# Patient Record
Sex: FEMALE | Race: WHITE | NOT HISPANIC OR LATINO | Employment: OTHER | URBAN - METROPOLITAN AREA
[De-identification: names, ages, dates, MRNs, and addresses within clinical notes are randomized per-mention and may not be internally consistent; named-entity substitution may affect disease eponyms.]

---

## 2017-02-16 ENCOUNTER — TRANSCRIBE ORDERS (OUTPATIENT)
Dept: ADMINISTRATIVE | Facility: HOSPITAL | Age: 77
End: 2017-02-16

## 2017-02-16 DIAGNOSIS — Z87.891 PERSONAL HISTORY OF TOBACCO USE, PRESENTING HAZARDS TO HEALTH: Primary | ICD-10-CM

## 2018-04-17 ENCOUNTER — TRANSCRIBE ORDERS (OUTPATIENT)
Dept: ADMINISTRATIVE | Facility: HOSPITAL | Age: 78
End: 2018-04-17

## 2018-04-17 DIAGNOSIS — Z72.0 TOBACCO ABUSE: Primary | ICD-10-CM

## 2018-04-17 DIAGNOSIS — Z87.891 PERSONAL HISTORY OF TOBACCO USE, PRESENTING HAZARDS TO HEALTH: ICD-10-CM

## 2018-04-17 DIAGNOSIS — Z72.0 TOBACCO ABUSE DISORDER: ICD-10-CM

## 2019-09-10 ENCOUNTER — TRANSCRIBE ORDERS (OUTPATIENT)
Dept: ADMINISTRATIVE | Facility: HOSPITAL | Age: 79
End: 2019-09-10

## 2019-09-10 DIAGNOSIS — Z12.31 VISIT FOR SCREENING MAMMOGRAM: Primary | ICD-10-CM

## 2019-09-10 DIAGNOSIS — M81.0 AGE-RELATED OSTEOPOROSIS WITHOUT CURRENT PATHOLOGICAL FRACTURE: ICD-10-CM

## 2020-07-13 ENCOUNTER — APPOINTMENT (OUTPATIENT)
Dept: CT IMAGING | Facility: HOSPITAL | Age: 80
End: 2020-07-13

## 2020-07-13 ENCOUNTER — APPOINTMENT (OUTPATIENT)
Dept: GENERAL RADIOLOGY | Facility: HOSPITAL | Age: 80
End: 2020-07-13

## 2020-07-13 ENCOUNTER — HOSPITAL ENCOUNTER (INPATIENT)
Facility: HOSPITAL | Age: 80
LOS: 4 days | Discharge: HOME | End: 2020-07-17
Attending: EMERGENCY MEDICINE | Admitting: INTERNAL MEDICINE
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
## 2020-07-13 DIAGNOSIS — N17.9 AKI (ACUTE KIDNEY INJURY) (HCC): ICD-10-CM

## 2020-07-13 DIAGNOSIS — Z78.9 IMPAIRED MOBILITY AND ADLS: ICD-10-CM

## 2020-07-13 DIAGNOSIS — M62.82 NON-TRAUMATIC RHABDOMYOLYSIS: Primary | ICD-10-CM

## 2020-07-13 DIAGNOSIS — M62.830 BACK SPASM: ICD-10-CM

## 2020-07-13 DIAGNOSIS — Z74.09 IMPAIRED MOBILITY AND ADLS: ICD-10-CM

## 2020-07-13 DIAGNOSIS — J43.9 PULMONARY EMPHYSEMA, UNSPECIFIED EMPHYSEMA TYPE (HCC): ICD-10-CM

## 2020-07-13 DIAGNOSIS — R05.9 COUGH: ICD-10-CM

## 2020-07-13 DIAGNOSIS — M54.9 ACUTE BACK PAIN, UNSPECIFIED BACK LOCATION, UNSPECIFIED BACK PAIN LATERALITY: ICD-10-CM

## 2020-07-13 DIAGNOSIS — S20.229A CONTUSION OF BACK, UNSPECIFIED LATERALITY, INITIAL ENCOUNTER: ICD-10-CM

## 2020-07-13 DIAGNOSIS — S09.90XA INJURY OF HEAD, INITIAL ENCOUNTER: ICD-10-CM

## 2020-07-13 PROBLEM — I10 ESSENTIAL HYPERTENSION: Status: ACTIVE | Noted: 2020-07-13

## 2020-07-13 LAB
ALBUMIN SERPL-MCNC: 3.9 G/DL (ref 3.5–5.2)
ALBUMIN/GLOB SERPL: 1.1 G/DL
ALP SERPL-CCNC: 50 U/L (ref 39–117)
ALT SERPL W P-5'-P-CCNC: 42 U/L (ref 1–33)
ANION GAP SERPL CALCULATED.3IONS-SCNC: 18 MMOL/L (ref 5–15)
AST SERPL-CCNC: 71 U/L (ref 1–32)
BACTERIA UR QL AUTO: ABNORMAL /HPF
BASOPHILS # BLD AUTO: 0.04 10*3/MM3 (ref 0–0.2)
BASOPHILS NFR BLD AUTO: 0.4 % (ref 0–1.5)
BILIRUB SERPL-MCNC: 0.7 MG/DL (ref 0–1.2)
BILIRUB UR QL STRIP: NEGATIVE
BUN SERPL-MCNC: 55 MG/DL (ref 8–23)
BUN/CREAT SERPL: 36.4 (ref 7–25)
CALCIUM SPEC-SCNC: 9.8 MG/DL (ref 8.6–10.5)
CHLORIDE SERPL-SCNC: 88 MMOL/L (ref 98–107)
CK SERPL-CCNC: 1320 U/L (ref 20–180)
CLARITY UR: CLEAR
CO2 SERPL-SCNC: 23 MMOL/L (ref 22–29)
COLOR UR: YELLOW
CREAT SERPL-MCNC: 1.51 MG/DL (ref 0.57–1)
DEPRECATED RDW RBC AUTO: 46.8 FL (ref 37–54)
EOSINOPHIL # BLD AUTO: 0.03 10*3/MM3 (ref 0–0.4)
EOSINOPHIL NFR BLD AUTO: 0.3 % (ref 0.3–6.2)
ERYTHROCYTE [DISTWIDTH] IN BLOOD BY AUTOMATED COUNT: 13.7 % (ref 12.3–15.4)
GFR SERPL CREATININE-BSD FRML MDRD: 33 ML/MIN/1.73
GLOBULIN UR ELPH-MCNC: 3.5 GM/DL
GLUCOSE SERPL-MCNC: 126 MG/DL (ref 65–99)
GLUCOSE UR STRIP-MCNC: NEGATIVE MG/DL
HCT VFR BLD AUTO: 46.8 % (ref 34–46.6)
HGB BLD-MCNC: 16.1 G/DL (ref 12–15.9)
HGB UR QL STRIP.AUTO: ABNORMAL
HOLD SPECIMEN: NORMAL
HOLD SPECIMEN: NORMAL
HYALINE CASTS UR QL AUTO: ABNORMAL /LPF
IMM GRANULOCYTES # BLD AUTO: 0.05 10*3/MM3 (ref 0–0.05)
IMM GRANULOCYTES NFR BLD AUTO: 0.5 % (ref 0–0.5)
KETONES UR QL STRIP: ABNORMAL
LEUKOCYTE ESTERASE UR QL STRIP.AUTO: NEGATIVE
LYMPHOCYTES # BLD AUTO: 1.4 10*3/MM3 (ref 0.7–3.1)
LYMPHOCYTES NFR BLD AUTO: 13.4 % (ref 19.6–45.3)
MAGNESIUM SERPL-MCNC: 2.2 MG/DL (ref 1.6–2.4)
MCH RBC QN AUTO: 32.3 PG (ref 26.6–33)
MCHC RBC AUTO-ENTMCNC: 34.4 G/DL (ref 31.5–35.7)
MCV RBC AUTO: 93.8 FL (ref 79–97)
MONOCYTES # BLD AUTO: 1.06 10*3/MM3 (ref 0.1–0.9)
MONOCYTES NFR BLD AUTO: 10.2 % (ref 5–12)
NEUTROPHILS NFR BLD AUTO: 7.83 10*3/MM3 (ref 1.7–7)
NEUTROPHILS NFR BLD AUTO: 75.2 % (ref 42.7–76)
NITRITE UR QL STRIP: NEGATIVE
NRBC BLD AUTO-RTO: 0 /100 WBC (ref 0–0.2)
NT-PROBNP SERPL-MCNC: 1009 PG/ML (ref 0–1800)
PH UR STRIP.AUTO: <=5 [PH] (ref 5–8)
PLATELET # BLD AUTO: 356 10*3/MM3 (ref 140–450)
PMV BLD AUTO: 9.2 FL (ref 6–12)
POTASSIUM SERPL-SCNC: 3.8 MMOL/L (ref 3.5–5.2)
PROT SERPL-MCNC: 7.4 G/DL (ref 6–8.5)
PROT UR QL STRIP: ABNORMAL
RBC # BLD AUTO: 4.99 10*6/MM3 (ref 3.77–5.28)
RBC # UR: ABNORMAL /HPF
REF LAB TEST METHOD: ABNORMAL
SARS-COV-2 RNA RESP QL NAA+PROBE: NOT DETECTED
SODIUM SERPL-SCNC: 129 MMOL/L (ref 136–145)
SP GR UR STRIP: 1.02 (ref 1–1.03)
SQUAMOUS #/AREA URNS HPF: ABNORMAL /HPF
TROPONIN T SERPL-MCNC: <0.01 NG/ML (ref 0–0.03)
UROBILINOGEN UR QL STRIP: ABNORMAL
WBC # BLD AUTO: 10.41 10*3/MM3 (ref 3.4–10.8)
WBC UR QL AUTO: ABNORMAL /HPF
WHOLE BLOOD HOLD SPECIMEN: NORMAL
WHOLE BLOOD HOLD SPECIMEN: NORMAL

## 2020-07-13 PROCEDURE — 80053 COMPREHEN METABOLIC PANEL: CPT | Performed by: EMERGENCY MEDICINE

## 2020-07-13 PROCEDURE — 83735 ASSAY OF MAGNESIUM: CPT | Performed by: EMERGENCY MEDICINE

## 2020-07-13 PROCEDURE — 93005 ELECTROCARDIOGRAM TRACING: CPT

## 2020-07-13 PROCEDURE — 84484 ASSAY OF TROPONIN QUANT: CPT | Performed by: EMERGENCY MEDICINE

## 2020-07-13 PROCEDURE — 85025 COMPLETE CBC W/AUTO DIFF WBC: CPT | Performed by: EMERGENCY MEDICINE

## 2020-07-13 PROCEDURE — 83880 ASSAY OF NATRIURETIC PEPTIDE: CPT | Performed by: EMERGENCY MEDICINE

## 2020-07-13 PROCEDURE — 73610 X-RAY EXAM OF ANKLE: CPT

## 2020-07-13 PROCEDURE — 74176 CT ABD & PELVIS W/O CONTRAST: CPT

## 2020-07-13 PROCEDURE — 87635 SARS-COV-2 COVID-19 AMP PRB: CPT | Performed by: PHYSICIAN ASSISTANT

## 2020-07-13 PROCEDURE — 81001 URINALYSIS AUTO W/SCOPE: CPT | Performed by: EMERGENCY MEDICINE

## 2020-07-13 PROCEDURE — P9612 CATHETERIZE FOR URINE SPEC: HCPCS

## 2020-07-13 PROCEDURE — 71250 CT THORAX DX C-: CPT

## 2020-07-13 PROCEDURE — 73560 X-RAY EXAM OF KNEE 1 OR 2: CPT

## 2020-07-13 PROCEDURE — 82550 ASSAY OF CK (CPK): CPT | Performed by: NURSE PRACTITIONER

## 2020-07-13 PROCEDURE — 70450 CT HEAD/BRAIN W/O DYE: CPT

## 2020-07-13 PROCEDURE — 93005 ELECTROCARDIOGRAM TRACING: CPT | Performed by: EMERGENCY MEDICINE

## 2020-07-13 PROCEDURE — 71045 X-RAY EXAM CHEST 1 VIEW: CPT

## 2020-07-13 PROCEDURE — 99223 1ST HOSP IP/OBS HIGH 75: CPT | Performed by: HOSPITALIST

## 2020-07-13 PROCEDURE — 99285 EMERGENCY DEPT VISIT HI MDM: CPT

## 2020-07-13 RX ORDER — SODIUM CHLORIDE 0.9 % (FLUSH) 0.9 %
10 SYRINGE (ML) INJECTION AS NEEDED
Status: DISCONTINUED | OUTPATIENT
Start: 2020-07-13 | End: 2020-07-17 | Stop reason: HOSPADM

## 2020-07-13 RX ORDER — SODIUM CHLORIDE 0.9 % (FLUSH) 0.9 %
10 SYRINGE (ML) INJECTION EVERY 12 HOURS SCHEDULED
Status: DISCONTINUED | OUTPATIENT
Start: 2020-07-13 | End: 2020-07-17 | Stop reason: HOSPADM

## 2020-07-13 RX ORDER — SODIUM CHLORIDE, SODIUM LACTATE, POTASSIUM CHLORIDE, CALCIUM CHLORIDE 600; 310; 30; 20 MG/100ML; MG/100ML; MG/100ML; MG/100ML
100 INJECTION, SOLUTION INTRAVENOUS CONTINUOUS
Status: DISCONTINUED | OUTPATIENT
Start: 2020-07-13 | End: 2020-07-17 | Stop reason: HOSPADM

## 2020-07-13 RX ORDER — DOCUSATE SODIUM 100 MG/1
100 CAPSULE, LIQUID FILLED ORAL 2 TIMES DAILY PRN
Status: DISCONTINUED | OUTPATIENT
Start: 2020-07-13 | End: 2020-07-17 | Stop reason: HOSPADM

## 2020-07-13 RX ORDER — ACETAMINOPHEN 325 MG/1
650 TABLET ORAL EVERY 4 HOURS PRN
Status: DISCONTINUED | OUTPATIENT
Start: 2020-07-13 | End: 2020-07-17 | Stop reason: HOSPADM

## 2020-07-13 RX ADMIN — SODIUM CHLORIDE 1000 ML: 9 INJECTION, SOLUTION INTRAVENOUS at 15:14

## 2020-07-13 RX ADMIN — SODIUM CHLORIDE, POTASSIUM CHLORIDE, SODIUM LACTATE AND CALCIUM CHLORIDE 100 ML/HR: 600; 310; 30; 20 INJECTION, SOLUTION INTRAVENOUS at 22:53

## 2020-07-13 RX ADMIN — SODIUM CHLORIDE, PRESERVATIVE FREE 10 ML: 5 INJECTION INTRAVENOUS at 22:53

## 2020-07-13 RX ADMIN — SODIUM CHLORIDE, POTASSIUM CHLORIDE, SODIUM LACTATE AND CALCIUM CHLORIDE 100 ML/HR: 600; 310; 30; 20 INJECTION, SOLUTION INTRAVENOUS at 19:14

## 2020-07-13 RX ADMIN — ACETAMINOPHEN 650 MG: 325 TABLET, FILM COATED ORAL at 23:05

## 2020-07-13 NOTE — H&P
[facility] Medicine Services  HISTORY AND PHYSICAL    Patient Name: Sierra Saba  : 1940  MRN: [id number]  Primary Care Physician: Praveen Page MD  Date of admission: 2020      Subjective   Subjective     Chief Complaint:  Fall, cough    HPI:  Sierra Saba is a 79 y.o. female medical history of hypertension who presents to [facility] with complaints of cough.  The patient reports approximately 1 week ago she fell in her home.  She reported that she could not get up from the ground.  She states she had to crawl to get to her telephone and laid on the ground all night and urinated on herself.  She was able to finally call for help and at that time refused paramedics to bring her to the ER.  She reports she continued to have diffuse body pain and back pain prompting her presentation today.  She is generally feels weak.  She reports that she has not had any focal weakness or changes of speech.  She further denies any dysuria, hematuria, fever or abdominal pain.  She further denies any current shortness of breath.  She recently traveled out of state by private vehicle with her son on  and states she socially distance and wear a mask the entire time.  The patient denies any sick contacts.      On arrival to the ER the patient's heart rate was elevated at 120.  Her blood pressure was normal 108/57 and a oxygen saturation of 93 percent on room air.  Her initial CMP revealed a elevated creatinine of 1.51 with a sodium level of 129.  She had a mild elevated AST and ALT with a normal bilirubin.  Her anion gap is also elevated 18.  Her urinalysis was remarkable for small amount of blood, 3-6 RBCs.  Her troponin and magnesium level, troponin and BNP were normal.  Her CBC showed WBC 10.41, H/H 16.1/46.8, platelet 356.  CK level was elevated 1320.  She had xray of bilateral ankles and knees which did not reveal any acute trauma.  Chest x-ray was normal.  She  underwent CT scan of the chest showed advanced changes of central lobar emphysema.  Lung appears to be clear of active disease.  No pleural effusion.  No chest wall hematoma.  Bony structures appear grossly normal.  CT abdomen pelvis which revealed no evidence of acute intra-abdominal or intrapelvic pathology and a nonobstructing right nephrolithiasis incidentally noted.  CT head was done which revealed stable head CT no evidence of acute trauma or other intracranial disease.  She was admitted for further evaluation and observation.    Review of Systems   Constitutional: Positive for chills.   Respiratory: Positive for cough. Negative for choking, chest tightness and shortness of breath.    Cardiovascular: Negative for chest pain, palpitations and leg swelling.   Gastrointestinal: Negative for abdominal pain, constipation, diarrhea, nausea and vomiting.   Genitourinary: Negative for decreased urine volume, dysuria, flank pain, frequency, hematuria and menstrual problem.   Musculoskeletal: Positive for back pain and myalgias.   Skin: Negative for pallor and rash.   Neurological: Positive for weakness. Negative for dizziness, tremors, light-headedness, numbness and headaches.   All other systems reviewed and are negative.         Personal History     Past Medical History:   Diagnosis Date   • Hypertension        Past Surgical History:   Procedure Laterality Date   • BACK SURGERY     • CHOLECYSTECTOMY     • HYSTERECTOMY     • TOTAL HIP ARTHROPLASTY         Family History: family history is not on file. Otherwise pertinent FHx was reviewed and unremarkable.     Social History:  reports that she has quit smoking. She does not have any smokeless tobacco history on file. She reports that she drank alcohol. She reports that she does not use drugs.  Social History     Social History Narrative   • Not on file       Medications:  Available home medication information reviewed.  No medications prior to admission.        Allergies   Allergen Reactions   • Penicillins Rash       Objective   Objective     Vital Signs:   Temp:  [98.1 °F (36.7 °C)] 98.1 °F (36.7 °C)  Heart Rate:  [105-120] 106  Resp:  [16] 16  BP: (108-156)/(57-90) 137/90        Physical Exam   Constitutional: She is oriented to person, place, and time. She appears well-developed and well-nourished. No distress.   HENT:   Head: Normocephalic and atraumatic.   Right Ear: External ear normal.   Left Ear: External ear normal.   Eyes: Conjunctivae and EOM are normal.   Cardiovascular: Normal rate and regular rhythm.   Pulmonary/Chest: Effort normal. No stridor. She has no wheezes.   Abdominal: Soft. She exhibits no mass. There is no tenderness. There is no guarding.   Neurological: She is alert and oriented to person, place, and time.   Skin: Skin is warm. She is not diaphoretic. No erythema.   Psychiatric: She has a normal mood and affect. Her behavior is normal.   Nursing note and vitals reviewed.       Results Reviewed:  I have personally reviewed current lab and radiology data.    Results from last 7 days   Lab Units 07/13/20  1427   WBC 10*3/mm3 10.41   HEMOGLOBIN g/dL 16.1*   HEMATOCRIT % 46.8*   PLATELETS 10*3/mm3 356     Results from last 7 days   Lab Units 07/13/20  1427   SODIUM mmol/L 129*   POTASSIUM mmol/L 3.8   CHLORIDE mmol/L 88*   CO2 mmol/L 23.0   BUN mg/dL 55*   CREATININE mg/dL 1.51*   GLUCOSE mg/dL 126*   CALCIUM mg/dL 9.8   ALT (SGPT) U/L 42*   AST (SGOT) U/L 71*   TROPONIN T ng/mL <0.010   PROBNP pg/mL 1,009.0     Estimated Creatinine Clearance: 29.7 mL/min (A) (by C-G formula based on SCr of 1.51 mg/dL (H)).  Brief Urine Lab Results  (Last result in the past 365 days)      Color   Clarity   Blood   Leuk Est   Nitrite   Protein   CREAT   Urine HCG        07/13/20 1714 Yellow Clear Small (1+) Negative Negative Trace             Imaging Results (Last 24 Hours)     Procedure Component Value Units Date/Time    CT Chest Without Contrast [[id number]]  Collected:  07/13/20 1625     Updated:  07/13/20 1658    Narrative:       EXAMINATION: CT CHEST WO CONTRAST-, CT ABDOMEN PELVIS WO CONTRAST-      INDICATION: fall. cough. mid back pain.     TECHNIQUE: 5 mm unenhanced images through the chest abdomen pelvis     The radiation dose reduction device was turned on for each scan per the  ALARA (As Low as Reasonably Achievable) protocol.     COMPARISON: No recent comparison studies. Chest CT scan from 03/23/2012     FINDINGS: History indicates fall with back pain cough.     Chest CT scan without contrast: There are advanced changes of  centrilobular emphysema. Lungs appear clear of active disease. There is  no pleural effusion. No chest wall hematoma is seen. Bony structures  appear grossly intact. Mediastinal window images show no evidence of  mediastinal hematoma adenopathy or significant pericardial effusion.       Impression:       Advanced changes of centrilobular emphysema. No evidence of  active chest disease.                    Abdomen and pelvis CT scan without contrast, clips are seen in the  gallbladder fossa. No significant abnormalities are seen of the liver,  spleen, pancreas, adrenal glands, or left kidney. There are several  small right renal calculi. There is no evidence of obstructive uropathy.  No upper abdominal free air or ascites adenopathy or acute inflammatory  focus is appreciated.     Regarding the lower abdomen and pelvis, bowel loops are normal in  caliber and normal in appearance. No mass adenopathy ascites or acute  inflammatory change is seen. There is streak artifact from patient's  right hip prosthesis partially obscures the lower pelvis. There is mild  streak artifact from the patient's lumbar spine fusion scattered Bladder  appears normally distended and grossly normal.  Patient history indicates mid back pain. No evidence of acute thoracic  or lumbar compression deformity is seen.           IMPRESSION:   1. No evidence of acute  intra-abdominal or intrapelvic pathology is  seen.  2. Nonobstructing right nephrolithiasis incidentally noted.          CT Abdomen Pelvis Without Contrast [[id number]] Collected:  07/13/20 1625     Updated:  07/13/20 1658    Narrative:       EXAMINATION: CT CHEST WO CONTRAST-, CT ABDOMEN PELVIS WO CONTRAST-      INDICATION: fall. cough. mid back pain.     TECHNIQUE: 5 mm unenhanced images through the chest abdomen pelvis     The radiation dose reduction device was turned on for each scan per the  ALARA (As Low as Reasonably Achievable) protocol.     COMPARISON: No recent comparison studies. Chest CT scan from 03/23/2012     FINDINGS: History indicates fall with back pain cough.     Chest CT scan without contrast: There are advanced changes of  centrilobular emphysema. Lungs appear clear of active disease. There is  no pleural effusion. No chest wall hematoma is seen. Bony structures  appear grossly intact. Mediastinal window images show no evidence of  mediastinal hematoma adenopathy or significant pericardial effusion.       Impression:       Advanced changes of centrilobular emphysema. No evidence of  active chest disease.                    Abdomen and pelvis CT scan without contrast, clips are seen in the  gallbladder fossa. No significant abnormalities are seen of the liver,  spleen, pancreas, adrenal glands, or left kidney. There are several  small right renal calculi. There is no evidence of obstructive uropathy.  No upper abdominal free air or ascites adenopathy or acute inflammatory  focus is appreciated.     Regarding the lower abdomen and pelvis, bowel loops are normal in  caliber and normal in appearance. No mass adenopathy ascites or acute  inflammatory change is seen. There is streak artifact from patient's  right hip prosthesis partially obscures the lower pelvis. There is mild  streak artifact from the patient's lumbar spine fusion scattered Bladder  appears normally distended and grossly  normal.  Patient history indicates mid back pain. No evidence of acute thoracic  or lumbar compression deformity is seen.           IMPRESSION:   1. No evidence of acute intra-abdominal or intrapelvic pathology is  seen.  2. Nonobstructing right nephrolithiasis incidentally noted.          CT Head Without Contrast [[id number]] Collected:  07/13/20 1615     Updated:  07/13/20 1645    Narrative:       EXAMINATION: CT HEAD WO CONTRAST- 07/13/2020     INDICATION: fall     TECHNIQUE: 5 mm unenhanced images through the brain     The radiation dose reduction device was turned on for each scan per the  ALARA (As Low as Reasonably Achievable) protocol.     COMPARISON: 07/30/2014 head CT scan     FINDINGS: The calvarium appears intact. Included paranasal sinuses and  mastoids appear clear. Soft tissue and images show expected degree of  generalized cerebral atrophy for age. Chronic appearing white matter  changes, particularly in the right corona radiata and centrum semiovale  appear stable. There is no evidence of hemorrhage contusion or edema  mass mass effect hydrocephalus, or infarct. Prominence of the bifrontal  CSF spaces is similar to prior study and consistent with expansion of  the CSF space due to generalized brain atrophy.       Impression:       Stable head CT scan with no evidence of acute trauma or  other acute intracranial disease.     D:  07/13/2020  E:  07/13/2020       XR Knee 1 or 2 View Bilateral [[id number]] Collected:  07/13/20 1601     Updated:  07/13/20 1627    Narrative:       EXAMINATION: XR KNEE 1 OR 2 VW BILATERAL- 07/13/2020     INDICATION: fall     COMPARISON: NONE     FINDINGS: Right knee: There is advanced lateral compartment DJD and  milder degenerative change elsewhere. No fracture, foreign body or plain  film evidence of knee joint effusion is seen.       Impression:       No evidence of acute right knee trauma. Advanced lateral  compartment DJD noted.     Left knee: Left knee joint appears  anatomically aligned and intact. No  fracture, avulsion or significant left knee joint effusion is seen.  There is moderate lateral compartment DJD.     IMPRESSION: Left knee DJD but no evidence of acute trauma.     D:  07/13/2020  E:  07/13/2020              XR Ankle 3+ View Bilateral [[id number]] Collected:  07/13/20 1559     Updated:  07/13/20 1624    Narrative:       EXAMINATION: XR ANKLE 3+ VW BILATERAL- 07/13/2020     INDICATION: fall     COMPARISON: NONE     FINDINGS: Patient history indicates fall today with bilateral ankle  pain.     Right ankle: Bones of the right ankle joint appear anatomically aligned  and intact. No fracture avulsion or foreign body is seen.       Impression:       No visible right ankle fracture.     Left ankle: Bones of the left ankle joint appear anatomically aligned  and intact. No fracture avulsion or foreign body is seen.     IMPRESSION: No visible left ankle fracture.     D:  07/13/2020  E:  07/13/2020           XR Chest 1 View [[id number]] Collected:  07/13/20 1410     Updated:  07/13/20 1445    Narrative:       EXAMINATION: XR CHEST 1 VW- 07/13/2020     INDICATION: shortness of breath      COMPARISON: [id number]     FINDINGS: Heart, mediastinum and pulmonary vasculature appear within  normal limits.  The lungs appear hyperexpanded and clear except for mild  chronic appearing interstitial changes. No edema effusion or  pneumothorax is seen.       Impression:       No evidence of active chest disease.     D:  07/13/2020  E:  07/13/2020                    Assessment/Plan   Assessment & Plan     Active Hospital Problems    Diagnosis POA   • Non-traumatic rhabdomyolysis [M62.82] Yes   • Cough [R05] Yes   • Fall [W19.XXXA] Yes       Sierra Saba is a 79 y.o. female medical history of hypertension who presents to [facility] with complaints of cough.  The patient reports approximately 1 week ago she fell in her home.  She reported that she could not get up from the ground.   She states she had to crawl to get to her telephone and laid on the ground all night and urinated on herself.  She was able to finally call for help and at that time refused paramedics to bring her to the ER.  She reports she continued to have diffuse body pain and back pain prompting her presentation today.  She is generally feels weak.  On arrival to the ER the patient's heart rate was elevated at 120.  Her initial CMP revealed a elevated creatinine of 1.51 with a sodium level of 129.  She had a mild elevated AST and ALT with a normal bilirubin.  Her anion gap is also elevated 18.  Her urinalysis was remarkable for small amount of blood, 3-6 RBCs.  Her troponin and magnesium level, troponin and BNP were normal.  Her CBC showed WBC 10.41, H/H 16.1/46.8, platelet 356.  CK level was elevated 1320.  She had xray of bilateral ankles and knees which did not reveal any acute trauma.  CT chest abdomen and pelvis negative for acute issue.  She will be admitted for management.      Rhabdomyolysis  -found down by paramedics, initially refused ED eval but body pain got worse prompting todays visit  -CK 1320  -LR IV infusion    Hyponatremia  -Sodium low at 129  -hypovolemia hyponatremia I assume (pt appears dry)  -recheck in AM    Cough and travel out of state (with son on 7/4/2020)  -Will COVID 19 test  -CT chest clear of active disease    HTN  -patient unsure of medications, said only Dr. Page, her PCP would know      Medication list needs to be obtained tomorrow during normal hours.          DVT prophylaxis:  SCD      Admission Communication  Due to current limited visitation policies, an attempt will be made to update patient's identified best point-of-contact(s) at admission to discuss plan of care.   Contact: NO CONTACT ON FILE, she asked for me to call son                  CODE STATUS:    Code Status and Medical Interventions:   Ordered at: 07/13/20 9766     Level Of Support Discussed With:    Patient     Code Status:     CPR     Medical Interventions (Level of Support Prior to Arrest):    Full       Admission Status:  I believe this patient meets INPATIENT status due to JORDANA, rhabdo, possible COVID-19.  I feel patient’s risk for adverse outcomes and need for care warrant INPATIENT evaluation and I predict the patient’s care encounter to likely last beyond 2 midnights.      Electronically signed by Ivory Ferrera PA-C, 07/13/20, 5:33 PM.      Attending   Admission Attestation       I have seen and examined the patient, performing an independent face-to-face diagnostic evaluation with plan of care reviewed and developed with the advanced practice clinician (APC).      Brief Summary Statement:   Sierra Saba is a 79 y.o. female with hx of HTN who presents to the ER due to a fall a few days ago. She apparently laid on the floor for about 12 hours but then was able to crawl to the phone to call EMS. EMS helped her get off the floor but she refused to go to the ER for evaluation. She now comes in with worsening generalized weakness and diffuse body aches. Workup in the ER was negative as far as her scans, however labs showed JORDANA and rhabdomyolysis. Also reported recent travel out of state over 4th of July weekend and a new cough.    Remainder of detailed HPI is as noted by APC and has been reviewed and/or edited by me for completeness.    Attending Physical Exam:  Constitutional: Awake, alert  Eyes: PERRLA, sclerae anicteric, no conjunctival injection  HENT: NCAT, mucous membranes moist  Neck: Supple, no thyromegaly, no lymphadenopathy, trachea midline  Respiratory: Clear to auscultation bilaterally, nonlabored respirations   Cardiovascular: RRR, no murmurs, rubs, or gallops, palpable pedal pulses bilaterally  Gastrointestinal: Positive bowel sounds, soft, nontender, nondistended  Musculoskeletal: No bilateral ankle edema, no clubbing or cyanosis to extremities  Psychiatric: Appropriate affect, cooperative  Neurologic: Oriented x 3,  strength symmetric in all extremities, Cranial Nerves grossly intact to confrontation, speech clear  Skin: No rashes      Brief Assessment/Plan :  See detailed assessment and plan developed with APC which I have reviewed and/or edited for completeness.    PLAN:  --Patient initially admitted to floor but upon further detailed history, we learned she had recently traveled out of state and has a new cough as well. Will transfer to  for COVID-19 rule out. CT chest not concerning.  --IV fluids and repeat labs including CK in AM.  --PT/OT evaluation.  --Pain control.    Electronically signed by Maya Kruger MD, 07/13/20, 19:33

## 2020-07-13 NOTE — ED PROVIDER NOTES
Subjective   Sierra Saba is a 79 y.o. female who presents to the ED with c/o fall. The patient fell approximately 2-3 days ago and was on the ground for around 12 hours. She was able to gather enough strength to reach her phone and contact EMS, who arrived at the scene and helped her onto her couch in a seated position. The patient declined the paramedics' offer to transport her to the ED for further evaluation and they left. She states she did not feel improved over the next couple of days, prompting her to contact EMS again to transport her to the ED. The patient has had pain to her mid and lower back, bilateral knees, and bilateral knees. She currently lives alone and does not have any nearby family, as well as her cleaning worker being unable to come to her home since COVID-19 restrictions began. The patient complains of cough, shortness of breath, weakness, headache, diarrhea, and mild abdominal pain but denies fever. She has a past medical history of hypertension with a surgical history including hysterectomy, cholecystectomy, and total hip arthroplasty. There are no other acute complaints at this time.      History provided by:  Patient and EMS personnel  Fall   Mechanism of injury: fall    Injury location:  Torso and leg  Torso injury location:  Back  Leg injury location:  L knee, R knee, L ankle and R ankle  Incident location:  Home  Time since incident:  3 days  Arrived directly from scene: no    Fall:     Fall occurred:  Unable to specify    Impact surface:  Hard floor and carpet    Point of impact:  Knees and back  Suspicion of alcohol use: no    Suspicion of drug use: no    Tetanus status:  Unknown  Prior to arrival data:     Patient ambulatory at scene: no      Blood loss:  None    Responsiveness at scene:  Alert    Orientation at scene:  Person, place, situation and time    Loss of consciousness: no      Amnesic to event: no    Associated symptoms: abdominal pain, back pain, difficulty breathing and  headaches    Associated symptoms: no chest pain, no loss of consciousness, no nausea, no neck pain and no vomiting    Risk factors: no CABG, no CAD, no CHF, no COPD, no diabetes, no past MI and not pregnant        Review of Systems   Constitutional: Negative for fever.   Respiratory: Positive for cough and shortness of breath.    Cardiovascular: Negative for chest pain.   Gastrointestinal: Positive for abdominal pain and diarrhea. Negative for nausea and vomiting.   Musculoskeletal: Positive for back pain. Negative for neck pain.        The patient complains of mid and lower back pain, bilateral knee pain, and bilateral ankle pain.   Neurological: Positive for weakness and headaches. Negative for loss of consciousness.   All other systems reviewed and are negative.      Past Medical History:   Diagnosis Date   • Hypertension        Allergies   Allergen Reactions   • Penicillins Rash       Past Surgical History:   Procedure Laterality Date   • BACK SURGERY     • CHOLECYSTECTOMY     • HYSTERECTOMY     • TOTAL HIP ARTHROPLASTY         History reviewed. No pertinent family history.    Social History     Socioeconomic History   • Marital status:      Spouse name: Not on file   • Number of children: Not on file   • Years of education: Not on file   • Highest education level: Not on file   Tobacco Use   • Smoking status: Former Smoker   Substance and Sexual Activity   • Alcohol use: Not Currently   • Drug use: Never         Objective   Physical Exam   Constitutional: She is oriented to person, place, and time. She appears well-developed and well-nourished. No distress.   The patient is a frail, weak elderly lady.   HENT:   Head: Normocephalic and atraumatic.   Eyes: Conjunctivae are normal. No scleral icterus.   Neck: Normal range of motion. Neck supple.   Cardiovascular: Normal rate, regular rhythm and normal heart sounds.   No murmur heard.  Good pulses.   Pulmonary/Chest: Effort normal and breath sounds normal.  No respiratory distress.   Abdominal: Soft. There is no tenderness.   Musculoskeletal: Normal range of motion. She exhibits tenderness. She exhibits no edema.        Cervical back: She exhibits no tenderness and no bony tenderness.        Thoracic back: She exhibits tenderness.        Lumbar back: She exhibits tenderness.   No tenderness to palpation to the cervical spine. Some paravertebral tenderness to palpation to the thoracic and lumbar spinal processes.  Bilateral knee pain with painful range of motion. Bilateral ankle pain with painful range of motion.  Bilateral hip pain.   Neurological: She is alert and oriented to person, place, and time.   Mentation is normal.   Skin: Skin is warm and dry.   Psychiatric: She has a normal mood and affect. Her behavior is normal.   Nursing note and vitals reviewed.      Procedures         ED Course  ED Course as of Jul 13 1707   Mon Jul 13, 2020   1445 Broad differential will need to get her kidney functions back will end up getting a CT of her chest as well as her abdomen pelvis and this should include her pelvic bones as well as her T and L-spine.  I will also get a CAT scan of her head.  She does have diffuse tenderness to her legs but nothing really focal except for bilateral knees and ankles from where she reports she was crawling no deformity.    [JM]   1600 Awaiting CTs    [JM]   165 Hospitalist paged    [JM]   3507 I spoke with Dr. Kruger who will admit.    [JM]      ED Course User Index  [JM] Xavier Carter APRN     Recent Results (from the past 24 hour(s))   Comprehensive Metabolic Panel    Collection Time: 07/13/20  2:27 PM   Result Value Ref Range    Glucose 126 (H) 65 - 99 mg/dL    BUN 55 (H) 8 - 23 mg/dL    Creatinine 1.51 (H) 0.57 - 1.00 mg/dL    Sodium 129 (L) 136 - 145 mmol/L    Potassium 3.8 3.5 - 5.2 mmol/L    Chloride 88 (L) 98 - 107 mmol/L    CO2 23.0 22.0 - 29.0 mmol/L    Calcium 9.8 8.6 - 10.5 mg/dL    Total Protein 7.4 6.0 - 8.5 g/dL    Albumin 3.90  3.50 - 5.20 g/dL    ALT (SGPT) 42 (H) 1 - 33 U/L    AST (SGOT) 71 (H) 1 - 32 U/L    Alkaline Phosphatase 50 39 - 117 U/L    Total Bilirubin 0.7 0.0 - 1.2 mg/dL    eGFR Non African Amer 33 (L) >60 mL/min/1.73    Globulin 3.5 gm/dL    A/G Ratio 1.1 g/dL    BUN/Creatinine Ratio 36.4 (H) 7.0 - 25.0    Anion Gap 18.0 (H) 5.0 - 15.0 mmol/L   Troponin    Collection Time: 07/13/20  2:27 PM   Result Value Ref Range    Troponin T <0.010 0.000 - 0.030 ng/mL   Magnesium    Collection Time: 07/13/20  2:27 PM   Result Value Ref Range    Magnesium 2.2 1.6 - 2.4 mg/dL   BNP    Collection Time: 07/13/20  2:27 PM   Result Value Ref Range    proBNP 1,009.0 0.0-1,800.0 pg/mL   Light Blue Top    Collection Time: 07/13/20  2:27 PM   Result Value Ref Range    Extra Tube hold for add-on    Green Top (Gel)    Collection Time: 07/13/20  2:27 PM   Result Value Ref Range    Extra Tube Hold for add-ons.    Lavender Top    Collection Time: 07/13/20  2:27 PM   Result Value Ref Range    Extra Tube hold for add-on    Gold Top - SST    Collection Time: 07/13/20  2:27 PM   Result Value Ref Range    Extra Tube Hold for add-ons.    CBC Auto Differential    Collection Time: 07/13/20  2:27 PM   Result Value Ref Range    WBC 10.41 3.40 - 10.80 10*3/mm3    RBC 4.99 3.77 - 5.28 10*6/mm3    Hemoglobin 16.1 (H) 12.0 - 15.9 g/dL    Hematocrit 46.8 (H) 34.0 - 46.6 %    MCV 93.8 79.0 - 97.0 fL    MCH 32.3 26.6 - 33.0 pg    MCHC 34.4 31.5 - 35.7 g/dL    RDW 13.7 12.3 - 15.4 %    RDW-SD 46.8 37.0 - 54.0 fl    MPV 9.2 6.0 - 12.0 fL    Platelets 356 140 - 450 10*3/mm3    Neutrophil % 75.2 42.7 - 76.0 %    Lymphocyte % 13.4 (L) 19.6 - 45.3 %    Monocyte % 10.2 5.0 - 12.0 %    Eosinophil % 0.3 0.3 - 6.2 %    Basophil % 0.4 0.0 - 1.5 %    Immature Grans % 0.5 0.0 - 0.5 %    Neutrophils, Absolute 7.83 (H) 1.70 - 7.00 10*3/mm3    Lymphocytes, Absolute 1.40 0.70 - 3.10 10*3/mm3    Monocytes, Absolute 1.06 (H) 0.10 - 0.90 10*3/mm3    Eosinophils, Absolute 0.03 0.00 - 0.40  10*3/mm3    Basophils, Absolute 0.04 0.00 - 0.20 10*3/mm3    Immature Grans, Absolute 0.05 0.00 - 0.05 10*3/mm3    nRBC 0.0 0.0 - 0.2 /100 WBC   CK    Collection Time: 07/13/20  2:27 PM   Result Value Ref Range    Creatine Kinase 1,320 (H) 20 - 180 U/L     Note: In addition to lab results from this visit, the labs listed above may include labs taken at another facility or during a different encounter within the last 24 hours. Please correlate lab times with ED admission and discharge times for further clarification of the services performed during this visit.    CT Head Without Contrast   Preliminary Result   Stable head CT scan with no evidence of acute trauma or   other acute intracranial disease.       D:  07/13/2020   E:  07/13/2020          CT Chest Without Contrast   Preliminary Result   Advanced changes of centrilobular emphysema. No evidence of   active chest disease.                           Abdomen and pelvis CT scan without contrast, clips are seen in the   gallbladder fossa. No significant abnormalities are seen of the liver,   spleen, pancreas, adrenal glands, or left kidney. There are several   small right renal calculi. There is no evidence of obstructive uropathy.   No upper abdominal free air or ascites adenopathy or acute inflammatory   focus is appreciated.       Regarding the lower abdomen and pelvis, bowel loops are normal in   caliber and normal in appearance. No mass adenopathy ascites or acute   inflammatory change is seen. There is streak artifact from patient's   right hip prosthesis partially obscures the lower pelvis. There is mild   streak artifact from the patient's lumbar spine fusion scattered Bladder   appears normally distended and grossly normal.   Patient history indicates mid back pain. No evidence of acute thoracic   or lumbar compression deformity is seen.               IMPRESSION:    1. No evidence of acute intra-abdominal or intrapelvic pathology is   seen.   2.  Nonobstructing right nephrolithiasis incidentally noted.              CT Abdomen Pelvis Without Contrast   Preliminary Result   Advanced changes of centrilobular emphysema. No evidence of   active chest disease.                           Abdomen and pelvis CT scan without contrast, clips are seen in the   gallbladder fossa. No significant abnormalities are seen of the liver,   spleen, pancreas, adrenal glands, or left kidney. There are several   small right renal calculi. There is no evidence of obstructive uropathy.   No upper abdominal free air or ascites adenopathy or acute inflammatory   focus is appreciated.       Regarding the lower abdomen and pelvis, bowel loops are normal in   caliber and normal in appearance. No mass adenopathy ascites or acute   inflammatory change is seen. There is streak artifact from patient's   right hip prosthesis partially obscures the lower pelvis. There is mild   streak artifact from the patient's lumbar spine fusion scattered Bladder   appears normally distended and grossly normal.   Patient history indicates mid back pain. No evidence of acute thoracic   or lumbar compression deformity is seen.               IMPRESSION:    1. No evidence of acute intra-abdominal or intrapelvic pathology is   seen.   2. Nonobstructing right nephrolithiasis incidentally noted.              XR Ankle 3+ View Bilateral   Preliminary Result   No visible right ankle fracture.       Left ankle: Bones of the left ankle joint appear anatomically aligned   and intact. No fracture avulsion or foreign body is seen.       IMPRESSION: No visible left ankle fracture.       D:  07/13/2020   E:  07/13/2020               XR Knee 1 or 2 View Bilateral   Preliminary Result   No evidence of acute right knee trauma. Advanced lateral   compartment DJD noted.       Left knee: Left knee joint appears anatomically aligned and intact. No   fracture, avulsion or significant left knee joint effusion is seen.   There is  moderate lateral compartment DJD.       IMPRESSION: Left knee DJD but no evidence of acute trauma.       D:  07/13/2020   E:  07/13/2020                   XR Chest 1 View   Preliminary Result   No evidence of active chest disease.       D:  07/13/2020   E:  07/13/2020                 Vitals:    07/13/20 1405 07/13/20 1407 07/13/20 1500 07/13/20 1540   BP: 127/84  156/85 156/83   BP Location:       Patient Position:       Pulse:  115 113 114   Resp:       Temp:       TempSrc:       SpO2:       Weight:       Height:         Medications   sodium chloride 0.9 % flush 10 mL (has no administration in time range)   sodium chloride 0.9 % bolus 1,000 mL (1,000 mL Intravenous New Bag 7/13/20 1514)     ECG/EMG Results (last 24 hours)     Procedure Component Value Units Date/Time    ECG 12 Lead [[id number]] Collected:  07/13/20 1304     Updated:  07/13/20 1356    Narrative:       Test Reason : Weak/Dizzy/AMS protocol  Blood Pressure : **/** mmHG  Vent. Rate : 124 BPM     Atrial Rate : 124 BPM     P-R Int : 128 ms          QRS Dur : 076 ms      QT Int : 326 ms       P-R-T Axes : 082 078 062 degrees     QTc Int : 468 ms    Sinus tachycardia  Right atrial enlargement  Borderline ECG  When compared with ECG of 29-MAR-2016 21:27,  Nonspecific T wave abnormality, improved in Inferior leads  T wave inversion no longer evident in Lateral leads  Confirmed by NANCY DELATORRE MD ([id number]) on 7/13/2020 1:56:22 PM    Referred By:  EDMD           Confirmed By:NANCY DELATORRE MD        ECG 12 Lead   Final Result   Test Reason : Weak/Dizzy/AMS protocol   Blood Pressure : **/** mmHG   Vent. Rate : 124 BPM     Atrial Rate : 124 BPM      P-R Int : 128 ms          QRS Dur : 076 ms       QT Int : 326 ms       P-R-T Axes : 082 078 062 degrees      QTc Int : 468 ms      Sinus tachycardia   Right atrial enlargement   Borderline ECG   When compared with ECG of 29-MAR-2016 21:27,   Nonspecific T wave abnormality, improved in Inferior leads   T wave inversion  no longer evident in Lateral leads   Confirmed by NANCY DELATORRE MD ([id number]) on 7/13/2020 1:56:22 PM      Referred By:  EDMD           Confirmed By:NANCY DELATORRE MD                                                   [facility]    Final diagnoses:   Non-traumatic rhabdomyolysis   JORDANA (acute kidney injury) (CMS/[facility])   Injury of head, initial encounter   Acute back pain, unspecified back location, unspecified back pain laterality   Back spasm   Contusion of back, unspecified laterality, initial encounter   Fall, initial encounter   Cough   Pulmonary emphysema, unspecified emphysema type (CMS/[facility])       Documentation assistance provided by haley Sharif.  Information recorded by the haley was done at my direction and has been verified and validated by me.     Logan Sharif  07/13/20 2156       Xavier Carter APRN  07/13/20 7408

## 2020-07-14 LAB
ALBUMIN SERPL-MCNC: 3.1 G/DL (ref 3.5–5.2)
ALBUMIN/GLOB SERPL: 1.1 G/DL
ALP SERPL-CCNC: 42 U/L (ref 39–117)
ALT SERPL W P-5'-P-CCNC: 29 U/L (ref 1–33)
ANION GAP SERPL CALCULATED.3IONS-SCNC: 10 MMOL/L (ref 5–15)
AST SERPL-CCNC: 37 U/L (ref 1–32)
BASOPHILS # BLD AUTO: 0.04 10*3/MM3 (ref 0–0.2)
BASOPHILS NFR BLD AUTO: 0.5 % (ref 0–1.5)
BILIRUB SERPL-MCNC: 0.8 MG/DL (ref 0–1.2)
BUN SERPL-MCNC: 31 MG/DL (ref 8–23)
BUN/CREAT SERPL: 41.9 (ref 7–25)
CALCIUM SPEC-SCNC: 8.7 MG/DL (ref 8.6–10.5)
CHLORIDE SERPL-SCNC: 95 MMOL/L (ref 98–107)
CK SERPL-CCNC: 628 U/L (ref 20–180)
CO2 SERPL-SCNC: 27 MMOL/L (ref 22–29)
CREAT SERPL-MCNC: 0.74 MG/DL (ref 0.57–1)
DEPRECATED RDW RBC AUTO: 47.5 FL (ref 37–54)
EOSINOPHIL # BLD AUTO: 0.16 10*3/MM3 (ref 0–0.4)
EOSINOPHIL NFR BLD AUTO: 1.9 % (ref 0.3–6.2)
ERYTHROCYTE [DISTWIDTH] IN BLOOD BY AUTOMATED COUNT: 13.8 % (ref 12.3–15.4)
GFR SERPL CREATININE-BSD FRML MDRD: 76 ML/MIN/1.73
GLOBULIN UR ELPH-MCNC: 2.8 GM/DL
GLUCOSE SERPL-MCNC: 98 MG/DL (ref 65–99)
HCT VFR BLD AUTO: 42 % (ref 34–46.6)
HGB BLD-MCNC: 14.4 G/DL (ref 12–15.9)
IMM GRANULOCYTES # BLD AUTO: 0.03 10*3/MM3 (ref 0–0.05)
IMM GRANULOCYTES NFR BLD AUTO: 0.3 % (ref 0–0.5)
LYMPHOCYTES # BLD AUTO: 1.95 10*3/MM3 (ref 0.7–3.1)
LYMPHOCYTES NFR BLD AUTO: 22.7 % (ref 19.6–45.3)
MCH RBC QN AUTO: 32.3 PG (ref 26.6–33)
MCHC RBC AUTO-ENTMCNC: 34.3 G/DL (ref 31.5–35.7)
MCV RBC AUTO: 94.2 FL (ref 79–97)
MONOCYTES # BLD AUTO: 0.79 10*3/MM3 (ref 0.1–0.9)
MONOCYTES NFR BLD AUTO: 9.2 % (ref 5–12)
NEUTROPHILS NFR BLD AUTO: 5.62 10*3/MM3 (ref 1.7–7)
NEUTROPHILS NFR BLD AUTO: 65.4 % (ref 42.7–76)
NRBC BLD AUTO-RTO: 0 /100 WBC (ref 0–0.2)
PLATELET # BLD AUTO: 246 10*3/MM3 (ref 140–450)
PMV BLD AUTO: 9.2 FL (ref 6–12)
POTASSIUM SERPL-SCNC: 3 MMOL/L (ref 3.5–5.2)
PROT SERPL-MCNC: 5.9 G/DL (ref 6–8.5)
RBC # BLD AUTO: 4.46 10*6/MM3 (ref 3.77–5.28)
SODIUM SERPL-SCNC: 132 MMOL/L (ref 136–145)
WBC # BLD AUTO: 8.59 10*3/MM3 (ref 3.4–10.8)

## 2020-07-14 PROCEDURE — 99232 SBSQ HOSP IP/OBS MODERATE 35: CPT | Performed by: FAMILY MEDICINE

## 2020-07-14 PROCEDURE — 85025 COMPLETE CBC W/AUTO DIFF WBC: CPT | Performed by: PHYSICIAN ASSISTANT

## 2020-07-14 PROCEDURE — 80053 COMPREHEN METABOLIC PANEL: CPT | Performed by: PHYSICIAN ASSISTANT

## 2020-07-14 PROCEDURE — 97161 PT EVAL LOW COMPLEX 20 MIN: CPT

## 2020-07-14 PROCEDURE — 97165 OT EVAL LOW COMPLEX 30 MIN: CPT

## 2020-07-14 PROCEDURE — 97530 THERAPEUTIC ACTIVITIES: CPT

## 2020-07-14 PROCEDURE — 82550 ASSAY OF CK (CPK): CPT | Performed by: PHYSICIAN ASSISTANT

## 2020-07-14 RX ORDER — ACETAMINOPHEN 325 MG/1
650 TABLET ORAL EVERY 4 HOURS PRN
COMMUNITY

## 2020-07-14 RX ORDER — LEVETIRACETAM 500 MG/1
500 TABLET ORAL EVERY 12 HOURS SCHEDULED
COMMUNITY
Start: 2022-01-01

## 2020-07-14 RX ORDER — POTASSIUM CHLORIDE 1.5 G/1.77G
40 POWDER, FOR SOLUTION ORAL AS NEEDED
Status: DISCONTINUED | OUTPATIENT
Start: 2020-07-14 | End: 2020-07-17 | Stop reason: HOSPADM

## 2020-07-14 RX ORDER — PAROXETINE HYDROCHLORIDE 20 MG/1
20 TABLET, FILM COATED ORAL DAILY
COMMUNITY
Start: 2005-01-04

## 2020-07-14 RX ORDER — POTASSIUM CHLORIDE 7.45 MG/ML
10 INJECTION INTRAVENOUS
Status: DISCONTINUED | OUTPATIENT
Start: 2020-07-14 | End: 2020-07-17 | Stop reason: HOSPADM

## 2020-07-14 RX ORDER — DIAZEPAM 5 MG/1
5 TABLET ORAL 3 TIMES DAILY PRN
Status: ON HOLD | COMMUNITY
End: 2021-01-01 | Stop reason: SDUPTHER

## 2020-07-14 RX ORDER — POTASSIUM CHLORIDE 750 MG/1
40 CAPSULE, EXTENDED RELEASE ORAL AS NEEDED
Status: DISCONTINUED | OUTPATIENT
Start: 2020-07-14 | End: 2020-07-17 | Stop reason: HOSPADM

## 2020-07-14 RX ORDER — LISINOPRIL 5 MG/1
5 TABLET ORAL NIGHTLY
COMMUNITY
End: 2020-07-17 | Stop reason: HOSPADM

## 2020-07-14 RX ADMIN — ACETAMINOPHEN 650 MG: 325 TABLET, FILM COATED ORAL at 22:59

## 2020-07-14 RX ADMIN — ACETAMINOPHEN 650 MG: 325 TABLET, FILM COATED ORAL at 19:21

## 2020-07-14 RX ADMIN — ACETAMINOPHEN 650 MG: 325 TABLET, FILM COATED ORAL at 10:34

## 2020-07-14 RX ADMIN — POTASSIUM CHLORIDE 40 MEQ: 10 CAPSULE, COATED, EXTENDED RELEASE ORAL at 12:48

## 2020-07-14 RX ADMIN — POTASSIUM CHLORIDE 40 MEQ: 10 CAPSULE, COATED, EXTENDED RELEASE ORAL at 20:00

## 2020-07-14 RX ADMIN — SODIUM CHLORIDE, PRESERVATIVE FREE 10 ML: 5 INJECTION INTRAVENOUS at 20:05

## 2020-07-14 RX ADMIN — ACETAMINOPHEN 650 MG: 325 TABLET, FILM COATED ORAL at 04:13

## 2020-07-14 RX ADMIN — POTASSIUM CHLORIDE 40 MEQ: 10 CAPSULE, COATED, EXTENDED RELEASE ORAL at 16:37

## 2020-07-14 RX ADMIN — SODIUM CHLORIDE, POTASSIUM CHLORIDE, SODIUM LACTATE AND CALCIUM CHLORIDE 100 ML/HR: 600; 310; 30; 20 INJECTION, SOLUTION INTRAVENOUS at 20:04

## 2020-07-14 RX ADMIN — ACETAMINOPHEN 650 MG: 325 TABLET, FILM COATED ORAL at 15:46

## 2020-07-14 RX ADMIN — SODIUM CHLORIDE, POTASSIUM CHLORIDE, SODIUM LACTATE AND CALCIUM CHLORIDE 100 ML/HR: 600; 310; 30; 20 INJECTION, SOLUTION INTRAVENOUS at 08:55

## 2020-07-14 NOTE — PLAN OF CARE
Problem: Patient Care Overview  Goal: Plan of Care Review  Outcome: Ongoing (interventions implemented as appropriate)  Flowsheets (Taken 7/14/2020 9367)  Plan of Care Reviewed With: patient  Outcome Summary: PT IE completed. Patient presents with deficits in functional mobility limited by generalized weakness and decreased functional endurance warranting further skilled PTx. Patient required min.ax2 for sit to stand transfer. Patient ambulated 25' with RW, min.ax2 with verbal cues needed for positioning of AD. Recommend d/c to inpatient rehab.

## 2020-07-14 NOTE — PROGRESS NOTES
Continued Stay Note  [facility]     Patient Name: Sierra Saba  MRN: [id number]  Today's Date: 7/14/2020    Admit Date: 7/13/2020    Discharge Plan     Row Name 07/14/20 1545       Plan    Plan Comments  Attmepted to call michelle Whaley for initial encounter and left voicemail with name, title and callbakc number.  CM following.      Final Discharge Disposition Code  30 - still a patient        Discharge Codes    No documentation.             Irene Purdy RN

## 2020-07-14 NOTE — PROGRESS NOTES
[facility] Medicine Services  PROGRESS NOTE    Patient Name: Sierra Saba  : 1940  MRN: [id number]    Date of Admission: 2020  Primary Care Physician: Praveen Page MD    Subjective   Subjective     CC:  F/u cough and fall     HPI:  Patient is sitting up in chair. She is oriented but appears to be confused. She has many tangential thoughts. She denies any pain or shortness of breath.     Review of Systems  Gen- No fevers, chills  CV- No chest pain, palpitations  Resp- + cough, dyspnea  GI- No N/V/D, abd pain        Objective   Objective     Vital Signs:   Temp:  [96.8 °F (36 °C)-98.1 °F (36.7 °C)] 97.1 °F (36.2 °C)  Heart Rate:  [] 93  Resp:  [16-20] 20  BP: (108-156)/(57-90) 133/70        Physical Exam:  Constitutional: No acute distress, awake, alert, elderly female   HENT: NCAT, mucous membranes moist  Respiratory: Clear to auscultation bilaterally, respiratory effort normal   Cardiovascular: RRR, no murmurs, rubs, or gallops  Gastrointestinal: Positive bowel sounds, soft, nontender, nondistended  Musculoskeletal: No bilateral ankle edema  Psychiatric: cooperative, slightly agitated   Neurologic: Oriented x 3, strength symmetric in all extremities, Cranial Nerves grossly intact to confrontation, speech clear  Skin: No rashes      Results Reviewed:  Results from last 7 days   Lab Units 20  0627 20  1427   WBC 10*3/mm3 8.59 10.41   HEMOGLOBIN g/dL 14.4 16.1*   HEMATOCRIT % 42.0 46.8*   PLATELETS 10*3/mm3 246 356     Results from last 7 days   Lab Units 20  0627 20  1427   SODIUM mmol/L 132* 129*   POTASSIUM mmol/L 3.0* 3.8   CHLORIDE mmol/L 95* 88*   CO2 mmol/L 27.0 23.0   BUN mg/dL 31* 55*   CREATININE mg/dL 0.74 1.51*   GLUCOSE mg/dL 98 126*   CALCIUM mg/dL 8.7 9.8   ALT (SGPT) U/L 29 42*   AST (SGOT) U/L 37* 71*   TROPONIN T ng/mL  --  <0.010   PROBNP pg/mL  --  1,009.0     Estimated Creatinine Clearance: 56 mL/min (by C-G formula based  on SCr of 0.74 mg/dL).    Microbiology Results Abnormal     Procedure Component Value - Date/Time    COVID-19,[facility]-HOU[facility], NP SWAB IN TRANSPORT MEDIA 8-12 HR TAT - Swab, Nasopharynx [[id number]]  (Normal) Collected:  07/13/20 1915    Lab Status:  Final result Specimen:  Swab from Nasopharynx Updated:  07/13/20 2341     COVID19 Not Detected    Narrative:       Fact sheet for providers: https://www.fda.gov/media/636134/download     Fact sheet for patients: https://www.fda.gov/media/245685/download          Imaging Results (Last 24 Hours)     Procedure Component Value Units Date/Time    CT Chest Without Contrast [[id number]] Collected:  07/13/20 1625     Updated:  07/14/20 0835    Narrative:       EXAMINATION: CT CHEST WO CONTRAST-, CT ABDOMEN AND PELVIS WO CONTRAST-      INDICATION: Fall, cough, mid back pain.      TECHNIQUE: 5 mm unenhanced images through the chest, abdomen and pelvis.     The radiation dose reduction device was turned on for each scan per the  ALARA (As Low as Reasonably Achievable) protocol.     COMPARISON: No recent comparison studies. Chest CT scan from 03/23/2012.     FINDINGS: History indicates fall, mid back pain, cough.     CHEST CT SCAN WITHOUT CONTRAST: There are advanced changes of  centrilobular emphysema. Lungs appear clear of active disease. There is  no pleural effusion. No chest wall hematoma is seen. Bony structures  appear grossly intact. Mediastinal window images show no evidence of  mediastinal hematoma, adenopathy or significant pericardial effusion.       Impression:       Advanced changes of centrilobular emphysema. No evidence of  active chest disease.     ABDOMEN AND PELVIS CT SCAN WITHOUT CONTRAST: Clips are seen in the  gallbladder fossa. No significant abnormalities are seen of the liver,  spleen, pancreas, adrenal glands, or left kidney. There are several  small right renal calculi. There is no evidence of obstructive uropathy.  No upper abdominal free air, ascites,  adenopathy or acute inflammatory  focus is appreciated.     Regarding the lower abdomen and pelvis, bowel loops are normal in  caliber and normal in appearance. No mass, adenopathy, ascites or acute  inflammatory change is seen. There is streak artifact from patient's  right hip prosthesis partially obscures the lower pelvis. There is mild  streak artifact from the patient's lumbar spine fusion.  The bladder  appears normally distended and grossly normal.     Patient history indicates mid back pain. No evidence of acute thoracic  or lumbar compression deformity is seen.     IMPRESSION:   1. No evidence of acute intraabdominal or intrapelvic pathology is seen.  2. Nonobstructing right nephrolithiasis incidentally noted.     D:  07/13/2020  E:  07/14/2020       CT Abdomen Pelvis Without Contrast [[id number]] Collected:  07/13/20 1625     Updated:  07/14/20 0835    Narrative:       EXAMINATION: CT CHEST WO CONTRAST-, CT ABDOMEN AND PELVIS WO CONTRAST-      INDICATION: Fall, cough, mid back pain.      TECHNIQUE: 5 mm unenhanced images through the chest, abdomen and pelvis.     The radiation dose reduction device was turned on for each scan per the  ALARA (As Low as Reasonably Achievable) protocol.     COMPARISON: No recent comparison studies. Chest CT scan from 03/23/2012.     FINDINGS: History indicates fall, mid back pain, cough.     CHEST CT SCAN WITHOUT CONTRAST: There are advanced changes of  centrilobular emphysema. Lungs appear clear of active disease. There is  no pleural effusion. No chest wall hematoma is seen. Bony structures  appear grossly intact. Mediastinal window images show no evidence of  mediastinal hematoma, adenopathy or significant pericardial effusion.       Impression:       Advanced changes of centrilobular emphysema. No evidence of  active chest disease.     ABDOMEN AND PELVIS CT SCAN WITHOUT CONTRAST: Clips are seen in the  gallbladder fossa. No significant abnormalities are seen of the  liver,  spleen, pancreas, adrenal glands, or left kidney. There are several  small right renal calculi. There is no evidence of obstructive uropathy.  No upper abdominal free air, ascites, adenopathy or acute inflammatory  focus is appreciated.     Regarding the lower abdomen and pelvis, bowel loops are normal in  caliber and normal in appearance. No mass, adenopathy, ascites or acute  inflammatory change is seen. There is streak artifact from patient's  right hip prosthesis partially obscures the lower pelvis. There is mild  streak artifact from the patient's lumbar spine fusion.  The bladder  appears normally distended and grossly normal.     Patient history indicates mid back pain. No evidence of acute thoracic  or lumbar compression deformity is seen.     IMPRESSION:   1. No evidence of acute intraabdominal or intrapelvic pathology is seen.  2. Nonobstructing right nephrolithiasis incidentally noted.     D:  07/13/2020  E:  07/14/2020       CT Head Without Contrast [[id number]] Collected:  07/13/20 1615     Updated:  07/13/20 2210    Narrative:       EXAMINATION: CT HEAD WO CONTRAST- 07/13/2020     INDICATION: fall     TECHNIQUE: 5 mm unenhanced images through the brain     The radiation dose reduction device was turned on for each scan per the  ALARA (As Low as Reasonably Achievable) protocol.     COMPARISON: 07/30/2014 head CT scan     FINDINGS: The calvarium appears intact. Included paranasal sinuses and  mastoids appear clear. Soft tissue images show expected degree of  generalized cerebral atrophy for age. Chronic appearing white matter  changes, particularly in the right corona radiata and centrum semiovale  appear stable. There is no evidence of hemorrhage contusion or edema,  mass, mass effect hydrocephalus, or infarct. Prominence of the bifrontal  CSF spaces is similar to prior study and consistent with expansion of  the CSF space due to generalized brain atrophy.       Impression:       Stable head CT  scan with no evidence of acute trauma or  other acute intracranial disease.     D:  07/13/2020  E:  07/13/2020     This report was finalized on 7/13/2020 10:06 PM by Dr. Jaylen Nunez MD.       XR Ankle 3+ View Bilateral [[id number]] Collected:  07/13/20 1559     Updated:  07/13/20 2147    Narrative:       EXAMINATION: XR ANKLE 3+ VW BILATERAL- 07/13/2020     INDICATION: fall     COMPARISON: NONE     FINDINGS: Patient history indicates fall today with bilateral ankle  pain.     Right ankle: Bones of the right ankle joint appear anatomically aligned  and intact. No fracture avulsion or foreign body is seen.       Impression:       No visible right ankle fracture.     Left ankle: Bones of the left ankle joint appear anatomically aligned  and intact. No fracture avulsion or foreign body is seen.     IMPRESSION: No visible left ankle fracture.     D:  07/13/2020  E:  07/13/2020         This report was finalized on 7/13/2020 9:44 PM by Dr. Jaylen Nunez MD.       XR Chest 1 View [[id number]] Collected:  07/13/20 1410     Updated:  07/13/20 2144    Narrative:       EXAMINATION: XR CHEST 1 VW- 07/13/2020     INDICATION: shortness of breath      COMPARISON: [id number]     FINDINGS: Heart, mediastinum and pulmonary vasculature appear within  normal limits.  The lungs appear hyperexpanded and clear except for mild  chronic appearing interstitial changes. No edema effusion or  pneumothorax is seen.       Impression:       No evidence of active chest disease.     D:  07/13/2020  E:  07/13/2020         This report was finalized on 7/13/2020 9:41 PM by Dr. Jaylen Nunez MD.       XR Knee 1 or 2 View Bilateral [[id number]] Collected:  07/13/20 1601     Updated:  07/13/20 2140    Narrative:       EXAMINATION: XR KNEE 1 OR 2 VW BILATERAL- 07/13/2020     INDICATION: fall     COMPARISON: NONE     FINDINGS: Right knee: There is advanced lateral compartment DJD and  milder degenerative change elsewhere. No fracture, foreign body or plain  film  evidence of knee joint effusion is seen.       Impression:       No evidence of acute right knee trauma. Advanced lateral  compartment DJD noted.     Left knee: Left knee joint appears anatomically aligned and intact. No  fracture, avulsion or significant left knee joint effusion is seen.  There is moderate lateral compartment DJD.     IMPRESSION: Left knee DJD but no evidence of acute trauma.     D:  07/13/2020  E:  07/13/2020         This report was finalized on 7/13/2020 9:37 PM by Dr. Jaylen Nunez MD.                  I have reviewed the medications:  Scheduled Meds:  sodium chloride 10 mL Intravenous Q12H     Continuous Infusions:  lactated ringers 100 mL/hr Last Rate: 100 mL/hr (07/14/20 0855)     PRN Meds:.•  acetaminophen  •  docusate sodium  •  potassium chloride **OR** potassium chloride **OR** potassium chloride  •  sodium chloride  •  sodium chloride    Assessment/Plan   Assessment & Plan     Active Hospital Problems    Diagnosis  POA   • **Non-traumatic rhabdomyolysis [M62.82]  Yes   • Cough [R05]  Yes   • Fall [W19.XXXA]  Yes   • Acute kidney injury (CMS/HCC) [N17.9]  Yes   • Essential hypertension [I10]  Yes      Resolved Hospital Problems   No resolved problems to display.        Brief Hospital Course to date:  Sierra Saba is a 79 y.o. female with PMH of HTN that was admitted with cough and history of fall.     Rhabdomyolysis  Recent Fall  AMS Vs Dementia   -CK trending down with fluids ck 1320 down to 628  -UA does not indicate infection   -will ask neuro to see, one of her home med is keppra so, seizure history?  Hyponatremia-improving  -na 129 up to 132  JORDANA  -improving cr 0.74   COVID rule out with recent travel   -COVID negative   HTN    DVT Prophylaxis:  Mechanical     Daily Care Communication  Due to current limited visitation policies, an attempt will be made daily to update patient's identified best point-of-contact(s)   Contact: Judd Saba    Relation: son    Type of communication (phone  or televideo): phone   Time of communication: 0867   Notes (if applicable): I left a voicemail.      Disposition: I expect the patient to be discharged TBD.    CODE STATUS:   Code Status and Medical Interventions:   Ordered at: 07/13/20 1850     Level Of Support Discussed With:    Patient     Code Status:    CPR     Medical Interventions (Level of Support Prior to Arrest):    Full         Electronically signed by Katya Herron DO, 07/14/20, 12:26.

## 2020-07-14 NOTE — THERAPY EVALUATION
Patient Name: Sierra Saba  : 1940    MRN: [id number]                              Today's Date: 2020       Admit Date: 2020    Visit Dx:     ICD-10-CM ICD-9-CM   1. Non-traumatic rhabdomyolysis M62.82 728.88   2. JORDANA (acute kidney injury) (CMS/[facility]) N17.9 584.9   3. Injury of head, initial encounter S09.90XA 959.01   4. Acute back pain, unspecified back location, unspecified back pain laterality M54.9 724.5   5. Back spasm M62.830 724.8   6. Contusion of back, unspecified laterality, initial encounter S20.229A 922.31   7. Fall, initial encounter W19.XXXA E888.9   8. Cough R05 786.2   9. Pulmonary emphysema, unspecified emphysema type (CMS/[facility]) J43.9 492.8   10. Impaired mobility and ADLs Z74.09 V49.89    Z78.9      Patient Active Problem List   Diagnosis   • Non-traumatic rhabdomyolysis   • Cough   • Fall   • Acute kidney injury (CMS/[facility])   • Essential hypertension     Past Medical History:   Diagnosis Date   • Hypertension      Past Surgical History:   Procedure Laterality Date   • BACK SURGERY     • CHOLECYSTECTOMY     • HYSTERECTOMY     • TOTAL HIP ARTHROPLASTY       General Information     Row Name 20 0915          PT Evaluation Time/Intention    Document Type  evaluation  -MP     Mode of Treatment  physical therapy  -MP     Row Name 2015          General Information    Patient Profile Reviewed?  yes  -MP     Prior Level of Function  independent:;transfer;bed mobility;gait Per patient, she has a  and uses [employer] list for grocery shopping  -MP     Existing Precautions/Restrictions  fall  -MP     Barriers to Rehab  none identified  -MP     Row Name 2015          Relationship/Environment    Lives With  alone  -MP     Row Name 2015          Resource/Environmental Concerns    Current Living Arrangements  home/apartment/condo  -MP     Row Name 20          Home Main Entrance    Number of Stairs, Main Entrance  other (see comments) Patient has  ramp  -MP     Row Name 07/14/20 0915          Stairs Within Home, Primary    Stairs, Within Home, Primary  2 steps into den with handrails  -MP     Number of Stairs, Within Home, Primary  two  -MP     Row Name 07/14/20 0915          Cognitive Assessment/Intervention- PT/OT    Orientation Status (Cognition)  oriented to;person  -MP     Row Name 07/14/20 0915          Safety Issues, Functional Mobility    Safety Issues Affecting Function (Mobility)  insight into deficits/self awareness;safety precaution awareness  -MP     Impairments Affecting Function (Mobility)  balance;endurance/activity tolerance;pain;strength  -MP       User Key  (r) = Recorded By, (t) = Taken By, (c) = Cosigned By    Initials Name Provider Type    MP Ollie De La Rosa PT Physical Therapist        Mobility     Row Name 07/14/20 0919          Bed Mobility Assessment/Treatment    Bed Mobility Assessment/Treatment  supine-sit  -MP     Supine-Sit [place] (Bed Mobility)  minimum assist (75% patient effort);1 person assist  -MP     Row Name 07/14/20 0919          Sit-Stand Transfer    Sit-Stand [place] (Transfers)  minimum assist (75% patient effort);2 person assist  -MP     Assistive Device (Sit-Stand Transfers)  walker, front-wheeled  -MP     Row Name 07/14/20 0919          Gait/Stairs Assessment/Training    Gait/Stairs Assessment/Training  gait/ambulation assistive device  -MP     [place] Level (Gait)  minimum assist (75% patient effort);2 person assist  -MP     Assistive Device (Gait)  walker, front-wheeled  -MP     Distance in Feet (Gait)  25'  -MP     Pattern (Gait)  step-through  -MP     Deviations/Abnormal Patterns (Gait)  lisa decreased;gait speed decreased;stride length decreased  -MP     Bilateral Gait Deviations  heel strike decreased  -MP     Comment (Gait/Stairs)  Patient ambulated 25' with RW, min.ax2. Noted rotation of lower body in relation to upper body during ambulation with trunk facing fwd and toes pointed towards  wall; difficulty correcting with commands.  -MP       User Key  (r) = Recorded By, (t) = Taken By, (c) = Cosigned By    Initials Name Provider Type    Ollie Lopez PT Physical Therapist        Obj/Interventions     Row Name 07/14/20 0922          General ROM    GENERAL ROM COMMENTS  B LE's grossly WFL  -MP     Row Name 07/14/20 0922          MMT (Manual Muscle Testing)    General MMT Comments  B LE 4+/5  -MP     Row Name 07/14/20 0922          Static Sitting Balance    Level of [place] (Unsupported Sitting, Static Balance)  standby assist  -MP     Sitting Position (Unsupported Sitting, Static Balance)  sitting on edge of bed  -MP     Time Able to Maintain Position (Unsupported Sitting, Static Balance)  3 to 4 minutes  -MP     Row Name 07/14/20 0922          Static Standing Balance    Level of [place] (Supported Standing, Static Balance)  contact guard assist  -MP     Assistive Device Utilized (Supported Standing, Static Balance)  walker, rolling  -MP     Row Name 07/14/20 0922          Dynamic Standing Balance    Level of [place], Reaches Outside Midline (Standing, Dynamic Balance)  minimal assist, 75% patient effort;2 person assist  -MP     Assistive Device Utilized (Supported Standing, Dynamic Balance)  walker, rolling  -MP     Row Name 07/14/20 0922          Sensory Assessment/Intervention    Sensory General Assessment  no sensation deficits identified  -MP       User Key  (r) = Recorded By, (t) = Taken By, (c) = Cosigned By    Initials Name Provider Type    Ollie Lopez PT Physical Therapist        Goals/Plan     Row Name 07/14/20 0925          Bed Mobility Goal 1 (PT)    Activity/Assistive Device (Bed Mobility Goal 1, PT)  sit to supine;supine to sit  -MP     [place] Level/Cues Needed (Bed Mobility Goal 1, PT)  conditional independence  -MP     Time Frame (Bed Mobility Goal 1, PT)  10 days  -MP     Progress/Outcomes (Bed Mobility Goal 1, PT)  goal ongoing  -MP     Row Name  07/14/20 0925          Transfer Goal 1 (PT)    Activity/Assistive Device (Transfer Goal 1, PT)  sit-to-stand/stand-to-sit  -MP     [place] Level/Cues Needed (Transfer Goal 1, PT)  conditional independence  -MP     Time Frame (Transfer Goal 1, PT)  10 days  -MP     Progress/Outcome (Transfer Goal 1, PT)  goal ongoing  -MP     Row Name 07/14/20 0925          Gait Training Goal 1 (PT)    Activity/Assistive Device (Gait Training Goal 1, PT)  gait (walking locomotion);assistive device use;walker, rolling  -MP     [place] Level (Gait Training Goal 1, PT)  contact guard assist  -MP     Distance (Gait Goal 1, PT)  100'  -MP     Time Frame (Gait Training Goal 1, PT)  10 days  -MP     Progress/Outcome (Gait Training Goal 1, PT)  goal ongoing  -MP       User Key  (r) = Recorded By, (t) = Taken By, (c) = Cosigned By    Initials Name Provider Type    MP Ollie De La Rosa, PT Physical Therapist        Clinical Impression     Row Name 07/14/20 0923          Pain Assessment    Additional Documentation  Pain Scale: Numbers Pre/Post-Treatment (Group)  -MP     Row Name 07/14/20 0923          Pain Scale: Numbers Pre/Post-Treatment    Pain Scale: Numbers, Pretreatment  9/10  -MP     Pain Scale: Numbers, Post-Treatment  9/10  -MP     Pain Location - Side  Bilateral  -MP     Pain Location  other (see comments) C/O of feet and gluteal pain  -MP     Pre/Post Treatment Pain Comment  RN present; patient tolerated.  -MP     Pain Intervention(s)  Repositioned  -MP     Row Name 07/14/20 0923          Physical Therapy Clinical Impression    Patient/Family Goals Statement (PT Clinical Impression)  Improve functional mobility  -MP     Criteria for Skilled Interventions Met (PT Clinical Impression)  yes;treatment indicated  -MP     Rehab Potential (PT Clinical Summary)  good, to achieve stated therapy goals  -MP     Row Name 07/14/20 0923          Vital Signs    Pre Systolic BP Rehab  133  -MP     Pre Treatment Diastolic BP  70  -MP      Pretreatment Heart Rate (beats/min)  102  -MP     Posttreatment Heart Rate (beats/min)  100  -MP     Pre SpO2 (%)  95  -MP     O2 Delivery Pre Treatment  room air  -MP     O2 Delivery Intra Treatment  room air  -MP     Post SpO2 (%)  94  -MP     O2 Delivery Post Treatment  room air  -MP     Pre Patient Position  Supine  -MP     Intra Patient Position  Standing  -MP     Post Patient Position  Sitting  -MP     Row Name 07/14/20 0923          Positioning and Restraints    Pre-Treatment Position  in bed  -MP     Post Treatment Position  chair  -MP     In Chair  reclined;call light within reach;encouraged to call for assist;exit alarm on;waffle cushion;on mechanical lift sling;legs elevated  -MP       User Key  (r) = Recorded By, (t) = Taken By, (c) = Cosigned By    Initials Name Provider Type    Ollie Lopez PT Physical Therapist        Outcome Measures     Row Name 07/14/20 0926          How much help from another person do you currently need...    Turning from your back to your side while in flat bed without using bedrails?  3  -MP     Moving from lying on back to sitting on the side of a flat bed without bedrails?  3  -MP     Moving to and from a bed to a chair (including a wheelchair)?  3  -MP     Standing up from a chair using your arms (e.g., wheelchair, bedside chair)?  3  -MP     Climbing 3-5 steps with a railing?  2  -MP     To walk in hospital room?  3  -MP     AM-PAC 6 Clicks Score (PT)  17  -MP     Row Name 07/14/20 0926          Functional Assessment    Outcome Measure Options  AM-PAC 6 Clicks Basic Mobility (PT)  -MP       User Key  (r) = Recorded By, (t) = Taken By, (c) = Cosigned By    Initials Name Provider Type    Ollie Lopez PT Physical Therapist        Physical Therapy Education                 Title: PT OT SLP Therapies (In Progress)     Topic: Physical Therapy (In Progress)     Point: Mobility training (In Progress)     Description:   Instruct learner(s) on safety and technique for  assisting patient out of bed, chair or wheelchair.  Instruct in the proper use of assistive devices, such as walker, crutches, cane or brace.              Patient Friendly Description:   It's important to get you on your feet again, but we need to do so in a way that is safe for you. Falling has serious consequences, and your personal safety is the most important thing of all.        When it's time to get out of bed, one of us or a family member will sit next to you on the bed to give you support.     If your doctor or nurse tells you to use a walker, crutches, a cane, or a brace, be sure you use it every time you get out of bed, even if you think you don't need it.    Learning Progress Summary           Patient Acceptance, E,D, NR by  at 7/14/2020 0927                   Point: Home exercise program (Not Started)     Description:   Instruct learner(s) on appropriate technique for monitoring, assisting and/or progressing patient with therapeutic exercises and activities.              Learner Progress:   Not documented in this visit.          Point: Body mechanics (In Progress)     Description:   Instruct learner(s) on proper positioning and spine alignment for patient and/or caregiver during mobility tasks and/or exercises.              Learning Progress Summary           Patient Acceptance, E,D, NR by NIRALI at 7/14/2020 0927                   Point: Precautions (In Progress)     Description:   Instruct learner(s) on prescribed precautions during mobility and gait tasks              Learning Progress Summary           Patient Acceptance, E,D, NR by  at 7/14/2020 0927                               User Key     Initials Effective Dates Name Provider Type Discipline     11/06/19 -  Ollie De La Rosa PT Physical Therapist PT              PT Recommendation and Plan  Planned Therapy Interventions (PT Eval): balance training, bed mobility training, gait training, home exercise program, patient/family education,  strengthening, transfer training  Outcome Summary/Treatment Plan (PT)  Anticipated Discharge Disposition (PT): inpatient rehabilitation facility  Plan of Care Reviewed With: patient  Outcome Summary: PT IE completed. Patient presents with deficits in functional mobility limited by generalized weakness and decreased functional endurance warranting further skilled PTx. Patient required min.ax2 for sit to stand transfer. Patient ambulated 25' with RW, min.ax2 with verbal cues needed for positoning of AD. Recommend d/c to inpatient rehab.     Time Calculation:   PT Charges     Row Name 07/14/20 0900             Time Calculation    Start Time  0900  -MP      PT Received On  07/14/20  -MP      PT Goal Re-Cert Due Date  07/24/20  -MP        User Key  (r) = Recorded By, (t) = Taken By, (c) = Cosigned By    Initials Name Provider Type    Ollie Lopez PT Physical Therapist        Therapy Charges for Today     Code Description Service Date Service Provider Modifiers Qty    29069214091 HC PT EVAL LOW COMPLEXITY 4 7/14/2020 Ollie De La Rosa PT GP 1          PT G-Codes  Outcome Measure Options: AM-PAC 6 Clicks Basic Mobility (PT)  AM-PAC 6 Clicks Score (PT): 17    Ollie De La Rosa PT  7/14/2020

## 2020-07-14 NOTE — THERAPY EVALUATION
Acute Care - Occupational Therapy Initial Evaluation  [facility]     Patient Name: Sierra Saba  : 1940  MRN: [id number]  Today's Date: 2020             Admit Date: 2020       ICD-10-CM ICD-9-CM   1. Non-traumatic rhabdomyolysis M62.82 728.88   2. JORDANA (acute kidney injury) (CMS/HCC) N17.9 584.9   3. Injury of head, initial encounter S09.90XA 959.01   4. Acute back pain, unspecified back location, unspecified back pain laterality M54.9 724.5   5. Back spasm M62.830 724.8   6. Contusion of back, unspecified laterality, initial encounter S20.229A 922.31   7. Fall, initial encounter W19.XXXA E888.9   8. Cough R05 786.2   9. Pulmonary emphysema, unspecified emphysema type (CMS/HCC) J43.9 492.8   10. Impaired mobility and ADLs Z74.09 V49.89    Z78.9      Patient Active Problem List   Diagnosis   • Non-traumatic rhabdomyolysis   • Cough   • Fall   • Acute kidney injury (CMS/HCC)   • Essential hypertension     Past Medical History:   Diagnosis Date   • Hypertension      Past Surgical History:   Procedure Laterality Date   • BACK SURGERY     • CHOLECYSTECTOMY     • HYSTERECTOMY     • TOTAL HIP ARTHROPLASTY            OT ASSESSMENT FLOWSHEET (last 12 hours)      Occupational Therapy Evaluation     Row Name 20 0845                   OT Evaluation Time/Intention    Subjective Information  complains of;pain  -JOSE ELIAS        Document Type  evaluation  -JOSE ELIAS        Mode of Treatment  occupational therapy  -JOSE ELIAS        Patient Effort  good  -JOSE ELIAS        Symptoms Noted During/After Treatment  none  -JOSE ELIAS           General Information    Patient Profile Reviewed?  yes  -JOSE ELIAS        Patient Observations  alert;cooperative;agree to therapy  -JOSE ELIAS        Patient/Family Observations  no family present  -JOSE ELIAS        General Observations of Patient  sitting up in bed sipping coffee  -JOSE ELIAS        Prior Level of Function  independent:;all household mobility;ADL's;cooking;max assist:;cleaning uses click list to shop  -JOSE ELIAS        Equipment  Currently Used at Home  cane, straight;commode, bedside;walker, rolling;grab bar  -JOSE ELIAS        Existing Precautions/Restrictions  fall  -JOSE ELIAS        Risks Reviewed  patient:;LOB;increased discomfort;change in vital signs;lines disloged  -JOSE ELIAS        Benefits Reviewed  patient:;improve function;increase independence;increase strength;increase balance;increase knowledge  -JOSE ELIAS        Barriers to Rehab  previous functional deficit  -JOSE ELIAS           Relationship/Environment    Lives With  alone  -JOSE ELIAS           Resource/Environmental Concerns    Current Living Arrangements  home/apartment/condo  -JOSE ELIAS           Home Main Entrance    Number of Stairs, Main Entrance  other (see comments) ramp  -JOSE ELIAS           Stairs Within Home, Primary    Stairs, Within Home, Primary  2 steps with rails, where pt. fell and unable to get up.  -JOSE ELIAS        Number of Stairs, Within Home, Primary  two  -JOSE ELIAS           Cognitive Assessment/Intervention- PT/OT    Orientation Status (Cognition)  oriented to;person  -JOSE ELIAS        Follows Commands (Cognition)  follows one step commands;over 90% accuracy;repetition of directions required  -JOSE ELIAS        Safety Deficit (Cognitive)  mild deficit;safety precautions awareness;safety precautions follow-through/compliance  -JOSE ELIAS           Safety Issues, Functional Mobility    Safety Issues Affecting Function (Mobility)  insight into deficits/self awareness;safety precaution awareness;safety precautions follow-through/compliance  -JOSE ELIAS        Impairments Affecting Function (Mobility)  balance;endurance/activity tolerance;strength;postural/trunk control  -JOSE ELIAS           Bed Mobility Assessment/Treatment    Bed Mobility Assessment/Treatment  supine-sit;scooting/bridging  -JOSE ELIAS        Scooting/Bridging [place] (Bed Mobility)  supervision;verbal cues  -JOSE ELIAS        Supine-Sit [place] (Bed Mobility)  minimum assist (75% patient effort);verbal cues  -JOSE ELIAS        Bed Mobility, Safety Issues  decreased use of legs for  bridging/pushing;impaired trunk control for bed mobility  -        Assistive Device (Bed Mobility)  head of bed elevated  -           Functional Mobility    Functional Mobility- Ind. Level  minimum assist (75% patient effort);1 person  -        Functional Mobility- Device  rolling walker  -        Functional Mobility-Distance (Feet)  20  -JOSE ELIAS        Functional Mobility- Safety Issues  step length decreased;sequencing ability decreased  -        Functional Mobility- Comment  to bathroom for toileting.  Pt. tending to want to turn self to side when stepping and cues to stay close to walker.  -           Transfer Assessment/Treatment    Transfer Assessment/Treatment  sit-stand transfer;stand-sit transfer;toilet transfer  -           Sit-Stand Transfer    Sit-Stand [place] (Transfers)  minimum assist (75% patient effort);nonverbal cues (demo/gesture);verbal cues  -        Assistive Device (Sit-Stand Transfers)  walker, front-wheeled  -           Stand-Sit Transfer    Stand-Sit [place] (Transfers)  minimum assist (75% patient effort);verbal cues;nonverbal cues (demo/gesture)  -        Assistive Device (Stand-Sit Transfers)  walker, front-wheeled  -           Toilet Transfer    Type (Toilet Transfer)  sit-stand;stand-sit  -        [place] Level (Toilet Transfer)  minimum assist (75% patient effort);nonverbal cues (demo/gesture);verbal cues  -        Assistive Device (Toilet Transfer)  commode, bedside without drop arms  -           ADL Assessment/Intervention    BADL Assessment/Intervention  lower body dressing;upper body dressing;grooming;toileting  -           Upper Body Dressing Assessment/Training    Upper Body Dressing [place] Level  don;doff;pajama/evan;moderate assist (50% patient effort) limited by evan  -        Upper Body Dressing Position  unsupported sitting  -           Lower Body Dressing Assessment/Training    Lower Body Dressing [place] Level   doff;don;socks;shoes/slippers  -JOSE ELIAS        Lower Body Dressing Position  supported sitting  -JOSE ELIAS        Comment (Lower Body Dressing)  pt. simulated reaching for feet and could only reach to shins, professional judgement max A  -JOSE ELIAS           Grooming Assessment/Training    Comment (Grooming)  pt. cleaned hands with hand gel sitting.  -JOSE LEIAS           Toileting Assessment/Training    [place] Level (Toileting)  adjust/manage clothing;minimum assist (75% patient effort);verbal cues;perform perineal hygiene;supervision  -JOSE ELIAS        Assistive Devices (Toileting)  commode, bedside without drop arms BSC over toilet  -JOSE ELIAS        Toileting Position  supported sitting;supported standing  -JOSE ELIAS        Comment (Toileting)  cues to get balance before attempting to pullup undergarment.  -JOSE ELIAS           BADL Safety/Performance    Impairments, BADL Safety/Performance  balance;endurance/activity tolerance;trunk/postural control;strength;range of motion;pain  -JOSE ELIAS        Skilled BADL Treatment/Intervention  cognitive/safety deficit modifications  -JOSE ELIAS           General ROM    GENERAL ROM COMMENTS  BUE grossly intact AROM  -JOSE ELIAS           MMT (Manual Muscle Testing)    General MMT Comments  BUE grossly 4+ to 5/5  -JOSE ELIAS           Static Sitting Balance    Level of [place] (Unsupported Sitting, Static Balance)  standby assist  -JOSE ELIAS        Sitting Position (Unsupported Sitting, Static Balance)  sitting on edge of bed  -JOSE ELIAS           Static Standing Balance    Level of [place] (Supported Standing, Static Balance)  contact guard assist  -JOSE ELIAS        Assistive Device Utilized (Supported Standing, Static Balance)  walker, rolling  -JOSE ELIAS           Dynamic Standing Balance    Level of [place], Reaches Outside Midline (Standing, Dynamic Balance)  minimal assist, 75% patient effort  -JOSE ELIAS        Assistive Device Utilized (Supported Standing, Dynamic Balance)  walker, rolling  -JOSE ELIAS           Sensory Assessment/Intervention    Sensory General Assessment   no sensation deficits identified  -JOSE ELIAS           Positioning and Restraints    Pre-Treatment Position  in bed  -JOSE ELIAS        Post Treatment Position  chair  -JOSE ELIAS        In Chair  reclined;call light within reach;encouraged to call for assist;exit alarm on;waffle cushion;on mechanical lift sling  -JOSE ELIAS           Pain Scale: Numbers Pre/Post-Treatment    Pain Scale: Numbers, Pretreatment  9/10  -JOSE ELIAS        Pain Scale: Numbers, Post-Treatment  9/10  -JOSE ELIAS        Pain Location - Side  Bilateral  -JOSE ELIAS        Pain Location  -- feet and buttocks  -JOSE ELIAS        Pain Intervention(s)  Repositioned  -JOSE ELIAS           Clinical Impression (OT)    OT Diagnosis  impaired ADL independence  -JOSE ELIAS        Patient/Family Goals Statement (OT Eval)  return to PLOF  -JOSE ELIAS        Criteria for Skilled Therapeutic Interventions Met (OT Eval)  yes;treatment indicated  -JOSE ELIAS        Rehab Potential (OT Eval)  good, to achieve stated therapy goals  -JOSE ELIAS        Therapy Frequency (OT Eval)  daily  -JOSE ELIAS        Care Plan Review (OT)  evaluation/treatment results reviewed;care plan/treatment goals reviewed;risks/benefits reviewed;current/potential barriers reviewed;patient/other agree to care plan  -JOSE ELIAS        Anticipated Equipment Needs at Discharge (OT)  bathing equipment;dressing equipment  -JOSE ELIAS        Anticipated Discharge Disposition (OT)  inpatient rehabilitation facility  -JOSE ELIAS           Vital Signs    Pre Systolic BP Rehab  133  -JOSE ELIAS        Pre Treatment Diastolic BP  70  -JOSE ELIAS        Pretreatment Heart Rate (beats/min)  101  -JOSE ELIAS        Posttreatment Heart Rate (beats/min)  106  -JOSE ELIAS        Pre SpO2 (%)  94  -JOSE ELIAS        O2 Delivery Pre Treatment  room air  -JOSE ELIAS        Post SpO2 (%)  94  -JOSE ELIAS        O2 Delivery Post Treatment  room air  -JOSE ELIAS        Pre Patient Position  Supine  -JOSE ELIAS        Intra Patient Position  Standing  -JOSE ELIAS        Post Patient Position  Sitting  -JOSE ELIAS           Planned OT Interventions    Planned Therapy Interventions (OT Eval)  activity tolerance  training;adaptive equipment training;BADL retraining;functional balance retraining;occupation/activity based interventions;patient/caregiver education/training;ROM/therapeutic exercise;strengthening exercise;transfer/mobility retraining  -JOSE ELIAS           OT Goals    Bed Mobility Goal Selection (OT)  bed mobility, OT goal 1  -JOSE ELIAS        Transfer Goal Selection (OT)  transfer, OT goal 1  -JOSE ELIAS        Bathing Goal Selection (OT)  bathing, OT goal 1  -JOSE ELIAS        Dressing Goal Selection (OT)  dressing, OT goal 1  -JOSE ELIAS        Safety Awareness Goal Selection (OT)  safety awareness, OT goal 1  -JOSE ELIAS        Additional Documentation  Safety Awareness Goal Selection (OT) (Row)  -JOSE ELIAS           Bed Mobility Goal 1 (OT)    Activity/Assistive Device (Bed Mobility Goal 1, OT)  bed mobility activities, all;bed rails  -JOSE ELIAS        [place] Level/Cues Needed (Bed Mobility Goal 1, OT)  supervision required  -JOSE ELIAS        Time Frame (Bed Mobility Goal 1, OT)  long term goal (LTG);10 days  -JOSE ELIAS        Progress/Outcomes (Bed Mobility Goal 1, OT)  goal ongoing  -JOSE ELIAS           Transfer Goal 1 (OT)    Activity/Assistive Device (Transfer Goal 1, OT)  sit-to-stand/stand-to-sit;bed-to-chair/chair-to-bed;toilet;commode, bedside without drop arms;walker, rolling  -JOSE ELIAS        [place] Level/Cues Needed (Transfer Goal 1, OT)  supervision required;verbal cues required  -JOSE ELIAS        Time Frame (Transfer Goal 1, OT)  long term goal (LTG);10 days  -JOSE ELIAS        Progress/Outcome (Transfer Goal 1, OT)  goal ongoing  -JOSE ELIAS           Bathing Goal 1 (OT)    Activity/Assistive Device (Bathing Goal 1, OT)  upper body bathing;lower body bathing;long-handled sponge  -JOSE ELIAS        [place] Level/Cues Needed (Bathing Goal 1, OT)  set-up required;standby assist  -JOSE ELIAS        Time Frame (Bathing Goal 1, OT)  long term goal (LTG);10 days  -JOSE ELIAS        Progress/Outcomes (Bathing Goal 1, OT)  goal ongoing  -JOSE ELIAS           Dressing Goal 1 (OT)    Activity/Assistive Device (Dressing Goal 1,  OT)  lower body dressing;long handled shoe horn;reacher;sock-aid AE prn  -JOSE ELIAS        [place]/Cues Needed (Dressing Goal 1, OT)  standby assist;verbal cues required  -JOSE ELIAS        Time Frame (Dressing Goal 1, OT)  long term goal (LTG);10 days  -JOSE ELIAS        Progress/Outcome (Dressing Goal 1, OT)  goal ongoing  -JOSE ELIAS           Safety Awareness Goal 1 (OT)    Activity (Safety Awareness Goal 1, OT)  demonstration of safe behaviors;follow through of safety precautions;safe use of assistive device/equipment ADL task, household mobility, transfers  -JOSE ELIAS        [place]/Cues/Accuracy (Safety Awareness Goal 1, OT)  with minimum;verbal cues/redirection  -JOSE ELIAS        Time Frame (Safety Awareness Goal 1, OT)  long term goal (LTG);10 days  -JOSE ELIAS        Progress/Outcome (Safety Awareness Goal 1, OT)  goal ongoing  -JOSE ELIAS           Living Environment    Home Accessibility  stairs within home;tub/shower is not walk in ramp into home  -JOSE ELIAS          User Key  (r) = Recorded By, (t) = Taken By, (c) = Cosigned By    Initials Name Effective Dates    Alaina Wellington, OT 06/08/18 -          Occupational Therapy Education                 Title: PT OT SLP Therapies (In Progress)     Topic: Occupational Therapy (In Progress)     Point: ADL training (In Progress)     Description:   Instruct learner(s) on proper safety adaptation and remediation techniques during self care or transfers.   Instruct in proper use of assistive devices.              Learning Progress Summary           Patient Acceptance, E,D, NR by JOSE ELIAS at 7/14/2020 0845    Comment:  reason for consult, noted deficits, goals setting, transfer safety, recommend rehab                   Point: Home exercise program (Not Started)     Description:   Instruct learner(s) on appropriate technique for monitoring, assisting and/or progressing therapeutic exercises/activities.              Learner Progress:   Not documented in this visit.          Point: Precautions (In Progress)     Description:    Instruct learner(s) on prescribed precautions during self-care and functional transfers.              Learning Progress Summary           Patient Acceptance, E,D, NR by JOSE ELIAS at 7/14/2020 0845    Comment:  reason for consult, noted deficits, goals setting, transfer safety, recommend rehab                   Point: Body mechanics (Not Started)     Description:   Instruct learner(s) on proper positioning and spine alignment during self-care, functional mobility activities and/or exercises.              Learner Progress:   Not documented in this visit.                      User Key     Initials Effective Dates Name Provider Type Discipline    JOSE ELIAS 06/08/18 -  Alaina Martinez, OT Occupational Therapist OT                  OT Recommendation and Plan  Outcome Summary/Treatment Plan (OT)  Anticipated Equipment Needs at Discharge (OT): bathing equipment, dressing equipment  Anticipated Discharge Disposition (OT): inpatient rehabilitation facility  Planned Therapy Interventions (OT Eval): activity tolerance training, adaptive equipment training, BADL retraining, functional balance retraining, occupation/activity based interventions, patient/caregiver education/training, ROM/therapeutic exercise, strengthening exercise, transfer/mobility retraining  Therapy Frequency (OT Eval): daily  Plan of Care Review  Plan of Care Reviewed With: patient  Plan of Care Reviewed With: patient  Outcome Summary: OT evaluation completed.  Pt. demonstrated decreased ADL independence due to decreased ROM, balance and safety.  Pt. appropriate for skilled OT services to address deficit areas.  Pt. overall mod A with higher level ADL tasks and min A with transfers.  Limited safety with all.  Recommend IP rehab at discharge.    Outcome Measures     Row Name 07/14/20 0845             How much help from another is currently needed...    Putting on and taking off regular lower body clothing?  2  -JOSE ELIAS      Bathing (including washing, rinsing, and drying)  3   -JOSE ELIAS      Toileting (which includes using toilet bed pan or urinal)  3  -JOSE ELIAS      Putting on and taking off regular upper body clothing  2  -JOSE ELIAS      Taking care of personal grooming (such as brushing teeth)  3  -JOSE ELIAS      Eating meals  4  -JOSE ELIAS      AM-PAC 6 Clicks Score (OT)  17  -JOSE ELIAS         Functional Assessment    Outcome Measure Options  AM-PAC 6 Clicks Daily Activity (OT)  -JOSE ELIAS        User Key  (r) = Recorded By, (t) = Taken By, (c) = Cosigned By    Initials Name Provider Type    Alaina Wellington OT Occupational Therapist          Time Calculation:   Time Calculation- OT     Row Name 07/14/20 0935             Time Calculation- OT    OT Start Time  0845  -JOSE ELIAS      OT Received On  07/14/20  -JOSE ELIAS      OT Goal Re-Cert Due Date  07/24/20  -JOSE ELIAS        User Key  (r) = Recorded By, (t) = Taken By, (c) = Cosigned By    Initials Name Provider Type    Alaina Wellington OT Occupational Therapist        Therapy Charges for Today     Code Description Service Date Service Provider Modifiers Qty    [id number]  OT EVAL LOW COMPLEXITY 4 7/14/2020 Alaina Martinez OT GO 1               Alaina Martinez OT  7/14/2020

## 2020-07-14 NOTE — PLAN OF CARE
Problem: Patient Care Overview  Goal: Plan of Care Review  Outcome: Ongoing (interventions implemented as appropriate)  Flowsheets (Taken 7/14/2020 1059)  Progress: no change  Plan of Care Reviewed With: patient  Outcome Summary: OT evaluation completed.  Pt. demonstrated decreased ADL independence due to decreased ROM, balance and safety.  Pt. appropriate for skilled OT services to address deficit areas.  Pt. overall mod A with higher level ADL tasks and min A with transfers.  Limited safety with all.  Recommend IP rehab at discharge.

## 2020-07-15 LAB
ANION GAP SERPL CALCULATED.3IONS-SCNC: 11 MMOL/L (ref 5–15)
BASOPHILS # BLD AUTO: 0.1 10*3/MM3 (ref 0–0.2)
BASOPHILS NFR BLD AUTO: 1.1 % (ref 0–1.5)
BUN SERPL-MCNC: 14 MG/DL (ref 8–23)
BUN/CREAT SERPL: 24.6 (ref 7–25)
CALCIUM SPEC-SCNC: 9.1 MG/DL (ref 8.6–10.5)
CHLORIDE SERPL-SCNC: 100 MMOL/L (ref 98–107)
CK SERPL-CCNC: 420 U/L (ref 20–180)
CO2 SERPL-SCNC: 25 MMOL/L (ref 22–29)
CREAT SERPL-MCNC: 0.57 MG/DL (ref 0.57–1)
DEPRECATED RDW RBC AUTO: 51 FL (ref 37–54)
EOSINOPHIL # BLD AUTO: 0.43 10*3/MM3 (ref 0–0.4)
EOSINOPHIL NFR BLD AUTO: 4.7 % (ref 0.3–6.2)
ERYTHROCYTE [DISTWIDTH] IN BLOOD BY AUTOMATED COUNT: 14 % (ref 12.3–15.4)
GFR SERPL CREATININE-BSD FRML MDRD: 103 ML/MIN/1.73
GLUCOSE SERPL-MCNC: 82 MG/DL (ref 65–99)
HCT VFR BLD AUTO: 44.8 % (ref 34–46.6)
HGB BLD-MCNC: 14.6 G/DL (ref 12–15.9)
IMM GRANULOCYTES # BLD AUTO: 0.1 10*3/MM3 (ref 0–0.05)
IMM GRANULOCYTES NFR BLD AUTO: 1.1 % (ref 0–0.5)
LYMPHOCYTES # BLD AUTO: 1.78 10*3/MM3 (ref 0.7–3.1)
LYMPHOCYTES NFR BLD AUTO: 19.4 % (ref 19.6–45.3)
MCH RBC QN AUTO: 32 PG (ref 26.6–33)
MCHC RBC AUTO-ENTMCNC: 32.6 G/DL (ref 31.5–35.7)
MCV RBC AUTO: 98.2 FL (ref 79–97)
MONOCYTES # BLD AUTO: 0.81 10*3/MM3 (ref 0.1–0.9)
MONOCYTES NFR BLD AUTO: 8.8 % (ref 5–12)
NEUTROPHILS NFR BLD AUTO: 5.97 10*3/MM3 (ref 1.7–7)
NEUTROPHILS NFR BLD AUTO: 64.9 % (ref 42.7–76)
NRBC BLD AUTO-RTO: 0 /100 WBC (ref 0–0.2)
PLATELET # BLD AUTO: 265 10*3/MM3 (ref 140–450)
PMV BLD AUTO: 9.8 FL (ref 6–12)
POTASSIUM SERPL-SCNC: 4.1 MMOL/L (ref 3.5–5.2)
POTASSIUM SERPL-SCNC: 4.1 MMOL/L (ref 3.5–5.2)
RBC # BLD AUTO: 4.56 10*6/MM3 (ref 3.77–5.28)
SODIUM SERPL-SCNC: 136 MMOL/L (ref 136–145)
WBC # BLD AUTO: 9.19 10*3/MM3 (ref 3.4–10.8)

## 2020-07-15 PROCEDURE — 99222 1ST HOSP IP/OBS MODERATE 55: CPT | Performed by: PSYCHIATRY & NEUROLOGY

## 2020-07-15 PROCEDURE — 84132 ASSAY OF SERUM POTASSIUM: CPT | Performed by: FAMILY MEDICINE

## 2020-07-15 PROCEDURE — 99232 SBSQ HOSP IP/OBS MODERATE 35: CPT | Performed by: FAMILY MEDICINE

## 2020-07-15 PROCEDURE — 80048 BASIC METABOLIC PNL TOTAL CA: CPT | Performed by: FAMILY MEDICINE

## 2020-07-15 PROCEDURE — 82607 VITAMIN B-12: CPT | Performed by: PSYCHIATRY & NEUROLOGY

## 2020-07-15 PROCEDURE — 85025 COMPLETE CBC W/AUTO DIFF WBC: CPT | Performed by: FAMILY MEDICINE

## 2020-07-15 PROCEDURE — 82746 ASSAY OF FOLIC ACID SERUM: CPT | Performed by: PSYCHIATRY & NEUROLOGY

## 2020-07-15 PROCEDURE — 82550 ASSAY OF CK (CPK): CPT | Performed by: FAMILY MEDICINE

## 2020-07-15 RX ORDER — LEVETIRACETAM 500 MG/1
500 TABLET ORAL EVERY 12 HOURS SCHEDULED
Status: DISCONTINUED | OUTPATIENT
Start: 2020-07-15 | End: 2020-07-17 | Stop reason: HOSPADM

## 2020-07-15 RX ORDER — DIAZEPAM 5 MG/1
5 TABLET ORAL EVERY 8 HOURS PRN
Status: DISCONTINUED | OUTPATIENT
Start: 2020-07-15 | End: 2020-07-17 | Stop reason: HOSPADM

## 2020-07-15 RX ORDER — PAROXETINE HYDROCHLORIDE 20 MG/1
20 TABLET, FILM COATED ORAL DAILY
Status: DISCONTINUED | OUTPATIENT
Start: 2020-07-16 | End: 2020-07-17 | Stop reason: HOSPADM

## 2020-07-15 RX ORDER — LEVETIRACETAM 500 MG/1
500 TABLET ORAL EVERY 12 HOURS SCHEDULED
Status: DISCONTINUED | OUTPATIENT
Start: 2020-07-15 | End: 2020-07-15 | Stop reason: SDUPTHER

## 2020-07-15 RX ADMIN — ACETAMINOPHEN 650 MG: 325 TABLET, FILM COATED ORAL at 11:53

## 2020-07-15 RX ADMIN — LEVETIRACETAM 500 MG: 500 TABLET ORAL at 20:03

## 2020-07-15 RX ADMIN — SODIUM CHLORIDE, POTASSIUM CHLORIDE, SODIUM LACTATE AND CALCIUM CHLORIDE 100 ML/HR: 600; 310; 30; 20 INJECTION, SOLUTION INTRAVENOUS at 06:35

## 2020-07-15 RX ADMIN — ACETAMINOPHEN 650 MG: 325 TABLET, FILM COATED ORAL at 20:03

## 2020-07-15 RX ADMIN — SODIUM CHLORIDE, POTASSIUM CHLORIDE, SODIUM LACTATE AND CALCIUM CHLORIDE 100 ML/HR: 600; 310; 30; 20 INJECTION, SOLUTION INTRAVENOUS at 16:31

## 2020-07-15 NOTE — PLAN OF CARE
Problem: Patient Care Overview  Goal: Plan of Care Review  Outcome: Ongoing (interventions implemented as appropriate)  Note:   Pt complained of generalized toe pain, toe's red and blanchable. Pt declines pain meds other than Tylenol. Pt's potassium replace, redraw was 4.1.  Pt's VSS, NSR, continue POC.

## 2020-07-15 NOTE — CONSULTS
Referring Provider: Dr. Herron  Reason for Consultation: AMS versus dementia    Patient Care Team:  Praveen Page MD as PCP - General  Praveen Page MD as PCP - Family Medicine    Chief complaint fall, ams    Subjective .     History of present illness: 79-yo RH woman with PMH notable for HTN, back surgery, total hip arthroplasty admitted on 7/13/2020 with complaint of cough.  She reported falling a week prior in her home and was unable to get up, had to crawl to her telephone and lay on the ground all night.  She was finally able to call for help and then refused paramedics bring her to the ED.  She continued to have diffuse body pain and back pain prompting her presentation on 7/13/2020.  She denied focal weakness but felt generally weak, also denied dysuria, hematuria, fever abdominal pain, or shortness of breath.  In the ED her heart rate was 120, blood pressure normal at 108/57 O2 sat 93% on room air.  Creatinine was elevated at 1.51 and sodium was 129.  CK was elevated at 1320, chest x-ray was unremarkable while CT of the chest showed advanced changes of centrilobular emphysema.  Head CT (images reviewed) was unremarkable as well.  Patient has been confused during this admission with tangential thoughts.  Son via phone reported to case management the patient has been in and out of the hospital, usually due to self-medication with Valium.  He recently brought her to [place] for 2 weeks and noted no problems other than some impairment of balance.  She uses a straight cane at home, drives and only uses rolling walker for long outings.  Son was reluctant for rehab due to COVID.  Medications at home include Keppra, no neurologic evaluation here.  Patient reports she is on Keppra due to a seizure that happened some years ago when she was on Chantix.  She saw Dr. Whaley at that time.  She also reports a remote history of meningitis when her twin sons were young treated at [facility].  She denies any  headache, or any current neck or back pain.  She notes her balance is not good and she has weakness in her legs which has not recently changed.  She denies new problems with bladder or bowel control.  She did ambulate 25 feet with a rolling walker with PT and minimum assist x2.  She reports she did recently travel to [place] with her son and stated on the [place], but reports she was extremely careful about exposure to others and always wears a mask in social situations.    Review of Systems  Pertinent items are noted in HPI, all other systems reviewed and negative     History  Past Medical History:   Diagnosis Date   • Hypertension    ,   Past Surgical History:   Procedure Laterality Date   • BACK SURGERY     • CHOLECYSTECTOMY     • HYSTERECTOMY     • TOTAL HIP ARTHROPLASTY     , History reviewed. No pertinent family history.,   Social History     Tobacco Use   • Smoking status: Former Smoker   Substance Use Topics   • Alcohol use: Not Currently   • Drug use: Never   ,   Medications Prior to Admission   Medication Sig Dispense Refill Last Dose   • acetaminophen (TYLENOL) 325 MG tablet Take 650 mg by mouth Every 4 (Four) Hours As Needed for Mild Pain .      • PARoxetine (Paxil) 20 MG tablet Take 20 mg by mouth Daily.      • diazePAM (Valium) 5 MG tablet Take 5 mg by mouth 3 (Three) Times a Day As Needed.      • levETIRAcetam (Keppra) 500 MG tablet Take 500 mg by mouth Every 12 (Twelve) Hours.      • lisinopril (PRINIVIL,ZESTRIL) 5 MG tablet Take 5 mg by mouth Every Night.      , Scheduled Meds:    sodium chloride 10 mL Intravenous Q12H   , Continuous Infusions:    lactated ringers 100 mL/hr Last Rate: 100 mL/hr (07/15/20 0635)   , PRN Meds:  •  acetaminophen  •  docusate sodium  •  potassium chloride **OR** potassium chloride **OR** potassium chloride  •  sodium chloride  •  sodium chloride and Allergies:  Penicillins    Objective     Vital Signs   Blood pressure 140/90, pulse 93, temperature 96.6 °F (35.9 °C),  "temperature source Oral, resp. rate 20, height 165.1 cm (65\"), weight 61.2 kg (134 lb 14.4 oz), SpO2 95 %.    Physical Exam:   Well-developed elderly white woman sitting up in the hospital bed eating lunch in no acute distress.  She is currently alert and fully oriented, with normal language, attention and fund of knowledge.  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  She has no dysarthria.  Pupils 2.5 mm and reactive, visual fields full to confrontation, extraocular movements intact; she does have nystagmus but volunteers that this is chronic.  Normal facial sensation and movement, hearing intact to normal volume voice, palate and shoulders elevates symmetrically and tongue protrudes midline  Motor: Upper extremity strength is full, mild proximal lower extremity weakness more notable on the right, distal slight weakness on the left more than right.  Muscle tone is normal  Reflexes 2+ at the upper extremities and knees, trace to absent at the ankles, no response to plantar stim  Light touch intact and symmetric  Normal finger-nose-finger  Neck supple, no carotid bruit appreciated  Heart RRR no murmur appreciated  Lungs clear to auscultation, normal respiratory effort  Abdomen soft, NT, ND with positive bowel sounds  Extremities without cyanosis or edema  Skin warm and dry, a few abrasions around her knees which she reports is from the fall.    Results Review:   I reviewed the patient's new clinical results.  I reviewed the patient's new imaging results and agree with the interpretation.  I reviewed the patient's other test results and agree with the interpretation  Labs reviewed  BMP unremarkable  CBC with slight increase MCV at 98 otherwise unremarkable  CK now down to 420  UA showed small blood, trace protein 15 ketones 3-6 red cells otherwise normal  Head CT images " reviewed, no acute abnormality noted but predominantly frontal atrophy        Assessment/Plan       Non-traumatic rhabdomyolysis    Cough    Fall    Acute kidney injury (CMS/HCC)    Essential hypertension      1.  AMS-this appears to have been transient.  Given her age, she is certainly vulnerable to hospital-induced delirium.  No evidence of seizure or stroke.  No current evidence of incipient dementia although history not sufficient to entirely rule this out.  Will check B12, and folic acid.  2.  History of seizures a/w Chantix use, in patient with reported history of meningitis- will restart her home Keppra.      I discussed the patient's findings and my recommendations with patient    Mary Mercado MD  07/15/20  11:15

## 2020-07-15 NOTE — PROGRESS NOTES
Discharge Planning Assessment  [facility]     Patient Name: Sierra Saba  MRN: [id number]  Today's Date: 7/15/2020    Admit Date: 7/13/2020    Discharge Needs Assessment     Row Name 07/15/20 1040       Living Environment    Lives With  alone    Current Living Arrangements  home/apartment/condo    Primary Care Provided by  self    Provides Primary Care For  no one    Family Caregiver if Needed  sibling(s)    Family Caregiver Names  Mary Carmen in [place] and Dorota Shin in [place]    Quality of Family Relationships  unable to assess    Able to Return to Prior Arrangements  no       Resource/Environmental Concerns    Resource/Environmental Concerns  none       Transition Planning    Patient/Family Anticipates Transition to  home;home with help/services    Patient/Family Anticipated Services at Transition  none    Transportation Anticipated  family or friend will provide       Discharge Needs Assessment    Readmission Within the Last 30 Days  no previous admission in last 30 days    Concerns to be Addressed  no discharge needs identified;denies needs/concerns at this time    Equipment Currently Used at Home  cane, straight;walker, rolling    Anticipated Changes Related to Illness  none    Equipment Needed After Discharge  none    Provided Post Acute Provider List?  N/A    Patient's Choice of Community Agency(s)  formerly [facility]    Current Discharge Risk  lives alone    Discharge Coordination/Progress  Home with formerly [facility]        Discharge Plan     Row Name 07/15/20 1042       Plan    Plan  Home with formerly [facility]    Patient/Family in Agreement with Plan  yes    Plan Comments  Received a call from Judd Saba, patient son, and discussed discharge planning.  He lives in [place] but reports that she has two sisters that live close, one in [place] and one in [place].  He reports that patient has been in and out of the hospital, usually because she self medicates with valium but is not interested in having her go to rehab because of COVID.   She uses a Straight Cane at home and still drives and only uses her Rolling Walker for long outings.  He came to get her for the 4th of July and took her to [place] for 2 weeks and she seemed to be doing fine until now although he indicates he did notice some balance issues.  Patient does get some of her meals delivered via [employer].  He did not want HH due to COVID concerns but discussed patient needs and he is agreeable to [facility].  No other discharge needs verbalized.  CM following.  Patient plan is to discharge home with [facility] however this plan may evolve into placement.      Final Discharge Disposition Code  06 - home with home health care;03 - skilled nursing facility (SNF)    Row Name 07/15/20 1041       Plan    Plan  Home with [facility]    Patient/Family in Agreement with Plan  yes    Final Discharge Disposition Code  06 - home with home health care        Destination      Coordination has not been started for this encounter.      Durable Medical Equipment      Coordination has not been started for this encounter.      Dialysis/Infusion      Coordination has not been started for this encounter.      Home Medical Care      Service Provider Request Status Selected Services Address Phone Number Fax Number    [facility] CARE Pending - No Request Sent N/A [address] [address] [phone]      Therapy      Coordination has not been started for this encounter.      Community Resources      Coordination has not been started for this encounter.          Demographic Summary     Row Name 07/15/20 1038       General Information    Admission Type  inpatient    Arrived From  home    Referral Source  admission list    Reason for Consult  discharge planning    Preferred Language  English     Used During This Interaction  no    General Information Comments  Praveen Page MD       Contact Information    Permission Granted to Share Info With       Contact Information Obtained for      Contact Information Comments  Judd Saba, son  [phone]        Functional Status     Row Name 07/15/20 1040       Functional Status    Usual Activity Tolerance  moderate    Current Activity Tolerance  poor       Functional Status, IADL    Medications  independent    Meal Preparation  assistive equipment    Housekeeping  assistive equipment    Laundry  assistive equipment    Shopping  assistive equipment       Employment/    Employment/ Comments  Medicare        Psychosocial    No documentation.       Abuse/Neglect    No documentation.       Legal    No documentation.       Substance Abuse    No documentation.       Patient Forms    No documentation.           Irene Pudry RN

## 2020-07-15 NOTE — PLAN OF CARE
Problem: Patient Care Overview  Goal: Plan of Care Review  Outcome: Ongoing (interventions implemented as appropriate)  Flowsheets  Taken 7/15/2020 1628  Progress: no change  Outcome Summary: VSS, complaints of pain alleviated with tylenol. AxO, 2LNC, NSR. Neuro consulted related to recent falls, new medications added. Pt. family requesting to move off floor, MD aware. Continue to monitor.  Taken 7/15/2020 0800  Plan of Care Reviewed With: patient  Goal: Individualization and Mutuality  Outcome: Ongoing (interventions implemented as appropriate)  Goal: Discharge Needs Assessment  Outcome: Ongoing (interventions implemented as appropriate)  Goal: Interprofessional Rounds/Family Conf  Outcome: Ongoing (interventions implemented as appropriate)

## 2020-07-16 LAB
ANION GAP SERPL CALCULATED.3IONS-SCNC: 9 MMOL/L (ref 5–15)
BASOPHILS # BLD AUTO: 0.1 10*3/MM3 (ref 0–0.2)
BASOPHILS NFR BLD AUTO: 1.1 % (ref 0–1.5)
BUN SERPL-MCNC: 11 MG/DL (ref 8–23)
BUN/CREAT SERPL: 19.6 (ref 7–25)
CALCIUM SPEC-SCNC: 9.1 MG/DL (ref 8.6–10.5)
CHLORIDE SERPL-SCNC: 103 MMOL/L (ref 98–107)
CK SERPL-CCNC: 231 U/L (ref 20–180)
CO2 SERPL-SCNC: 26 MMOL/L (ref 22–29)
CREAT SERPL-MCNC: 0.56 MG/DL (ref 0.57–1)
DEPRECATED RDW RBC AUTO: 51.2 FL (ref 37–54)
EOSINOPHIL # BLD AUTO: 0.81 10*3/MM3 (ref 0–0.4)
EOSINOPHIL NFR BLD AUTO: 9.2 % (ref 0.3–6.2)
ERYTHROCYTE [DISTWIDTH] IN BLOOD BY AUTOMATED COUNT: 14 % (ref 12.3–15.4)
FOLATE SERPL-MCNC: 7.46 NG/ML (ref 4.78–24.2)
GFR SERPL CREATININE-BSD FRML MDRD: 105 ML/MIN/1.73
GLUCOSE SERPL-MCNC: 101 MG/DL (ref 65–99)
HCT VFR BLD AUTO: 44.5 % (ref 34–46.6)
HGB BLD-MCNC: 14.5 G/DL (ref 12–15.9)
IMM GRANULOCYTES # BLD AUTO: 0.28 10*3/MM3 (ref 0–0.05)
IMM GRANULOCYTES NFR BLD AUTO: 3.2 % (ref 0–0.5)
LYMPHOCYTES # BLD AUTO: 1.78 10*3/MM3 (ref 0.7–3.1)
LYMPHOCYTES NFR BLD AUTO: 20.2 % (ref 19.6–45.3)
MCH RBC QN AUTO: 31.8 PG (ref 26.6–33)
MCHC RBC AUTO-ENTMCNC: 32.6 G/DL (ref 31.5–35.7)
MCV RBC AUTO: 97.6 FL (ref 79–97)
MONOCYTES # BLD AUTO: 0.76 10*3/MM3 (ref 0.1–0.9)
MONOCYTES NFR BLD AUTO: 8.6 % (ref 5–12)
NEUTROPHILS NFR BLD AUTO: 5.07 10*3/MM3 (ref 1.7–7)
NEUTROPHILS NFR BLD AUTO: 57.7 % (ref 42.7–76)
NRBC BLD AUTO-RTO: 0 /100 WBC (ref 0–0.2)
PLATELET # BLD AUTO: 302 10*3/MM3 (ref 140–450)
PMV BLD AUTO: 9.7 FL (ref 6–12)
POTASSIUM SERPL-SCNC: 4.1 MMOL/L (ref 3.5–5.2)
RBC # BLD AUTO: 4.56 10*6/MM3 (ref 3.77–5.28)
SODIUM SERPL-SCNC: 138 MMOL/L (ref 136–145)
TSH SERPL DL<=0.05 MIU/L-ACNC: 2.07 UIU/ML (ref 0.27–4.2)
VIT B12 BLD-MCNC: 640 PG/ML (ref 211–946)
WBC # BLD AUTO: 8.8 10*3/MM3 (ref 3.4–10.8)

## 2020-07-16 PROCEDURE — 82550 ASSAY OF CK (CPK): CPT | Performed by: FAMILY MEDICINE

## 2020-07-16 PROCEDURE — 80048 BASIC METABOLIC PNL TOTAL CA: CPT | Performed by: FAMILY MEDICINE

## 2020-07-16 PROCEDURE — 84443 ASSAY THYROID STIM HORMONE: CPT | Performed by: CLINICAL NURSE SPECIALIST

## 2020-07-16 PROCEDURE — 99232 SBSQ HOSP IP/OBS MODERATE 35: CPT | Performed by: INTERNAL MEDICINE

## 2020-07-16 PROCEDURE — 97535 SELF CARE MNGMENT TRAINING: CPT

## 2020-07-16 PROCEDURE — 99231 SBSQ HOSP IP/OBS SF/LOW 25: CPT | Performed by: CLINICAL NURSE SPECIALIST

## 2020-07-16 PROCEDURE — 85025 COMPLETE CBC W/AUTO DIFF WBC: CPT | Performed by: FAMILY MEDICINE

## 2020-07-16 RX ADMIN — PAROXETINE HYDROCHLORIDE 20 MG: 20 TABLET, FILM COATED ORAL at 09:30

## 2020-07-16 RX ADMIN — DOCUSATE SODIUM 100 MG: 100 CAPSULE, LIQUID FILLED ORAL at 09:31

## 2020-07-16 RX ADMIN — SODIUM CHLORIDE, PRESERVATIVE FREE 10 ML: 5 INJECTION INTRAVENOUS at 20:16

## 2020-07-16 RX ADMIN — LEVETIRACETAM 500 MG: 500 TABLET ORAL at 20:16

## 2020-07-16 RX ADMIN — SODIUM CHLORIDE, PRESERVATIVE FREE 10 ML: 5 INJECTION INTRAVENOUS at 09:31

## 2020-07-16 RX ADMIN — SODIUM CHLORIDE, POTASSIUM CHLORIDE, SODIUM LACTATE AND CALCIUM CHLORIDE 100 ML/HR: 600; 310; 30; 20 INJECTION, SOLUTION INTRAVENOUS at 09:32

## 2020-07-16 RX ADMIN — DIAZEPAM 5 MG: 5 TABLET ORAL at 20:16

## 2020-07-16 RX ADMIN — POLYETHYLENE GLYCOL 3350 17 G: 17 POWDER, FOR SOLUTION ORAL at 20:16

## 2020-07-16 RX ADMIN — LEVETIRACETAM 500 MG: 500 TABLET ORAL at 09:30

## 2020-07-16 NOTE — PLAN OF CARE
Problem: Patient Care Overview  Goal: Plan of Care Review  Outcome: Ongoing (interventions implemented as appropriate)  Flowsheets  Taken 7/16/2020 1648  Progress: improving  Outcome Summary: PT with no new complaints. VSS, weaned to room air without issue. CKI improved to 231. PT hopes to go home with son tomorrow. A+O all day.  Taken 7/16/2020 1600  Plan of Care Reviewed With: patient

## 2020-07-16 NOTE — PROGRESS NOTES
[facility] Medicine Services  PROGRESS NOTE    Patient Name: Sierra Saba  : 1940  MRN: [id number]    Date of Admission: 2020  Primary Care Physician: Praveen Page MD    Subjective   Subjective     CC:  F/u cough and fall     HPI:  Feels ok today, denies pain from recent fall.    Review of Systems  Gen- No fevers, chills  CV- No chest pain, palpitations  Resp- No cough, dyspnea  GI- No N/V/D, abd pain          Objective   Objective     Vital Signs:   Temp:  [96.8 °F (36 °C)-97.7 °F (36.5 °C)] 97.7 °F (36.5 °C)  Heart Rate:  [] 90  Resp:  [16-19] 18  BP: (153-179)/() 179/100        Physical Exam:  Constitutional -no acute distress, non toxic, up in chair  HEENT-NCAT, mucous membranes moist  CV-RRR, S1 S2 normal, no m/r/g  Resp-CTAB, no wheezes, rhonchi or rales  Abd-soft, non-tender, non-distended, normo active bowel sounds  Ext-No lower extremity cyanosis, clubbing or edema bilaterally  Neuro-alert and oriented, speech clear, moves all extremities   Psych-normal affect   Skin- No rash on exposed UE or LE bilaterally      Results Reviewed:  Results from last 7 days   Lab Units 20  0449 07/15/20  07520  0627   WBC 10*3/mm3 8.80 9.19 8.59   HEMOGLOBIN g/dL 14.5 14.6 14.4   HEMATOCRIT % 44.5 44.8 42.0   PLATELETS 10*3/mm3 302 265 246     Results from last 7 days   Lab Units 20  0449 07/15/20  0750 07/15/20  0053 20  0627 20  1427   SODIUM mmol/L 138 136  --  132* 129*   POTASSIUM mmol/L 4.1 4.1 4.1 3.0* 3.8   CHLORIDE mmol/L 103 100  --  95* 88*   CO2 mmol/L 26.0 25.0  --  27.0 23.0   BUN mg/dL 11 14  --  31* 55*   CREATININE mg/dL 0.56* 0.57  --  0.74 1.51*   GLUCOSE mg/dL 101* 82  --  98 126*   CALCIUM mg/dL 9.1 9.1  --  8.7 9.8   ALT (SGPT) U/L  --   --   --  29 42*   AST (SGOT) U/L  --   --   --  37* 71*   TROPONIN T ng/mL  --   --   --   --  <0.010   PROBNP pg/mL  --   --   --   --  1,009.0     Estimated Creatinine Clearance:  56 mL/min (A) (by C-G formula based on SCr of 0.56 mg/dL (L)).    Microbiology Results Abnormal     Procedure Component Value - Date/Time    COVID-19,[facility] IN-HOUSE, NP SWAB IN TRANSPORT MEDIA 8-12 HR TAT - Swab, Nasopharynx [[id number]]  (Normal) Collected:  07/13/20 1915    Lab Status:  Final result Specimen:  Swab from Nasopharynx Updated:  07/13/20 2341     COVID19 Not Detected    Narrative:       Fact sheet for providers: https://www.fda.gov/media/635813/download     Fact sheet for patients: https://www.fda.gov/media/435196/download          Imaging Results (Last 24 Hours)     ** No results found for the last 24 hours. **               I have reviewed the medications:  Scheduled Meds:    levETIRAcetam 500 mg Oral Q12H   PARoxetine 20 mg Oral Daily   polyethylene glycol 17 g Oral Nightly   sodium chloride 10 mL Intravenous Q12H     Continuous Infusions:    lactated ringers 100 mL/hr Last Rate: 100 mL/hr (07/16/20 0932)     PRN Meds:.•  acetaminophen  •  diazePAM  •  docusate sodium  •  potassium chloride **OR** potassium chloride **OR** potassium chloride  •  sodium chloride  •  sodium chloride    Assessment/Plan   Assessment & Plan     Active Hospital Problems    Diagnosis  POA   • **Non-traumatic rhabdomyolysis [M62.82]  Yes   • Cough [R05]  Yes   • Fall [W19.XXXA]  Yes   • Acute kidney injury (CMS/HCC) [N17.9]  Yes   • Essential hypertension [I10]  Yes      Resolved Hospital Problems   No resolved problems to display.        Brief Hospital Course to date:  Sierra Saba is a 79 y.o. female with PMH of HTN that was admitted with cough and history of fall.     Rhabdomyolysis  Recent Fall  AMS Vs Dementia   -CK trending down with fluids ck 1320 down to 231  -UA does not indicate infection   -Neurology following. She has had a recent admission with seizure due to benzo withdrawal. Continue home Keppra  Hyponatremia-improving  -resolved  JORDANA  -resolved  COVID rule out with recent travel   -COVID negative   HTN  [facility]  planning  - at this juncture patient and family planning to defer rehab and return home at discharge. Likely dc home in am (Son expected to stay with patient for the next couple of weeks)    DVT Prophylaxis:  Mechanical       Disposition: I expect the patient to be discharged TBD.    CODE STATUS:   Code Status and Medical Interventions:   Ordered at: 07/13/20 1855     Level Of Support Discussed With:    Patient     Code Status:    CPR     Medical Interventions (Level of Support Prior to Arrest):    Full         Electronically signed by Bairon Heredia MD, 07/16/20, 15:54.

## 2020-07-16 NOTE — PROGRESS NOTES
"Neurology       Patient Care Team:  Praveen Page MD as PCP - General  Praveen Page MD as PCP - Family Medicine    Chief complaint AMS      Subjective .     History:  Patient lying in bed, rested well last pm.  Denies any new episodes of altered mental status or confusion. She tells me her son is coming in from [place] today to help her get home health set up for therapy.     ROS: Denies chest pain, shortness of breath, does have constipation, mental status improved, alert and oriented.     Objective     Vital Signs   Blood pressure 153/87, pulse 101, temperature 97.4 °F (36.3 °C), temperature source Oral, resp. rate 16, height 165.1 cm (65\"), weight 61.2 kg (134 lb 14.4 oz), SpO2 98 %.    Physical Exam:              Neuro: Alert to person, place, and time. Fund of knowledge is adequate. Speech is normal. Moves all extremities without weakness. Sensation intact. EOM, tongue midline.     Results Review:                            Folate-7.46              TSH-2.070              CBC/BMP basically normal               CK-improving to 231    Assessment/Plan       Non-traumatic rhabdomyolysis    Cough    Fall    Acute kidney injury (CMS/HCC)    Essential hypertension        1.  AMS-this appears to have been transient.  Given her age, she is certainly vulnerable to hospital-induced delirium.  No evidence of seizure or stroke.  No current evidence of incipient dementia although history not sufficient to entirely rule this out.  I have encouraged her to follow up with outpatient neurology in memory care if new issues or concerns with memory arise. Her B12, folate, and TSH are normal.  Her CK is improving.   2.  History of seizures a/w Chantix use, in patient with reported history of meningitis- will continue her home Keppra.       No further neurologic work needed, will sign off.  Call with any new concerns.    I discussed the patients findings and my recommendations with patient    Lesley BLAND  Debbie, " APRN  07/16/20  10:45

## 2020-07-16 NOTE — PLAN OF CARE
Problem: Patient Care Overview  Goal: Plan of Care Review  Flowsheets (Taken 7/16/2020 1530)  Progress: improving  Plan of Care Reviewed With: patient  Outcome Summary: Doff/don socks: SUP. Toileting Tasks: CGA w/grab bar and RW.  Functional Transfer & In Room Mobility: CGA w/RW.  Limited by strength, endurance, balance, pain, medical.  Recommend IRF @ d/c.  Will cont to observe and address ADL/IADL deficits as needed.

## 2020-07-16 NOTE — PROGRESS NOTES
[facility] Medicine Services  PROGRESS NOTE    Patient Name: Sierra Saba  : 1940  MRN: [id number]    Date of Admission: 2020  Primary Care Physician: Praveen Page MD    Subjective   Subjective     CC:  F/u cough and fall     HPI:  Patient is resting in bed. She has no complaints.     Review of Systems  Gen- No fevers, chills  CV- No chest pain, palpitations  Resp- No cough, dyspnea  GI- No N/V/D, abd pain        Objective   Objective     Vital Signs:   Temp:  [96.3 °F (35.7 °C)-97.6 °F (36.4 °C)] 97.6 °F (36.4 °C)  Heart Rate:  [] 108  Resp:  [19-20] 19  BP: (140-167)/(75-90) 167/75        Physical Exam:  Constitutional: No acute distress, awake, alert, elderly female   HENT: NCAT, mucous membranes moist  Respiratory: Clear to auscultation bilaterally, respiratory effort normal   Cardiovascular: RRR, no murmurs, rubs, or gallops  Gastrointestinal: Positive bowel sounds, soft, nontender, nondistended  Musculoskeletal: No bilateral ankle edema  Psychiatric: cooperative, slightly agitated   Neurologic: Oriented x 3, strength symmetric in all extremities, Cranial Nerves grossly intact to confrontation, speech clear  Skin: No rashes      Results Reviewed:  Results from last 7 days   Lab Units 07/15/20  0750 20  0620  1427   WBC 10*3/mm3 9.19 8.59 10.41   HEMOGLOBIN g/dL 14.6 14.4 16.1*   HEMATOCRIT % 44.8 42.0 46.8*   PLATELETS 10*3/mm3 265 246 356     Results from last 7 days   Lab Units 07/15/20  0750 07/15/20  0053 20  0620  1427   SODIUM mmol/L 136  --  132* 129*   POTASSIUM mmol/L 4.1 4.1 3.0* 3.8   CHLORIDE mmol/L 100  --  95* 88*   CO2 mmol/L 25.0  --  27.0 23.0   BUN mg/dL 14  --  31* 55*   CREATININE mg/dL 0.57  --  0.74 1.51*   GLUCOSE mg/dL 82  --  98 126*   CALCIUM mg/dL 9.1  --  8.7 9.8   ALT (SGPT) U/L  --   --  29 42*   AST (SGOT) U/L  --   --  37* 71*   TROPONIN T ng/mL  --   --   --  <0.010   PROBNP pg/mL  --   --   --   1,009.0     Estimated Creatinine Clearance: 56 mL/min (by C-G formula based on SCr of 0.57 mg/dL).    Microbiology Results Abnormal     Procedure Component Value - Date/Time    COVID-19,[facility] IN-HOUSE, NP SWAB IN TRANSPORT MEDIA 8-12 HR TAT - Swab, Nasopharynx [[id number]]  (Normal) Collected:  07/13/20 1915    Lab Status:  Final result Specimen:  Swab from Nasopharynx Updated:  07/13/20 2341     COVID19 Not Detected    Narrative:       Fact sheet for providers: https://www.fda.gov/media/397461/download     Fact sheet for patients: https://www.fda.gov/media/490463/download          Imaging Results (Last 24 Hours)     ** No results found for the last 24 hours. **               I have reviewed the medications:  Scheduled Meds:    levETIRAcetam 500 mg Oral Q12H   [START ON 7/16/2020] PARoxetine 20 mg Oral Daily   sodium chloride 10 mL Intravenous Q12H     Continuous Infusions:    lactated ringers 100 mL/hr Last Rate: 100 mL/hr (07/15/20 1631)     PRN Meds:.•  acetaminophen  •  diazePAM  •  docusate sodium  •  potassium chloride **OR** potassium chloride **OR** potassium chloride  •  sodium chloride  •  sodium chloride    Assessment/Plan   Assessment & Plan     Active Hospital Problems    Diagnosis  POA   • **Non-traumatic rhabdomyolysis [M62.82]  Yes   • Cough [R05]  Yes   • Fall [W19.XXXA]  Yes   • Acute kidney injury (CMS/HCC) [N17.9]  Yes   • Essential hypertension [I10]  Yes      Resolved Hospital Problems   No resolved problems to display.        Brief Hospital Course to date:  Sierra Saba is a 79 y.o. female with PMH of HTN that was admitted with cough and history of fall.     Rhabdomyolysis  Recent Fall  AMS Vs Dementia   -CK trending down with fluids ck 1320 down to 420  -UA does not indicate infection   -Neurology following, apparantly Dr Whaley knows her. She has had a recent admission with seizure due to benzo withdrawal   Hyponatremia-improving  -na 129 up to 136  JORDANA  -improving cr 057   COVID rule out  with recent travel   -COVID negative   HTN  GOC planning  Today I had a long conversation with the patient's son. He agreed to have her placed in rehab but her worries she will not be willing to go. He lives in [place] and will work on more long term plan after she is discharged.     DVT Prophylaxis:  Mechanical     Daily Care Communication  Due to current limited visitation policies, an attempt will be made daily to update patient's identified best point-of-contact(s)   Contact: Judd Saba    Relation: son    Type of communication (phone or televideo): phone   Time of communication:    Notes (if applicable): he was happy with the update      Disposition: I expect the patient to be discharged TBD.    CODE STATUS:   Code Status and Medical Interventions:   Ordered at: 07/13/20 1851     Level Of Support Discussed With:    Patient     Code Status:    CPR     Medical Interventions (Level of Support Prior to Arrest):    Full         Electronically signed by Katya Herron DO, 07/15/20, 22:38.

## 2020-07-16 NOTE — THERAPY TREATMENT NOTE
Acute Care - Occupational Therapy Treatment Note  [facility]     Patient Name: Sierra Saba  : 1940  MRN: [id number]  Today's Date: 2020             Admit Date: 2020       ICD-10-CM ICD-9-CM   1. Non-traumatic rhabdomyolysis M62.82 728.88   2. JORDANA (acute kidney injury) (CMS/HCC) N17.9 584.9   3. Injury of head, initial encounter S09.90XA 959.01   4. Acute back pain, unspecified back location, unspecified back pain laterality M54.9 724.5   5. Back spasm M62.830 724.8   6. Contusion of back, unspecified laterality, initial encounter S20.229A 922.31   7. Fall, initial encounter W19.XXXA E888.9   8. Cough R05 786.2   9. Pulmonary emphysema, unspecified emphysema type (CMS/HCC) J43.9 492.8   10. Impaired mobility and ADLs Z74.09 V49.89    Z78.9      Patient Active Problem List   Diagnosis   • Non-traumatic rhabdomyolysis   • Cough   • Fall   • Acute kidney injury (CMS/HCC)   • Essential hypertension     Past Medical History:   Diagnosis Date   • Hypertension      Past Surgical History:   Procedure Laterality Date   • BACK SURGERY     • CHOLECYSTECTOMY     • HYSTERECTOMY     • TOTAL HIP ARTHROPLASTY         Therapy Treatment    Rehabilitation Treatment Summary     Row Name 20 1501             Treatment Time/Intention    Discipline  occupational therapist  -KA      Document Type  therapy note (daily note)  -KA      Subjective Information  no complaints  -KA      Mode of Treatment  occupational therapy  -KA      Therapy Frequency (OT Eval)  daily  -KA      Patient Effort  excellent  -KA      Existing Precautions/Restrictions  fall  -KA      Recorded by [KA] Genevieve Henriquez OT 20 1630      Row Name 20 1501             Vital Signs    Post Systolic BP Rehab  166  -KA      Post Treatment Diastolic BP  81  -KA      Posttreatment Heart Rate (beats/min)  106  -KA      Post SpO2 (%)  94  -KA      O2 Delivery Post Treatment  room air  -KA      Pre Patient Position  Sitting  -KA      Intra  Patient Position  Standing  -KA      Post Patient Position  Sitting  -KA      Recorded by [KA] Genevieve Henriquez, OT 07/16/20 1630      Row Name 07/16/20 1501             Functional Mobility    Functional Mobility- Ind. Level  contact guard assist;verbal cues required  -KA      Functional Mobility- Device  rolling walker  -KA      Functional Mobility-Distance (Feet)  30  -KA      Functional Mobility- Safety Issues  step length decreased;weight-shifting ability decreased  -KA      Recorded by [KA] Genevieve Henriquez OT 07/16/20 1630      Row Name 07/16/20 1501             Transfer Assessment/Treatment    Transfer Assessment/Treatment  sit-stand transfer;stand-sit transfer  -KA      Recorded by [KA] Genevieve Henriquez OT 07/16/20 1630      Row Name 07/16/20 1501             Sit-Stand Transfer    Sit-Stand [place] (Transfers)  contact guard  -KA      Assistive Device (Sit-Stand Transfers)  walker, front-wheeled  -KA      Recorded by [KA] Genevieve Henriquez OT 07/16/20 1630      Row Name 07/16/20 1501             Stand-Sit Transfer    Stand-Sit [place] (Transfers)  contact guard  -KA      Assistive Device (Stand-Sit Transfers)  walker, front-wheeled  -KA      Recorded by [KA] Genevieve Henriquez OT 07/16/20 1630      Row Name 07/16/20 1501             Toilet Transfer    Type (Toilet Transfer)  sit-stand;stand-sit  -KA      [place] Level (Toilet Transfer)  contact guard  -KA      Assistive Device (Toilet Transfer)  commode;grab bars/safety frame  -KA      Recorded by [KA] Genevieve Henriquez OT 07/16/20 1630      Row Name 07/16/20 1501             ADL Assessment/Intervention    52304 - OT Self Care/Mgmt Minutes  30  -KA      BADL Assessment/Intervention  lower body dressing;toileting  -KA      Recorded by [KA] Genevieve Henriquez OT 07/16/20 1630      Row Name 07/16/20 1501             Lower Body Dressing Assessment/Training    Lower Body Dressing [place] Level  doff;don;socks;supervision  -KA      Lower  Body Dressing Position  unsupported sitting  -KA      Recorded by [KA] Genevieve Henriquez OT 07/16/20 1630      Row Name 07/16/20 1501             Toileting Assessment/Training    [place] Level (Toileting)  adjust/manage clothing;change pad/brief;perform perineal hygiene;supervision  -KA      Assistive Devices (Toileting)  commode;grab bar/safety frame  -KA      Toileting Position  unsupported sitting;supported standing  -KA      Recorded by [KA] Genevieve Henriquez OT 07/16/20 1630      Row Name 07/16/20 1501             Positioning and Restraints    Pre-Treatment Position  sitting in chair/recliner  -KA      Post Treatment Position  bed  -KA      In Bed  fowlers;call light within reach;encouraged to call for assist;exit alarm on;side rails up x2  -KA      Recorded by [KA] Genevieve Henriquez OT 07/16/20 1630      Row Name 07/16/20 1501             Pain Assessment    Additional Documentation  Pain Scale: FACES Pre/Post-Treatment (Group)  -KA      Recorded by [KA] Genevieve Henriquez OT 07/16/20 1630      Row Name 07/16/20 1501             Pain Scale: FACES Pre/Post-Treatment    Pain: FACES Scale, Pretreatment  0-->no hurt  -KA      Pain: FACES Scale, Post-Treatment  0-->no hurt  -KA      Pre/Post Treatment Pain Comment  c/o L ankle pain, tolerated well.   -KA      Recorded by [KA] Genevieve Henriquez OT 07/16/20 1630      Row Name 07/16/20 1501             Plan of Care Review    Plan of Care Reviewed With  patient  -KA      Progress  improving  -KA      Recorded by [KA] Genevieve Henriquez OT 07/16/20 1630      Row Name 07/16/20 1501             Outcome Summary/Treatment Plan (OT)    Daily Summary of Progress (OT)  progress toward functional goals is good  -KA      Anticipated Discharge Disposition (OT)  inpatient rehabilitation facility  -KA      Recorded by [KA] Genevieve Henriquez OT 07/16/20 1630        User Key  (r) = Recorded By, (t) = Taken By, (c) = Cosigned By    Initials Name Effective Dates Discipline     Genevieve Emery, OT 07/17/19 -  OT             Occupational Therapy Education                 Title: PT OT SLP Therapies (In Progress)     Topic: Occupational Therapy (In Progress)     Point: ADL training (Done)     Description:   Instruct learner(s) on proper safety adaptation and remediation techniques during self care or transfers.   Instruct in proper use of assistive devices.              Learning Progress Summary           Patient Acceptance, E,D, VU,DU by JESSICA at 7/16/2020 1501    Comment:  Pt educated on role of OT, safety, fall prevention, ADLs, transfers, and POC.    Acceptance, E,D, NR by JOSE ELIAS at 7/14/2020 0845    Comment:  reason for consult, noted deficits, goals setting, transfer safety, recommend rehab                   Point: Home exercise program (Not Started)     Description:   Instruct learner(s) on appropriate technique for monitoring, assisting and/or progressing therapeutic exercises/activities.              Learner Progress:   Not documented in this visit.          Point: Precautions (Done)     Description:   Instruct learner(s) on prescribed precautions during self-care and functional transfers.              Learning Progress Summary           Patient Acceptance, E,D, VU,DU by JESSICA at 7/16/2020 1501    Comment:  Pt educated on role of OT, safety, fall prevention, ADLs, transfers, and POC.    Acceptance, E,D, NR by JOSE ELIAS at 7/14/2020 0845    Comment:  reason for consult, noted deficits, goals setting, transfer safety, recommend rehab                   Point: Body mechanics (Done)     Description:   Instruct learner(s) on proper positioning and spine alignment during self-care, functional mobility activities and/or exercises.              Learning Progress Summary           Patient Acceptance, E,D, VU,DU by JESSICA at 7/16/2020 1501    Comment:  Pt educated on role of OT, safety, fall prevention, ADLs, transfers, and POC.                               User Key     Initials Effective Dates Name Provider  Type Discipline     06/08/18 -  Alaina Martinez OT Occupational Therapist OT    JESSICA 07/17/19 -  Genevieve Henriquez OT Occupational Therapist OT                OT Recommendation and Plan  Outcome Summary/Treatment Plan (OT)  Daily Summary of Progress (OT): progress toward functional goals is good  Anticipated Discharge Disposition (OT): inpatient rehabilitation facility  Therapy Frequency (OT Eval): daily  Daily Summary of Progress (OT): progress toward functional goals is good  Plan of Care Review  Plan of Care Reviewed With: patient  Plan of Care Reviewed With: patient  Outcome Summary: Doff/don socks: SUP. Toileting Tasks: CGA w/grab bar and RW.  Functional Transfer & In Room Mobility: CG aw/RW.  Limited by strength, endurance, balance, pain, medical.  Recommend IRF @ d/c.  Will cont to observe and address ADL/IADL deficits as needed.  Outcome Measures     Row Name 07/16/20 1501 07/14/20 0845          How much help from another is currently needed...    Putting on and taking off regular lower body clothing?  3  -KA  2  -JOSE ELIAS     Bathing (including washing, rinsing, and drying)  2  -KA  3  -JOSE ELIAS     Toileting (which includes using toilet bed pan or urinal)  3  -KA  3  -JOSE ELIAS     Putting on and taking off regular upper body clothing  3  -KA  2  -JOSE ELIAS     Taking care of personal grooming (such as brushing teeth)  3  -KA  3  -JOSE ELIAS     Eating meals  4  -KA  4  -JOSE ELIAS     AM-PAC 6 Clicks Score (OT)  18  -KA  17  -JOSE ELIAS        Functional Assessment    Outcome Measure Options  --  AM-PAC 6 Clicks Daily Activity (OT)  -JOSE ELIAS       User Key  (r) = Recorded By, (t) = Taken By, (c) = Cosigned By    Initials Name Provider Type    JOSE ELIAS Alaina Martinez, OT Occupational Therapist    Genevieve Emery OT Occupational Therapist           Time Calculation:   Time Calculation- OT     Row Name 07/16/20 1530 07/16/20 1501          Time Calculation- OT    OT Start Time  1501  -KA  --     OT Received On  07/16/20  -KA  --     OT Goal Re-Cert Due Date   07/24/20  -JESSICA  --        Timed Charges    18076 - OT Self Care/Mgmt Minutes  --  30  -KA       User Key  (r) = Recorded By, (t) = Taken By, (c) = Cosigned By    Initials Name Provider Type    Genevieve Emery OT Occupational Therapist        Therapy Charges for Today     Code Description Service Date Service Provider Modifiers Qty    [id number] HC OT SELF CARE/MGMT/TRAIN EA 15 MIN 7/16/2020 Genevieve Henriquez OT GO 2               Genevieve Henriquez OT  7/16/2020

## 2020-07-16 NOTE — PLAN OF CARE
Patient on 2L nasal canula while sleeping, otherwise while awake on room air. C/o pain one time during shift, controlled with PRN Tylenol. No other complaints at this time. Will continue to monitor.

## 2020-07-16 NOTE — PROGRESS NOTES
Continued Stay Note  [facility]     Patient Name: Sierra Saba  MRN: [id number]  Today's Date: 7/16/2020    Admit Date: 7/13/2020    Discharge Plan     Row Name 07/16/20 1145       Plan    Plan  update    Patient/Family in Agreement with Plan  yes    Plan Comments  Per voicemail from MD yesterday, CM to discuss rehab with patient son.  Called patients son who advises that he is in the airport now and is flying to [place] to stay with his mother for a few weeks and refuses SNF due to COVID concerns.  CM advised that she needed rehab per PT recs however he was adamant that he wished to take her to her own home and try to get her back on her feet because she was living alone and doing well 6 days ago when her brought her back from [place] and left her at home.  He does want  and is agreeable to [facility].  MD notified of sons wishes.  CM following.  Patient plan is to discharge home with [facility] via car with family to transport.      Final Discharge Disposition Code  06 - home with home health care        Discharge Codes    No documentation.             Irene Purdy, RN

## 2020-07-17 VITALS
SYSTOLIC BLOOD PRESSURE: 152 MMHG | BODY MASS INDEX: 22.48 KG/M2 | WEIGHT: 134.9 LBS | HEIGHT: 65 IN | TEMPERATURE: 97.6 F | OXYGEN SATURATION: 99 % | RESPIRATION RATE: 18 BRPM | DIASTOLIC BLOOD PRESSURE: 74 MMHG | HEART RATE: 94 BPM

## 2020-07-17 PROCEDURE — 99239 HOSP IP/OBS DSCHRG MGMT >30: CPT | Performed by: NURSE PRACTITIONER

## 2020-07-17 PROCEDURE — 97530 THERAPEUTIC ACTIVITIES: CPT

## 2020-07-17 PROCEDURE — 97116 GAIT TRAINING THERAPY: CPT

## 2020-07-17 RX ADMIN — SODIUM CHLORIDE, POTASSIUM CHLORIDE, SODIUM LACTATE AND CALCIUM CHLORIDE 100 ML/HR: 600; 310; 30; 20 INJECTION, SOLUTION INTRAVENOUS at 08:31

## 2020-07-17 RX ADMIN — SODIUM CHLORIDE, PRESERVATIVE FREE 10 ML: 5 INJECTION INTRAVENOUS at 08:32

## 2020-07-17 RX ADMIN — PAROXETINE HYDROCHLORIDE 20 MG: 20 TABLET, FILM COATED ORAL at 08:31

## 2020-07-17 RX ADMIN — LEVETIRACETAM 500 MG: 500 TABLET ORAL at 08:31

## 2020-07-17 NOTE — THERAPY TREATMENT NOTE
Patient Name: Sierra Saba  : 1940    MRN: [id number]                              Today's Date: 2020       Admit Date: 2020    Visit Dx:     ICD-10-CM ICD-9-CM   1. Non-traumatic rhabdomyolysis M62.82 728.88   2. JORDANA (acute kidney injury) (CMS/[facility]) N17.9 584.9   3. Injury of head, initial encounter S09.90XA 959.01   4. Acute back pain, unspecified back location, unspecified back pain laterality M54.9 724.5   5. Back spasm M62.830 724.8   6. Contusion of back, unspecified laterality, initial encounter S20.229A 922.31   7. Fall, initial encounter W19.XXXA E888.9   8. Cough R05 786.2   9. Pulmonary emphysema, unspecified emphysema type (CMS/[facility]) J43.9 492.8   10. Impaired mobility and ADLs Z74.09 V49.89    Z78.9      Patient Active Problem List   Diagnosis   • Non-traumatic rhabdomyolysis   • Cough   • Fall   • Acute kidney injury (CMS/[facility])   • Essential hypertension     Past Medical History:   Diagnosis Date   • Hypertension      Past Surgical History:   Procedure Laterality Date   • BACK SURGERY     • CHOLECYSTECTOMY     • HYSTERECTOMY     • TOTAL HIP ARTHROPLASTY       General Information     Row Name 20 1035          PT Evaluation Time/Intention    Document Type  therapy note (daily note)  -MP     Mode of Treatment  physical therapy  -MP     Row Name 20 1035          General Information    Existing Precautions/Restrictions  fall  -MP     Row Name 20 1035          Cognitive Assessment/Intervention- PT/OT    Orientation Status (Cognition)  oriented x 3  -MP     Row Name 20 1035          Safety Issues, Functional Mobility    Impairments Affecting Function (Mobility)  balance;endurance/activity tolerance;strength  -MP       User Key  (r) = Recorded By, (t) = Taken By, (c) = Cosigned By    Initials Name Provider Type    MP Ollie De La Rosa PT Physical Therapist        Mobility     Row Name 20 1035          Bed Mobility Assessment/Treatment    Comment (Bed Mobility)  In  bathroom with nsg upon arrival.  -MP     Row Name 07/17/20 1035          Transfer Assessment/Treatment    Comment (Transfers)  Patient performed toilet transfer with min.ax1 and required assist with pericare this date.  -MP     Row Name 07/17/20 1035          Sit-Stand Transfer    Sit-Stand [place] (Transfers)  minimum assist (75% patient effort);1 person assist  -MP     Assistive Device (Sit-Stand Transfers)  walker, front-wheeled  -MP     Row Name 07/17/20 1035          Gait/Stairs Assessment/Training    Gait/Stairs Assessment/Training  gait/ambulation assistive device  -MP     [place] Level (Gait)  1 person assist;1 person to manage equipment;contact guard  -MP     Assistive Device (Gait)  walker, front-wheeled  -MP     Distance in Feet (Gait)  60'  -MP     Pattern (Gait)  step-through  -MP     Deviations/Abnormal Patterns (Gait)  lisa decreased;gait speed decreased;stride length decreased  -MP     Bilateral Gait Deviations  heel strike decreased  -MP     Comment (Gait/Stairs)  Patient ambulated 60' with RW, CGA with chair follow. Patient required VC's for positioning of AD in relation to body. Fwd ambulation distance limited by fatigue.   -MP       User Key  (r) = Recorded By, (t) = Taken By, (c) = Cosigned By    Initials Name Provider Type    Ollie Lopez PT Physical Therapist        Obj/Interventions     Row Name 07/17/20 1037          Therapeutic Exercise    Lower Extremity (Therapeutic Exercise)  marching while seated;LAQ (long arc quad), bilateral;gluteal sets  -MP     Lower Extremity Range of Motion (Therapeutic Exercise)  ankle dorsiflexion/plantar flexion, bilateral  -MP     Exercise Type (Therapeutic Exercise)  AROM (active range of motion)  -MP     Position (Therapeutic Exercise)  seated  -MP     Sets/Reps (Therapeutic Exercise)  1x10  -MP     Row Name 07/17/20 1037          Static Sitting Balance    Level of [place] (Unsupported Sitting, Static Balance)  supervision  -MP      Sitting Position (Unsupported Sitting, Static Balance)  sitting in chair;other (see comments) toileting  -MP     Time Able to Maintain Position (Unsupported Sitting, Static Balance)  more than 5 minutes  -MP     Row Name 07/17/20 1037          Static Standing Balance    Level of [place] (Supported Standing, Static Balance)  contact guard assist;1 person assist  -MP     Assistive Device Utilized (Supported Standing, Static Balance)  walker, rolling  -MP     Row Name 07/17/20 1037          Dynamic Standing Balance    Level of [place], Reaches Outside Midline (Standing, Dynamic Balance)  contact guard assist;1 person assist  -MP     Assistive Device Utilized (Supported Standing, Dynamic Balance)  walker, rolling  -MP       User Key  (r) = Recorded By, (t) = Taken By, (c) = Cosigned By    Initials Name Provider Type    Ollie Lopez PT Physical Therapist        Goals/Plan    No documentation.       Clinical Impression     Row Name 07/17/20 1038          Pain Assessment    Additional Documentation  Pain Scale: FACES Pre/Post-Treatment (Group)  -MP     Row Name 07/17/20 1038          Pain Scale: FACES Pre/Post-Treatment    Pain: FACES Scale, Pretreatment  0-->no hurt  -MP     Pain: FACES Scale, Post-Treatment  0-->no hurt  -MP     [facility] Name 07/17/20 1038          Vital Signs    Pre Systolic BP Rehab  -- VSS; RN cleared for PTx  -MP     O2 Delivery Pre Treatment  room air  -MP     O2 Delivery Intra Treatment  room air  -MP     O2 Delivery Post Treatment  room air  -MP     Pre Patient Position  Sitting  -MP     Intra Patient Position  Standing  -MP     Post Patient Position  Sitting  -MP     Row Name 07/17/20 1038          Positioning and Restraints    Pre-Treatment Position  bathroom  -MP     Post Treatment Position  chair  -MP     In Chair  reclined;call light within reach;encouraged to call for assist;exit alarm on;waffle cushion;legs elevated;heels elevated  -MP       User Key  (r) = Recorded By, (t) =  Taken By, (c) = Cosigned By    Initials Name Provider Type    Ollie Lopez PT Physical Therapist        Outcome Measures     Row Name 07/17/20 1039          How much help from another person do you currently need...    Turning from your back to your side while in flat bed without using bedrails?  3  -MP     Moving from lying on back to sitting on the side of a flat bed without bedrails?  3  -MP     Moving to and from a bed to a chair (including a wheelchair)?  3  -MP     Standing up from a chair using your arms (e.g., wheelchair, bedside chair)?  3  -MP     Climbing 3-5 steps with a railing?  2  -MP     To walk in hospital room?  3  -MP     AM-PAC 6 Clicks Score (PT)  17  -MP     Row Name 07/17/20 1039          Functional Assessment    Outcome Measure Options  AM-PAC 6 Clicks Basic Mobility (PT)  -MP       User Key  (r) = Recorded By, (t) = Taken By, (c) = Cosigned By    Initials Name Provider Type    Ollie Lopez PT Physical Therapist        Physical Therapy Education                 Title: PT OT SLP Therapies (In Progress)     Topic: Physical Therapy (Done)     Point: Mobility training (Done)     Description:   Instruct learner(s) on safety and technique for assisting patient out of bed, chair or wheelchair.  Instruct in the proper use of assistive devices, such as walker, crutches, cane or brace.              Patient Friendly Description:   It's important to get you on your feet again, but we need to do so in a way that is safe for you. Falling has serious consequences, and your personal safety is the most important thing of all.        When it's time to get out of bed, one of us or a family member will sit next to you on the bed to give you support.     If your doctor or nurse tells you to use a walker, crutches, a cane, or a brace, be sure you use it every time you get out of bed, even if you think you don't need it.    Learning Progress Summary           Patient Acceptance, E,D, VU,NR by NIRALI at  7/17/2020 1040    Acceptance, E,D, NR by  at 7/14/2020 0927                   Point: Home exercise program (Done)     Description:   Instruct learner(s) on appropriate technique for monitoring, assisting and/or progressing patient with therapeutic exercises and activities.              Learning Progress Summary           Patient Acceptance, E,D, VU,NR by  at 7/17/2020 1040                   Point: Body mechanics (Done)     Description:   Instruct learner(s) on proper positioning and spine alignment for patient and/or caregiver during mobility tasks and/or exercises.              Learning Progress Summary           Patient Acceptance, E,D, VU,NR by  at 7/17/2020 1040    Acceptance, E,D, NR by  at 7/14/2020 0927                   Point: Precautions (Done)     Description:   Instruct learner(s) on prescribed precautions during mobility and gait tasks              Learning Progress Summary           Patient Acceptance, E,D, VU,NR by  at 7/17/2020 1040    Acceptance, E,D, NR by  at 7/14/2020 0927                               User Key     Initials Effective Dates Name Provider Type Discipline     11/06/19 -  Ollie De La Rosa, PT Physical Therapist PT              PT Recommendation and Plan  Planned Therapy Interventions (PT Eval): balance training, bed mobility training, gait training, home exercise program, patient/family education, strengthening, transfer training  Outcome Summary/Treatment Plan (PT)  Anticipated Discharge Disposition (PT): inpatient rehabilitation facility  Plan of Care Reviewed With: patient  Progress: improving  Outcome Summary: Patient performed toileting during session and required assist with pericare. Patient ambulated 60' with RW, CGA with chair follow. Patient required VC's for positioning of AD in relation to body. Fwd ambulation distance limited by fatigue. Patient gave good effort with seated ther ex. Cont PT POC.     Time Calculation:   PT Charges     Row Name 07/17/20 0944              Time Calculation    Start Time  0944  -MP      PT Received On  07/17/20  -MP         Time Calculation- PT    Total Timed Code Minutes- PT  23 minute(s)  -MP         Timed Charges    57107 - PT Therapeutic Exercise Minutes  3  -MP      60467 - Gait Training Minutes   10  -MP      20607 - PT Therapeutic Activity Minutes  10  -MP        User Key  (r) = Recorded By, (t) = Taken By, (c) = Cosigned By    Initials Name Provider Type    Ollie Lopez PT Physical Therapist        Therapy Charges for Today     Code Description Service Date Service Provider Modifiers Qty    [id number] HC GAIT TRAINING EA 15 MIN 7/17/2020 Ollie De La Rosa, PT GP 1    [id number]  PT THERAPEUTIC ACT EA 15 MIN 7/17/2020 Ollie De La Rosa, PT GP 1    [id number]  PT THER SUPP EA 15 MIN 7/17/2020 Ollie De La Rosa, PT GP 2          PT G-Codes  Outcome Measure Options: AM-PAC 6 Clicks Basic Mobility (PT)  AM-PAC 6 Clicks Score (PT): 17  AM-PAC 6 Clicks Score (OT): 18    Ollie De La Rosa PT  7/17/2020

## 2020-07-17 NOTE — DISCHARGE INSTR - APPOINTMENTS
You have an appointment with Praveen Page MD  on July 22, 2020 @ 11:00 AM.   Call them if you have any questions. Phone: [phone] [address]  [place] [id number]

## 2020-07-17 NOTE — DISCHARGE SUMMARY
[facility] Medicine Services  DISCHARGE SUMMARY    Patient Name: Sierra Saba  : 1940  MRN: [id number]    Date of Admission: 2020  1:40 PM  Date of Discharge:  20  Primary Care Physician: Praveen Page MD    Consults     Date and Time Order Name Status Description    7/15/2020 0030 Inpatient Neurology Consult General Completed           Hospital Course     Presenting Problem:   Non-traumatic rhabdomyolysis [M62.82]    Active Hospital Problems    Diagnosis  POA   • **Non-traumatic rhabdomyolysis [M62.82]  Yes   • Cough [R05]  Yes   • Fall [W19.XXXA]  Yes   • Acute kidney injury (CMS/HCC) [N17.9]  Yes   • Essential hypertension [I10]  Yes      Resolved Hospital Problems   No resolved problems to display.          Hospital Course:  Sierra Saba is a 79 y.o. female  with PMHx significant for HTN that was admitted to [facility] on 2020 due to cough and history of fall. The patient reports approximately 1 week ago she fell in her home, and could not get up from the ground. She had to crawl to get to her telephone and laid on the ground all night and urinated on herself.     On arrival to the ER the patient's heart rate was elevated at 120.  Her blood pressure was normal 108/57 and a oxygen saturation of 93 percent on room air.  Her initial CMP revealed a elevated creatinine of 1.51 with a sodium level of 129.  She had a mild elevated AST and ALT with a normal bilirubin.  Her anion gap is also elevated 18.  Her urinalysis was remarkable for small amount of blood, 3-6 RBCs.  Her troponin and magnesium level, troponin and BNP were normal.  Her CBC showed WBC 10.41, H/H 16.1/46.8, platelet 356.  CK level was elevated 1320.  She had xray of bilateral ankles and knees which did not reveal any acute trauma.  Chest x-ray was normal.  She underwent CT scan of the chest showed advanced changes of central lobar emphysema.  Lung appears to be clear of active disease.  No pleural  effusion.  No chest wall hematoma.  Bony structures appear grossly normal.  CT abdomen pelvis which revealed no evidence of acute intra-abdominal or intrapelvic pathology and a nonobstructing right nephrolithiasis incidentally noted. CT head was done which revealed stable head CT no evidence of acute trauma or other intracranial disease.     CK has continued to trend down following IV fluids from 1320 on admission to 231 yesterday. Urinalysis did not indicate infection. Neurology was consulted due to AMS and recent admission due to benzo withdrawal seizure. Neurology recommended as needed follow up for memory care issues as outpatient with no further neurologic workup needed at this time. Therapy recommended inpatient rehab but due to concerns of COVID-19 she has decided to return home at discharge. Her son will be staying with her for a few weeks to assist with care. [facility] with PT/OT has been arranged by case management. She is currently stable and is appropriate for discharge.     Rhabdomyolysis  Recent Fall  AMS Vs Dementia   -CK trending down with fluids ck 1320 down to 231  -UA does not indicate infection   -Neurology following. She had a recent admission with seizure due to benzo withdrawal.   -Continue home Keppra    Hyponatremia-improving  -resolved    JORDANA  -resolved  -holding home ACE for now    COVID rule out with recent travel   -COVID negative     HTN  GO planning  - at this juncture patient and family planning to defer rehab and return home at discharge. Son plans to stay with mother for a couple weeks. Will discharge home today.     Discharge Follow Up Recommendations for outpatient labs/diagnostics:   Follow up with PCP in 1 week.  Follow up Neurology as needed for memory care concerns.     Day of Discharge     HPI:   Seen resting in recliner in no apparent distress. No acute events overnight per nursing. She rested well overnight. Feels much better today and wants to go home. She  denies any pain. We discussed the importance of taking all medications as prescribed and keeping all recommended follow ups. No complaints at this time.     Review of Systems  Gen- No fevers, chills  CV- No chest pain, palpitations  Resp- No cough, dyspnea  GI- No N/V/D, abd pain    Vital Signs:   Temp:  [97.2 °F (36.2 °C)-97.8 °F (36.6 °C)] 97.6 °F (36.4 °C)  Heart Rate:  [] 94  Resp:  [18] 18  BP: (152-179)/() 152/74     Physical Exam:  Constitutional: No acute distress, awake, alert  HENT: NCAT, mucous membranes moist  Respiratory: Clear to auscultation bilaterally, respiratory effort normal   Cardiovascular: RRR, no murmurs, palpable pedal pulses bilaterally, cap refill brisk   Gastrointestinal: Positive bowel sounds, soft, nontender, nondistended  Musculoskeletal: No BLE edema   Psychiatric: Appropriate affect, cooperative  Neurologic: Oriented x 3, moves all extremities, speech clear  Skin: warm, dry, no visible rash     Pertinent  and/or Most Recent Results     Results from last 7 days   Lab Units 07/16/20  0449 07/15/20  0750 07/15/20  0053 07/14/20  0627 07/13/20  1427   WBC 10*3/mm3 8.80 9.19  --  8.59 10.41   HEMOGLOBIN g/dL 14.5 14.6  --  14.4 16.1*   HEMATOCRIT % 44.5 44.8  --  42.0 46.8*   PLATELETS 10*3/mm3 302 265  --  246 356   SODIUM mmol/L 138 136  --  132* 129*   POTASSIUM mmol/L 4.1 4.1 4.1 3.0* 3.8   CHLORIDE mmol/L 103 100  --  95* 88*   CO2 mmol/L 26.0 25.0  --  27.0 23.0   BUN mg/dL 11 14  --  31* 55*   CREATININE mg/dL 0.56* 0.57  --  0.74 1.51*   GLUCOSE mg/dL 101* 82  --  98 126*   CALCIUM mg/dL 9.1 9.1  --  8.7 9.8     Results from last 7 days   Lab Units 07/14/20  0627 07/13/20  1427   BILIRUBIN mg/dL 0.8 0.7   ALK PHOS U/L 42 50   ALT (SGPT) U/L 29 42*   AST (SGOT) U/L 37* 71*           Invalid input(s): TG, LDLCALC, LDLREALC  Results from last 7 days   Lab Units 07/16/20  0449 07/13/20  1427   TSH uIU/mL 2.070  --    PROBNP pg/mL  --  1,009.0   TROPONIN T ng/mL  --  <0.010        Brief Urine Lab Results  (Last result in the past 365 days)      Color   Clarity   Blood   Leuk Est   Nitrite   Protein   CREAT   Urine HCG        07/13/20 1714 Yellow Clear Small (1+) Negative Negative Trace               Microbiology Results Abnormal     Procedure Component Value - Date/Time    COVID-19,[facility] IN-HOUSE, NP SWAB IN TRANSPORT MEDIA 8-12 HR TAT - Swab, Nasopharynx [[id number]]  (Normal) Collected:  07/13/20 1915    Lab Status:  Final result Specimen:  Swab from Nasopharynx Updated:  07/13/20 2341     COVID19 Not Detected    Narrative:       Fact sheet for providers: https://www.fda.gov/media/310310/download     Fact sheet for patients: https://www.fda.gov/media/361734/download          Imaging Results (All)     Procedure Component Value Units Date/Time    CT Chest Without Contrast [[id number]] Collected:  07/13/20 1625     Updated:  07/14/20 2159    Narrative:       EXAMINATION: CT CHEST WO CONTRAST-, CT ABDOMEN AND PELVIS WO CONTRAST-      INDICATION: Fall, cough, mid back pain.      TECHNIQUE: 5 mm unenhanced images through the chest, abdomen and pelvis.     The radiation dose reduction device was turned on for each scan per the  ALARA (As Low as Reasonably Achievable) protocol.     COMPARISON: No recent comparison studies. Chest CT scan from 03/23/2012.     FINDINGS: History indicates fall, mid back pain, cough.     CHEST CT SCAN WITHOUT CONTRAST: There are advanced changes of  centrilobular emphysema. Lungs appear clear of active disease. There is  no pleural effusion. No chest wall hematoma is seen. Bony structures  appear grossly intact. Mediastinal window images show no evidence of  mediastinal hematoma, adenopathy or significant pericardial effusion.       Impression:       Advanced changes of centrilobular emphysema. No evidence of  active chest disease.     ABDOMEN AND PELVIS CT SCAN WITHOUT CONTRAST: Clips are seen in the  gallbladder fossa. No significant abnormalities are seen of the  liver,  spleen, pancreas, adrenal glands, or left kidney. There are several  small right renal calculi. There is no evidence of obstructive uropathy.  No upper abdominal free air, ascites, adenopathy or acute inflammatory  focus is appreciated.     Regarding the lower abdomen and pelvis, bowel loops are normal in  caliber and normal in appearance. No mass, adenopathy, ascites or acute  inflammatory change is seen. There is streak artifact from patient's  right hip prosthesis partially obscures the lower pelvis. There is mild  streak artifact from the patient's lumbar spine fusion.  The bladder  appears normally distended and grossly normal.     Patient history indicates mid back pain. No evidence of acute thoracic  or lumbar compression deformity is seen.     IMPRESSION:   1. No evidence of acute intraabdominal or intrapelvic pathology is seen.  2. Nonobstructing right nephrolithiasis incidentally noted.     D:  07/13/2020  E:  07/14/2020     This report was finalized on 7/14/2020 9:56 PM by Dr. Jaylen Nunez MD.       CT Abdomen Pelvis Without Contrast [[id number]] Collected:  07/13/20 1625     Updated:  07/14/20 2159    Narrative:       EXAMINATION: CT CHEST WO CONTRAST-, CT ABDOMEN AND PELVIS WO CONTRAST-      INDICATION: Fall, cough, mid back pain.      TECHNIQUE: 5 mm unenhanced images through the chest, abdomen and pelvis.     The radiation dose reduction device was turned on for each scan per the  ALARA (As Low as Reasonably Achievable) protocol.     COMPARISON: No recent comparison studies. Chest CT scan from 03/23/2012.     FINDINGS: History indicates fall, mid back pain, cough.     CHEST CT SCAN WITHOUT CONTRAST: There are advanced changes of  centrilobular emphysema. Lungs appear clear of active disease. There is  no pleural effusion. No chest wall hematoma is seen. Bony structures  appear grossly intact. Mediastinal window images show no evidence of  mediastinal hematoma, adenopathy or significant  pericardial effusion.       Impression:       Advanced changes of centrilobular emphysema. No evidence of  active chest disease.     ABDOMEN AND PELVIS CT SCAN WITHOUT CONTRAST: Clips are seen in the  gallbladder fossa. No significant abnormalities are seen of the liver,  spleen, pancreas, adrenal glands, or left kidney. There are several  small right renal calculi. There is no evidence of obstructive uropathy.  No upper abdominal free air, ascites, adenopathy or acute inflammatory  focus is appreciated.     Regarding the lower abdomen and pelvis, bowel loops are normal in  caliber and normal in appearance. No mass, adenopathy, ascites or acute  inflammatory change is seen. There is streak artifact from patient's  right hip prosthesis partially obscures the lower pelvis. There is mild  streak artifact from the patient's lumbar spine fusion.  The bladder  appears normally distended and grossly normal.     Patient history indicates mid back pain. No evidence of acute thoracic  or lumbar compression deformity is seen.     IMPRESSION:   1. No evidence of acute intraabdominal or intrapelvic pathology is seen.  2. Nonobstructing right nephrolithiasis incidentally noted.     D:  07/13/2020  E:  07/14/2020     This report was finalized on 7/14/2020 9:56 PM by Dr. Jaylen Nunez MD.       CT Head Without Contrast [[id number]] Collected:  07/13/20 1615     Updated:  07/13/20 2210    Narrative:       EXAMINATION: CT HEAD WO CONTRAST- 07/13/2020     INDICATION: fall     TECHNIQUE: 5 mm unenhanced images through the brain     The radiation dose reduction device was turned on for each scan per the  ALARA (As Low as Reasonably Achievable) protocol.     COMPARISON: 07/30/2014 head CT scan     FINDINGS: The calvarium appears intact. Included paranasal sinuses and  mastoids appear clear. Soft tissue images show expected degree of  generalized cerebral atrophy for age. Chronic appearing white matter  changes, particularly in the right  corona radiata and centrum semiovale  appear stable. There is no evidence of hemorrhage contusion or edema,  mass, mass effect hydrocephalus, or infarct. Prominence of the bifrontal  CSF spaces is similar to prior study and consistent with expansion of  the CSF space due to generalized brain atrophy.       Impression:       Stable head CT scan with no evidence of acute trauma or  other acute intracranial disease.     D:  07/13/2020  E:  07/13/2020     This report was finalized on 7/13/2020 10:06 PM by Dr. Jaylen Nunez MD.       XR Ankle 3+ View Bilateral [[id number]] Collected:  07/13/20 1559     Updated:  07/13/20 2147    Narrative:       EXAMINATION: XR ANKLE 3+ VW BILATERAL- 07/13/2020     INDICATION: fall     COMPARISON: NONE     FINDINGS: Patient history indicates fall today with bilateral ankle  pain.     Right ankle: Bones of the right ankle joint appear anatomically aligned  and intact. No fracture avulsion or foreign body is seen.       Impression:       No visible right ankle fracture.     Left ankle: Bones of the left ankle joint appear anatomically aligned  and intact. No fracture avulsion or foreign body is seen.     IMPRESSION: No visible left ankle fracture.     D:  07/13/2020  E:  07/13/2020         This report was finalized on 7/13/2020 9:44 PM by Dr. Jaylen Nunez MD.       XR Chest 1 View [[id number]] Collected:  07/13/20 1410     Updated:  07/13/20 2144    Narrative:       EXAMINATION: XR CHEST 1 VW- 07/13/2020     INDICATION: shortness of breath      COMPARISON: [id number]     FINDINGS: Heart, mediastinum and pulmonary vasculature appear within  normal limits.  The lungs appear hyperexpanded and clear except for mild  chronic appearing interstitial changes. No edema effusion or  pneumothorax is seen.       Impression:       No evidence of active chest disease.     D:  07/13/2020  E:  07/13/2020         This report was finalized on 7/13/2020 9:41 PM by Dr. Jaylen Nunez MD.       XR Knee 1 or 2 View  Bilateral [[id number]] Collected:  07/13/20 1601     Updated:  07/13/20 2140    Narrative:       EXAMINATION: XR KNEE 1 OR 2 VW BILATERAL- 07/13/2020     INDICATION: fall     COMPARISON: NONE     FINDINGS: Right knee: There is advanced lateral compartment DJD and  milder degenerative change elsewhere. No fracture, foreign body or plain  film evidence of knee joint effusion is seen.       Impression:       No evidence of acute right knee trauma. Advanced lateral  compartment DJD noted.     Left knee: Left knee joint appears anatomically aligned and intact. No  fracture, avulsion or significant left knee joint effusion is seen.  There is moderate lateral compartment DJD.     IMPRESSION: Left knee DJD but no evidence of acute trauma.     D:  07/13/2020  E:  07/13/2020         This report was finalized on 7/13/2020 9:37 PM by Dr. Jaylen Nunez MD.             Plan for Follow-up of Pending Labs/Results: Follow up with PCP in 1 week.     Discharge Details        Discharge Medications      Continue These Medications      Instructions Start Date   acetaminophen 325 MG tablet  Commonly known as:  TYLENOL   650 mg, Oral, Every 4 Hours PRN      Keppra 500 MG tablet  Generic drug:  levETIRAcetam   500 mg, Oral, Every 12 Hours Scheduled      Paxil 20 MG tablet  Generic drug:  PARoxetine   20 mg, Oral, Daily      Valium 5 MG tablet  Generic drug:  diazePAM   5 mg, Oral, 3 Times Daily PRN         Stop These Medications    lisinopril 5 MG tablet  Commonly known as:  PRINIVIL,ZESTRIL            Allergies   Allergen Reactions   • Penicillins Rash         Discharge Disposition:  Home or Self Care    Diet:  Hospital:  Diet Order   Procedures   • Diet Regular       Activity:  Activity Instructions     Up WIth Assist               CODE STATUS:    Code Status and Medical Interventions:   Ordered at: 07/13/20 1851     Level Of Support Discussed With:    Patient     Code Status:    CPR     Medical Interventions (Level of Support Prior to  Arrest):    Full       No future appointments.    Additional Instructions for the Follow-ups that You Need to Schedule     Ambulatory Referral to Home Health   As directed      Face to Face Visit Date:  7/16/2020    Follow-up provider for Plan of Care?:  I treated the patient in an acute care facility and will not continue treatment after discharge.    Follow-up provider:  LUCY ABDI [[id number]]    Reason/Clinical Findings:  non trausmatic rhabdomyolysis    Describe mobility limitations that make leaving home difficult:  weakness, confusion, impaired physical mobility, impaired functional mobility    Nursing/Therapeutic Services Requested:  Skilled Nursing Physical Therapy Occupational Therapy    Skilled nursing orders:  Cardiopulmonary assessments Medication education    PT orders:  Therapeutic exercise Gait Training Transfer training Strengthening Home safety assessment    Weight Bearing Status:  As Tolerated    Occupational orders:  Activities of daily living Energy conservation Strengthening Home safety assessment         Discharge Follow-up with PCP   As directed       Currently Documented PCP:    Lucy Abdi MD    PCP Phone Number:    [phone]     Follow Up Details:  Follow up with PCP in 1 week.         Discharge Follow-up with Specialty: Neurology   As directed      Specialty:  Neurology    Follow Up Details:  As needed for memory care issues                     Electronically signed by SARKIS Lua, 07/17/20, 10:27 AM.      Time Spent on Discharge:  I spent  37  minutes on this discharge activity which included: face-to-face encounter with the patient, reviewing the data in the system, coordination of the care with the nursing staff as well as consultants, documentation, and entering orders.

## 2020-07-17 NOTE — PLAN OF CARE
Problem: Patient Care Overview  Goal: Plan of Care Review  7/17/2020 1042 by Ollie De La Rosa, PT  Outcome: Ongoing (interventions implemented as appropriate)  Flowsheets (Taken 7/17/2020 1042)  Progress: improving  Plan of Care Reviewed With: patient  Outcome Summary: Patient performed toileting during session and required assist with pericare. Patient ambulated 60' with RW, CGA with chair follow. Patient required VC's for positioning of AD in relation to body. Fwd ambulation distance limited by fatigue. Patient gave good effort with seated ther ex. Cont PT POC.

## 2020-07-17 NOTE — PROGRESS NOTES
Case Management Discharge Note      Final Note: Per MD rounds, patient to discharge home today with her son.  Spoke with patient at bedside regarding discharge plan who is agreeable to plan for [facility].  No other discharge needs verbalized.  Called patients son, Judd, and left a message with callback number.  Patient plan is to discharge home with [facility] today via car with family to transport.      Provided Post Acute Provider List?: N/A    Destination      No service has been selected for the patient.      Durable Medical Equipment      No service has been selected for the patient.      Dialysis/Infusion      No service has been selected for the patient.      Home Medical Care - Selection Complete      Service Provider Request Status Selected Services Address Phone Number Fax Number    [facility] Selected Home Health Services [address] [address] [phone]      Therapy      No service has been selected for the patient.      Community Resources      No service has been selected for the patient.             Final Discharge Disposition Code: 06 - home with home health care

## 2020-07-17 NOTE — PLAN OF CARE
Problem: Patient Care Overview  Goal: Plan of Care Review  Flowsheets (Taken 7/17/2020 0427)  Progress: improving  Plan of Care Reviewed With: patient  Outcome Summary: PT rested throughout the night.  Denies any concerns or complaints.

## 2020-07-18 ENCOUNTER — READMISSION MANAGEMENT (OUTPATIENT)
Dept: CALL CENTER | Facility: HOSPITAL | Age: 80
End: 2020-07-18

## 2020-07-18 NOTE — OUTREACH NOTE
Prep Survey      Responses   [facility] patient discharged from?  [place]   Is LACE score < 7 ?  No   Eligibility  Readm Mgmt   Discharge diagnosis  Non-traumatic rhabdomyolysis    COVID-19 Test Status  Negative   Does the patient have one of the following disease processes/diagnoses(primary or secondary)?  Other   Does the patient have Home health ordered?  Yes   What is the Home health agency?   [facility]   Is there a DME ordered?  No   Prep survey completed?  Yes          Margaret Babin RN

## 2020-07-21 ENCOUNTER — READMISSION MANAGEMENT (OUTPATIENT)
Dept: CALL CENTER | Facility: HOSPITAL | Age: 80
End: 2020-07-21

## 2020-07-21 NOTE — OUTREACH NOTE
Medical Week 1 Survey      Responses   [facility] patient discharged from?  [place]   COVID-19 Test Status  Negative   Does the patient have one of the following disease processes/diagnoses(primary or secondary)?  Other   Is there a successful TCM telephone encounter documented?  No   Week 1 attempt successful?  Yes   Call start time  1016   Call end time  1020   Discharge diagnosis  Non-traumatic rhabdomyolysis    Meds reviewed with patient/caregiver?  Yes   Is the patient having any side effects they believe may be caused by any medication additions or changes?  No   Does the patient have all medications ordered at discharge?  Yes   Is the patient taking all medications as directed (includes completed medication regime)?  Yes   Does the patient have a primary care provider?   Yes   Does the patient have an appointment with their PCP within 7 days of discharge?  Yes   Has the patient kept scheduled appointments due by today?  Yes   What is the Home health agency?   [facility]   Has home health visited the patient within 72 hours of discharge?  Yes   Psychosocial issues?  No   Did the patient receive a copy of their discharge instructions?  Yes   Nursing interventions  Reviewed instructions with patient   What is the patient's perception of their health status since discharge?  Same   Is the patient/caregiver able to teach back signs and symptoms related to disease process for when to call PCP?  Yes   Is the patient/caregiver able to teach back signs and symptoms related to disease process for when to call 911?  Yes   Is the patient/caregiver able to teach back the hierarchy of who to call/visit for symptoms/problems? PCP, Specialist, Home health nurse, Urgent Care, ED, 911  Yes   Week 1 call completed?  Yes   Wrap up additional comments  pt stated feet are swelling, but she iselevating them and will see her PCP in morning.She was also in a car for awhile yesterday.           Margaret Babin RN

## 2020-07-28 ENCOUNTER — READMISSION MANAGEMENT (OUTPATIENT)
Dept: CALL CENTER | Facility: HOSPITAL | Age: 80
End: 2020-07-28

## 2020-07-28 NOTE — OUTREACH NOTE
Medical Week 2 Survey      Responses   [facility] patient discharged from?  [place]   Does the patient have one of the following disease processes/diagnoses(primary or secondary)?  Other   Week 2 attempt successful?  Yes   Call start time  1449   Call end time  1452   Medication alerts for this patient  took off bp meds and replace with Norvac   Meds reviewed with patient/caregiver?  Yes   Is the patient taking all medications as directed (includes completed medication regime)?  Yes   Comments regarding appointments  has seen    Has the patient kept scheduled appointments due by today?  Yes   What is the Home health agency?   [facility]   Has home health visited the patient within 72 hours of discharge?  Yes   What DME was ordered?  walker and bath tub, cane   Pulse Ox monitoring  None   Psychosocial issues?  No   Did the patient receive a copy of their discharge instructions?  Yes   Nursing interventions  Reviewed instructions with patient   What is the patient's perception of their health status since discharge?  Improving   Week 2 Call Completed?  Yes   Wrap up additional comments  stated is feeling fine          Nina Sullivan RN

## 2020-08-27 ENCOUNTER — OUTSIDE FACILITY SERVICE (OUTPATIENT)
Dept: HOSPITALIST | Facility: HOSPITAL | Age: 80
End: 2020-08-27

## 2020-08-27 PROCEDURE — G0180 MD CERTIFICATION HHA PATIENT: HCPCS | Performed by: INTERNAL MEDICINE

## 2021-01-01 ENCOUNTER — APPOINTMENT (OUTPATIENT)
Dept: PREADMISSION TESTING | Facility: HOSPITAL | Age: 81
End: 2021-01-01

## 2021-01-01 ENCOUNTER — HOSPITAL ENCOUNTER (OUTPATIENT)
Dept: GENERAL RADIOLOGY | Facility: HOSPITAL | Age: 81
Discharge: HOME | End: 2021-11-17
Admitting: INTERNAL MEDICINE

## 2021-01-01 ENCOUNTER — HOSPITAL ENCOUNTER (INPATIENT)
Facility: HOSPITAL | Age: 81
LOS: 7 days | Discharge: NURSING, REHAB OR LONG-TERM CARE | End: 2021-10-28
Attending: EMERGENCY MEDICINE | Admitting: STUDENT IN AN ORGANIZED HEALTH CARE EDUCATION/TRAINING PROGRAM

## 2021-01-01 ENCOUNTER — APPOINTMENT (OUTPATIENT)
Dept: GENERAL RADIOLOGY | Facility: HOSPITAL | Age: 81
End: 2021-01-01

## 2021-01-01 ENCOUNTER — APPOINTMENT (OUTPATIENT)
Dept: CT IMAGING | Facility: HOSPITAL | Age: 81
End: 2021-01-01

## 2021-01-01 ENCOUNTER — TRANSCRIBE ORDERS (OUTPATIENT)
Dept: ADMINISTRATIVE | Facility: HOSPITAL | Age: 81
End: 2021-01-01

## 2021-01-01 ENCOUNTER — HOSPITAL ENCOUNTER (OUTPATIENT)
Dept: MRI IMAGING | Facility: HOSPITAL | Age: 81
Discharge: HOME | End: 2021-08-16
Admitting: INTERNAL MEDICINE

## 2021-01-01 ENCOUNTER — HOSPITAL ENCOUNTER (OUTPATIENT)
Dept: GENERAL RADIOLOGY | Facility: HOSPITAL | Age: 81
Discharge: HOME | End: 2021-07-20
Admitting: INTERNAL MEDICINE

## 2021-01-01 ENCOUNTER — HOSPITAL ENCOUNTER (EMERGENCY)
Facility: HOSPITAL | Age: 81
Discharge: HOME | End: 2021-07-12
Attending: EMERGENCY MEDICINE | Admitting: EMERGENCY MEDICINE

## 2021-01-01 VITALS
HEIGHT: 65 IN | WEIGHT: 135 LBS | RESPIRATION RATE: 20 BRPM | OXYGEN SATURATION: 97 % | HEART RATE: 100 BPM | DIASTOLIC BLOOD PRESSURE: 73 MMHG | BODY MASS INDEX: 22.49 KG/M2 | SYSTOLIC BLOOD PRESSURE: 120 MMHG | TEMPERATURE: 97.6 F

## 2021-01-01 VITALS
OXYGEN SATURATION: 98 % | BODY MASS INDEX: 22.49 KG/M2 | SYSTOLIC BLOOD PRESSURE: 147 MMHG | HEART RATE: 90 BPM | RESPIRATION RATE: 18 BRPM | TEMPERATURE: 98.6 F | HEIGHT: 65 IN | DIASTOLIC BLOOD PRESSURE: 97 MMHG | WEIGHT: 135 LBS

## 2021-01-01 DIAGNOSIS — M54.31 BILATERAL SCIATICA: Primary | ICD-10-CM

## 2021-01-01 DIAGNOSIS — Z87.891 PAST USE OF TOBACCO: ICD-10-CM

## 2021-01-01 DIAGNOSIS — M54.32 BILATERAL SCIATICA: Primary | ICD-10-CM

## 2021-01-01 DIAGNOSIS — J01.90 ACUTE SINUSITIS, RECURRENCE NOT SPECIFIED, UNSPECIFIED LOCATION: Primary | ICD-10-CM

## 2021-01-01 DIAGNOSIS — R06.02 SHORTNESS OF BREATH: ICD-10-CM

## 2021-01-01 DIAGNOSIS — M54.32 BILATERAL SCIATICA: ICD-10-CM

## 2021-01-01 DIAGNOSIS — J43.9 PULMONARY EMPHYSEMA, UNSPECIFIED EMPHYSEMA TYPE (HCC): ICD-10-CM

## 2021-01-01 DIAGNOSIS — Z20.822 ENCOUNTER FOR PREOPERATIVE SCREENING LABORATORY TESTING FOR COVID-19 VIRUS: ICD-10-CM

## 2021-01-01 DIAGNOSIS — R05.9 COUGH: ICD-10-CM

## 2021-01-01 DIAGNOSIS — M54.32 LEFT SIDED SCIATICA: Primary | ICD-10-CM

## 2021-01-01 DIAGNOSIS — J69.0 ASPIRATION PNEUMONIA OF RIGHT LOWER LOBE, UNSPECIFIED ASPIRATION PNEUMONIA TYPE (HCC): ICD-10-CM

## 2021-01-01 DIAGNOSIS — J93.9 PNEUMOTHORAX ON LEFT: Primary | ICD-10-CM

## 2021-01-01 DIAGNOSIS — Z01.812 ENCOUNTER FOR PREOPERATIVE SCREENING LABORATORY TESTING FOR COVID-19 VIRUS: ICD-10-CM

## 2021-01-01 DIAGNOSIS — M54.31 BILATERAL SCIATICA: ICD-10-CM

## 2021-01-01 DIAGNOSIS — F41.1 GENERALIZED ANXIETY DISORDER: ICD-10-CM

## 2021-01-01 DIAGNOSIS — I10 ELEVATED BLOOD PRESSURE READING WITH DIAGNOSIS OF HYPERTENSION: ICD-10-CM

## 2021-01-01 LAB
ALBUMIN SERPL-MCNC: 3.5 G/DL (ref 3.5–5.2)
ALBUMIN SERPL-MCNC: 4.4 G/DL (ref 3.5–5.2)
ALBUMIN SERPL-MCNC: 4.6 G/DL (ref 3.5–5.2)
ALBUMIN/GLOB SERPL: 1.3 G/DL
ALBUMIN/GLOB SERPL: 1.5 G/DL
ALBUMIN/GLOB SERPL: 1.5 G/DL
ALP SERPL-CCNC: 63 U/L (ref 39–117)
ALP SERPL-CCNC: 69 U/L (ref 39–117)
ALP SERPL-CCNC: 82 U/L (ref 39–117)
ALT SERPL W P-5'-P-CCNC: 13 U/L (ref 1–33)
ALT SERPL W P-5'-P-CCNC: 18 U/L (ref 1–33)
ALT SERPL W P-5'-P-CCNC: 21 U/L (ref 1–33)
ANION GAP SERPL CALCULATED.3IONS-SCNC: 10 MMOL/L (ref 5–15)
ANION GAP SERPL CALCULATED.3IONS-SCNC: 13 MMOL/L (ref 5–15)
ANION GAP SERPL CALCULATED.3IONS-SCNC: 15 MMOL/L (ref 5–15)
ANION GAP SERPL CALCULATED.3IONS-SCNC: 15 MMOL/L (ref 5–15)
ANION GAP SERPL CALCULATED.3IONS-SCNC: 8 MMOL/L (ref 5–15)
ANION GAP SERPL CALCULATED.3IONS-SCNC: 9 MMOL/L (ref 5–15)
ARTERIAL PATENCY WRIST A: ABNORMAL
AST SERPL-CCNC: 14 U/L (ref 1–32)
AST SERPL-CCNC: 24 U/L (ref 1–32)
AST SERPL-CCNC: 27 U/L (ref 1–32)
ATMOSPHERIC PRESS: ABNORMAL MM[HG]
BASE EXCESS BLDA CALC-SCNC: 3 MMOL/L (ref 0–2)
BASOPHILS # BLD AUTO: 0.01 10*3/MM3 (ref 0–0.2)
BASOPHILS # BLD AUTO: 0.06 10*3/MM3 (ref 0–0.2)
BASOPHILS # BLD AUTO: 0.08 10*3/MM3 (ref 0–0.2)
BASOPHILS NFR BLD AUTO: 0.1 % (ref 0–1.5)
BASOPHILS NFR BLD AUTO: 0.9 % (ref 0–1.5)
BASOPHILS NFR BLD AUTO: 0.9 % (ref 0–1.5)
BDY SITE: ABNORMAL
BILIRUB SERPL-MCNC: 0.2 MG/DL (ref 0–1.2)
BILIRUB SERPL-MCNC: 0.4 MG/DL (ref 0–1.2)
BILIRUB SERPL-MCNC: 0.6 MG/DL (ref 0–1.2)
BODY TEMPERATURE: 37 C
BUN SERPL-MCNC: 18 MG/DL (ref 8–23)
BUN SERPL-MCNC: 19 MG/DL (ref 8–23)
BUN SERPL-MCNC: 22 MG/DL (ref 8–23)
BUN SERPL-MCNC: 23 MG/DL (ref 8–23)
BUN SERPL-MCNC: 24 MG/DL (ref 8–23)
BUN SERPL-MCNC: 25 MG/DL (ref 8–23)
BUN/CREAT SERPL: 20.7 (ref 7–25)
BUN/CREAT SERPL: 30.6 (ref 7–25)
BUN/CREAT SERPL: 32.4 (ref 7–25)
BUN/CREAT SERPL: 35.8 (ref 7–25)
BUN/CREAT SERPL: 39.7 (ref 7–25)
BUN/CREAT SERPL: 41.1 (ref 7–25)
CALCIUM SPEC-SCNC: 10 MG/DL (ref 8.6–10.5)
CALCIUM SPEC-SCNC: 8.8 MG/DL (ref 8.6–10.5)
CALCIUM SPEC-SCNC: 9 MG/DL (ref 8.6–10.5)
CALCIUM SPEC-SCNC: 9.3 MG/DL (ref 8.6–10.5)
CALCIUM SPEC-SCNC: 9.4 MG/DL (ref 8.6–10.5)
CALCIUM SPEC-SCNC: 9.9 MG/DL (ref 8.6–10.5)
CHLORIDE SERPL-SCNC: 103 MMOL/L (ref 98–107)
CHLORIDE SERPL-SCNC: 104 MMOL/L (ref 98–107)
CHLORIDE SERPL-SCNC: 99 MMOL/L (ref 98–107)
CHLORIDE SERPL-SCNC: 99 MMOL/L (ref 98–107)
CO2 BLDA-SCNC: 31.6 MMOL/L (ref 22–33)
CO2 SERPL-SCNC: 22 MMOL/L (ref 22–29)
CO2 SERPL-SCNC: 23 MMOL/L (ref 22–29)
CO2 SERPL-SCNC: 23 MMOL/L (ref 22–29)
CO2 SERPL-SCNC: 26 MMOL/L (ref 22–29)
COHGB MFR BLD: 0.5 % (ref 0–2)
CREAT SERPL-MCNC: 0.56 MG/DL (ref 0.57–1)
CREAT SERPL-MCNC: 0.62 MG/DL (ref 0.57–1)
CREAT SERPL-MCNC: 0.63 MG/DL (ref 0.57–1)
CREAT SERPL-MCNC: 0.67 MG/DL (ref 0.57–1)
CREAT SERPL-MCNC: 0.68 MG/DL (ref 0.57–1)
CREAT SERPL-MCNC: 0.87 MG/DL (ref 0.57–1)
CRP SERPL-MCNC: <0.3 MG/DL (ref 0–0.5)
DEPRECATED RDW RBC AUTO: 45.1 FL (ref 37–54)
DEPRECATED RDW RBC AUTO: 45.5 FL (ref 37–54)
DEPRECATED RDW RBC AUTO: 45.8 FL (ref 37–54)
DEPRECATED RDW RBC AUTO: 47.1 FL (ref 37–54)
DEPRECATED RDW RBC AUTO: 48.4 FL (ref 37–54)
DEPRECATED RDW RBC AUTO: 50.4 FL (ref 37–54)
EOSINOPHIL # BLD AUTO: 0 10*3/MM3 (ref 0–0.4)
EOSINOPHIL # BLD AUTO: 0.32 10*3/MM3 (ref 0–0.4)
EOSINOPHIL # BLD AUTO: 0.41 10*3/MM3 (ref 0–0.4)
EOSINOPHIL NFR BLD AUTO: 0 % (ref 0.3–6.2)
EOSINOPHIL NFR BLD AUTO: 3.6 % (ref 0.3–6.2)
EOSINOPHIL NFR BLD AUTO: 6 % (ref 0.3–6.2)
EPAP: 0
ERYTHROCYTE [DISTWIDTH] IN BLOOD BY AUTOMATED COUNT: 11.9 % (ref 12.3–15.4)
ERYTHROCYTE [DISTWIDTH] IN BLOOD BY AUTOMATED COUNT: 11.9 % (ref 12.3–15.4)
ERYTHROCYTE [DISTWIDTH] IN BLOOD BY AUTOMATED COUNT: 12.1 % (ref 12.3–15.4)
ERYTHROCYTE [DISTWIDTH] IN BLOOD BY AUTOMATED COUNT: 13.7 % (ref 12.3–15.4)
ERYTHROCYTE [SEDIMENTATION RATE] IN BLOOD: 13 MM/HR (ref 0–30)
FLUAV RNA RESP QL NAA+PROBE: NOT DETECTED
FLUBV RNA RESP QL NAA+PROBE: NOT DETECTED
FOLATE SERPL-MCNC: 5 NG/ML (ref 4.78–24.2)
GFR SERPL CREATININE-BSD FRML MDRD: 104 ML/MIN/1.73
GFR SERPL CREATININE-BSD FRML MDRD: 63 ML/MIN/1.73
GFR SERPL CREATININE-BSD FRML MDRD: 83 ML/MIN/1.73
GFR SERPL CREATININE-BSD FRML MDRD: 85 ML/MIN/1.73
GFR SERPL CREATININE-BSD FRML MDRD: 91 ML/MIN/1.73
GFR SERPL CREATININE-BSD FRML MDRD: 93 ML/MIN/1.73
GLOBULIN UR ELPH-MCNC: 2.8 GM/DL
GLOBULIN UR ELPH-MCNC: 2.9 GM/DL
GLOBULIN UR ELPH-MCNC: 3.1 GM/DL
GLUCOSE SERPL-MCNC: 100 MG/DL (ref 65–99)
GLUCOSE SERPL-MCNC: 103 MG/DL (ref 65–99)
GLUCOSE SERPL-MCNC: 107 MG/DL (ref 65–99)
GLUCOSE SERPL-MCNC: 113 MG/DL (ref 65–99)
GLUCOSE SERPL-MCNC: 131 MG/DL (ref 65–99)
GLUCOSE SERPL-MCNC: 131 MG/DL (ref 65–99)
HCO3 BLDA-SCNC: 29.9 MMOL/L (ref 20–26)
HCT VFR BLD AUTO: 41 % (ref 34–46.6)
HCT VFR BLD AUTO: 42 % (ref 34–46.6)
HCT VFR BLD AUTO: 42.1 % (ref 34–46.6)
HCT VFR BLD AUTO: 43 % (ref 34–46.6)
HCT VFR BLD AUTO: 49.3 % (ref 34–46.6)
HCT VFR BLD AUTO: 49.9 % (ref 34–46.6)
HCT VFR BLD AUTO: 51.6 % (ref 34–46.6)
HCT VFR BLD CALC: 47 %
HGB BLD-MCNC: 13.8 G/DL (ref 12–15.9)
HGB BLD-MCNC: 13.8 G/DL (ref 12–15.9)
HGB BLD-MCNC: 14.4 G/DL (ref 12–15.9)
HGB BLD-MCNC: 14.5 G/DL (ref 12–15.9)
HGB BLD-MCNC: 16.1 G/DL (ref 12–15.9)
HGB BLD-MCNC: 16.3 G/DL (ref 12–15.9)
HGB BLD-MCNC: 17 G/DL (ref 12–15.9)
HGB BLDA-MCNC: 15.3 G/DL (ref 14–18)
HOLD SPECIMEN: NORMAL
IMM GRANULOCYTES # BLD AUTO: 0.02 10*3/MM3 (ref 0–0.05)
IMM GRANULOCYTES # BLD AUTO: 0.02 10*3/MM3 (ref 0–0.05)
IMM GRANULOCYTES # BLD AUTO: 0.03 10*3/MM3 (ref 0–0.05)
IMM GRANULOCYTES NFR BLD AUTO: 0.2 % (ref 0–0.5)
IMM GRANULOCYTES NFR BLD AUTO: 0.3 % (ref 0–0.5)
IMM GRANULOCYTES NFR BLD AUTO: 0.3 % (ref 0–0.5)
INHALED O2 CONCENTRATION: 80 %
IPAP: 0
LYMPHOCYTES # BLD AUTO: 0.62 10*3/MM3 (ref 0.7–3.1)
LYMPHOCYTES # BLD AUTO: 1.72 10*3/MM3 (ref 0.7–3.1)
LYMPHOCYTES # BLD AUTO: 1.77 10*3/MM3 (ref 0.7–3.1)
LYMPHOCYTES NFR BLD AUTO: 20.1 % (ref 19.6–45.3)
LYMPHOCYTES NFR BLD AUTO: 25 % (ref 19.6–45.3)
LYMPHOCYTES NFR BLD AUTO: 7.7 % (ref 19.6–45.3)
MACROCYTES BLD QL SMEAR: NORMAL
MAGNESIUM SERPL-MCNC: 1.8 MG/DL (ref 1.6–2.4)
MAGNESIUM SERPL-MCNC: 1.8 MG/DL (ref 1.6–2.4)
MAGNESIUM SERPL-MCNC: 2.1 MG/DL (ref 1.6–2.4)
MAGNESIUM SERPL-MCNC: 2.2 MG/DL (ref 1.6–2.4)
MCH RBC QN AUTO: 31.5 PG (ref 26.6–33)
MCH RBC QN AUTO: 34.2 PG (ref 26.6–33)
MCH RBC QN AUTO: 34.5 PG (ref 26.6–33)
MCH RBC QN AUTO: 34.6 PG (ref 26.6–33)
MCH RBC QN AUTO: 34.7 PG (ref 26.6–33)
MCH RBC QN AUTO: 34.7 PG (ref 26.6–33)
MCHC RBC AUTO-ENTMCNC: 31.2 G/DL (ref 31.5–35.7)
MCHC RBC AUTO-ENTMCNC: 32.8 G/DL (ref 31.5–35.7)
MCHC RBC AUTO-ENTMCNC: 33.1 G/DL (ref 31.5–35.7)
MCHC RBC AUTO-ENTMCNC: 33.7 G/DL (ref 31.5–35.7)
MCHC RBC AUTO-ENTMCNC: 33.7 G/DL (ref 31.5–35.7)
MCHC RBC AUTO-ENTMCNC: 34.1 G/DL (ref 31.5–35.7)
MCV RBC AUTO: 101.2 FL (ref 79–97)
MCV RBC AUTO: 102.8 FL (ref 79–97)
MCV RBC AUTO: 102.9 FL (ref 79–97)
MCV RBC AUTO: 104.2 FL (ref 79–97)
MCV RBC AUTO: 111.2 FL (ref 79–97)
MCV RBC AUTO: 95.2 FL (ref 79–97)
METHGB BLD QL: 0.4 % (ref 0–1.5)
MODALITY: ABNORMAL
MONOCYTES # BLD AUTO: 0.11 10*3/MM3 (ref 0.1–0.9)
MONOCYTES # BLD AUTO: 0.51 10*3/MM3 (ref 0.1–0.9)
MONOCYTES # BLD AUTO: 0.54 10*3/MM3 (ref 0.1–0.9)
MONOCYTES NFR BLD AUTO: 1.4 % (ref 5–12)
MONOCYTES NFR BLD AUTO: 5.8 % (ref 5–12)
MONOCYTES NFR BLD AUTO: 7.9 % (ref 5–12)
NEUTROPHILS NFR BLD AUTO: 4.12 10*3/MM3 (ref 1.7–7)
NEUTROPHILS NFR BLD AUTO: 59.9 % (ref 42.7–76)
NEUTROPHILS NFR BLD AUTO: 6.11 10*3/MM3 (ref 1.7–7)
NEUTROPHILS NFR BLD AUTO: 69.3 % (ref 42.7–76)
NEUTROPHILS NFR BLD AUTO: 7.33 10*3/MM3 (ref 1.7–7)
NEUTROPHILS NFR BLD AUTO: 90.6 % (ref 42.7–76)
NOTE: ABNORMAL
NRBC BLD AUTO-RTO: 0 /100 WBC (ref 0–0.2)
NT-PROBNP SERPL-MCNC: 373 PG/ML (ref 0–1800)
OXYHGB MFR BLDV: 97.9 % (ref 94–99)
PAW @ PEAK INSP FLOW SETTING VENT: 0 CMH2O
PCO2 BLDA: 53.6 MM HG (ref 35–45)
PCO2 TEMP ADJ BLD: 53.6 MM HG (ref 35–45)
PH BLDA: 7.36 PH UNITS (ref 7.35–7.45)
PH, TEMP CORRECTED: 7.36 PH UNITS
PLAT MORPH BLD: NORMAL
PLATELET # BLD AUTO: 184 10*3/MM3 (ref 140–450)
PLATELET # BLD AUTO: 220 10*3/MM3 (ref 140–450)
PLATELET # BLD AUTO: 225 10*3/MM3 (ref 140–450)
PLATELET # BLD AUTO: 232 10*3/MM3 (ref 140–450)
PLATELET # BLD AUTO: 270 10*3/MM3 (ref 140–450)
PLATELET # BLD AUTO: 333 10*3/MM3 (ref 140–450)
PMV BLD AUTO: 10.2 FL (ref 6–12)
PMV BLD AUTO: 9.3 FL (ref 6–12)
PMV BLD AUTO: 9.4 FL (ref 6–12)
PMV BLD AUTO: 9.6 FL (ref 6–12)
PMV BLD AUTO: 9.7 FL (ref 6–12)
PMV BLD AUTO: 9.9 FL (ref 6–12)
PO2 BLDA: 132 MM HG (ref 83–108)
PO2 TEMP ADJ BLD: 132 MM HG (ref 83–108)
POTASSIUM SERPL-SCNC: 3.8 MMOL/L (ref 3.5–5.2)
POTASSIUM SERPL-SCNC: 3.8 MMOL/L (ref 3.5–5.2)
POTASSIUM SERPL-SCNC: 3.9 MMOL/L (ref 3.5–5.2)
POTASSIUM SERPL-SCNC: 4.1 MMOL/L (ref 3.5–5.2)
POTASSIUM SERPL-SCNC: 4.3 MMOL/L (ref 3.5–5.2)
POTASSIUM SERPL-SCNC: 4.3 MMOL/L (ref 3.5–5.2)
PROCALCITONIN SERPL-MCNC: 0.06 NG/ML (ref 0–0.25)
PROT SERPL-MCNC: 6.3 G/DL (ref 6–8.5)
PROT SERPL-MCNC: 7.3 G/DL (ref 6–8.5)
PROT SERPL-MCNC: 7.7 G/DL (ref 6–8.5)
QT INTERVAL: 332 MS
QTC INTERVAL: 455 MS
RBC # BLD AUTO: 3.99 10*6/MM3 (ref 3.77–5.28)
RBC # BLD AUTO: 4.04 10*6/MM3 (ref 3.77–5.28)
RBC # BLD AUTO: 4.18 10*6/MM3 (ref 3.77–5.28)
RBC # BLD AUTO: 4.64 10*6/MM3 (ref 3.77–5.28)
RBC # BLD AUTO: 4.93 10*6/MM3 (ref 3.77–5.28)
RBC # BLD AUTO: 5.18 10*6/MM3 (ref 3.77–5.28)
SARS-COV-2 RNA RESP QL NAA+PROBE: NOT DETECTED
SODIUM SERPL-SCNC: 134 MMOL/L (ref 136–145)
SODIUM SERPL-SCNC: 137 MMOL/L (ref 136–145)
SODIUM SERPL-SCNC: 138 MMOL/L (ref 136–145)
SODIUM SERPL-SCNC: 139 MMOL/L (ref 136–145)
SODIUM SERPL-SCNC: 140 MMOL/L (ref 136–145)
SODIUM SERPL-SCNC: 141 MMOL/L (ref 136–145)
TOTAL RATE: 0 BREATHS/MINUTE
TROPONIN T SERPL-MCNC: <0.01 NG/ML (ref 0–0.03)
VIT B12 BLD-MCNC: 563 PG/ML (ref 211–946)
WBC # BLD AUTO: 11.66 10*3/MM3 (ref 3.4–10.8)
WBC # BLD AUTO: 6.87 10*3/MM3 (ref 3.4–10.8)
WBC # BLD AUTO: 6.94 10*3/MM3 (ref 3.4–10.8)
WBC # BLD AUTO: 8.09 10*3/MM3 (ref 3.4–10.8)
WBC # BLD AUTO: 8.82 10*3/MM3 (ref 3.4–10.8)
WBC # BLD AUTO: 8.96 10*3/MM3 (ref 3.4–10.8)
WBC MORPH BLD: NORMAL
WHOLE BLOOD HOLD SPECIMEN: NORMAL
WHOLE BLOOD HOLD SPECIMEN: NORMAL

## 2021-01-01 PROCEDURE — 83050 HGB METHEMOGLOBIN QUAN: CPT

## 2021-01-01 PROCEDURE — 71046 X-RAY EXAM CHEST 2 VIEWS: CPT

## 2021-01-01 PROCEDURE — 97535 SELF CARE MNGMENT TRAINING: CPT

## 2021-01-01 PROCEDURE — 82375 ASSAY CARBOXYHB QUANT: CPT

## 2021-01-01 PROCEDURE — 72148 MRI LUMBAR SPINE W/O DYE: CPT

## 2021-01-01 PROCEDURE — 99232 SBSQ HOSP IP/OBS MODERATE 35: CPT | Performed by: STUDENT IN AN ORGANIZED HEALTH CARE EDUCATION/TRAINING PROGRAM

## 2021-01-01 PROCEDURE — 82805 BLOOD GASES W/O2 SATURATION: CPT

## 2021-01-01 PROCEDURE — 97162 PT EVAL MOD COMPLEX 30 MIN: CPT

## 2021-01-01 PROCEDURE — 84145 PROCALCITONIN (PCT): CPT | Performed by: INTERNAL MEDICINE

## 2021-01-01 PROCEDURE — 36600 WITHDRAWAL OF ARTERIAL BLOOD: CPT

## 2021-01-01 PROCEDURE — 99283 EMERGENCY DEPT VISIT LOW MDM: CPT

## 2021-01-01 PROCEDURE — 99233 SBSQ HOSP IP/OBS HIGH 50: CPT | Performed by: INTERNAL MEDICINE

## 2021-01-01 PROCEDURE — 71045 X-RAY EXAM CHEST 1 VIEW: CPT

## 2021-01-01 PROCEDURE — 84484 ASSAY OF TROPONIN QUANT: CPT | Performed by: EMERGENCY MEDICINE

## 2021-01-01 PROCEDURE — 72100 X-RAY EXAM L-S SPINE 2/3 VWS: CPT

## 2021-01-01 PROCEDURE — 25010000002 LORAZEPAM PER 2 MG: Performed by: EMERGENCY MEDICINE

## 2021-01-01 PROCEDURE — 99231 SBSQ HOSP IP/OBS SF/LOW 25: CPT | Performed by: THORACIC SURGERY (CARDIOTHORACIC VASCULAR SURGERY)

## 2021-01-01 PROCEDURE — 85025 COMPLETE CBC W/AUTO DIFF WBC: CPT | Performed by: INTERNAL MEDICINE

## 2021-01-01 PROCEDURE — 85027 COMPLETE CBC AUTOMATED: CPT | Performed by: INTERNAL MEDICINE

## 2021-01-01 PROCEDURE — 63710000001 PREDNISONE PER 1 MG: Performed by: EMERGENCY MEDICINE

## 2021-01-01 PROCEDURE — 83735 ASSAY OF MAGNESIUM: CPT | Performed by: INTERNAL MEDICINE

## 2021-01-01 PROCEDURE — G0378 HOSPITAL OBSERVATION PER HR: HCPCS

## 2021-01-01 PROCEDURE — 94799 UNLISTED PULMONARY SVC/PX: CPT

## 2021-01-01 PROCEDURE — 85025 COMPLETE CBC W/AUTO DIFF WBC: CPT | Performed by: EMERGENCY MEDICINE

## 2021-01-01 PROCEDURE — 25010000002 ENOXAPARIN PER 10 MG: Performed by: INTERNAL MEDICINE

## 2021-01-01 PROCEDURE — 25010000002 METHYLPREDNISOLONE PER 40 MG: Performed by: INTERNAL MEDICINE

## 2021-01-01 PROCEDURE — 94640 AIRWAY INHALATION TREATMENT: CPT

## 2021-01-01 PROCEDURE — 86140 C-REACTIVE PROTEIN: CPT | Performed by: EMERGENCY MEDICINE

## 2021-01-01 PROCEDURE — 70220 X-RAY EXAM OF SINUSES: CPT

## 2021-01-01 PROCEDURE — 25010000002 METHYLPREDNISOLONE PER 125 MG: Performed by: INTERNAL MEDICINE

## 2021-01-01 PROCEDURE — 97530 THERAPEUTIC ACTIVITIES: CPT

## 2021-01-01 PROCEDURE — 80048 BASIC METABOLIC PNL TOTAL CA: CPT | Performed by: INTERNAL MEDICINE

## 2021-01-01 PROCEDURE — 0W9B00Z DRAINAGE OF LEFT PLEURAL CAVITY WITH DRAINAGE DEVICE, OPEN APPROACH: ICD-10-PCS | Performed by: STUDENT IN AN ORGANIZED HEALTH CARE EDUCATION/TRAINING PROGRAM

## 2021-01-01 PROCEDURE — 99222 1ST HOSP IP/OBS MODERATE 55: CPT | Performed by: THORACIC SURGERY (CARDIOTHORACIC VASCULAR SURGERY)

## 2021-01-01 PROCEDURE — 80053 COMPREHEN METABOLIC PANEL: CPT | Performed by: INTERNAL MEDICINE

## 2021-01-01 PROCEDURE — 93005 ELECTROCARDIOGRAM TRACING: CPT | Performed by: EMERGENCY MEDICINE

## 2021-01-01 PROCEDURE — 99223 1ST HOSP IP/OBS HIGH 75: CPT | Performed by: INTERNAL MEDICINE

## 2021-01-01 PROCEDURE — 99232 SBSQ HOSP IP/OBS MODERATE 35: CPT | Performed by: INTERNAL MEDICINE

## 2021-01-01 PROCEDURE — 82746 ASSAY OF FOLIC ACID SERUM: CPT | Performed by: INTERNAL MEDICINE

## 2021-01-01 PROCEDURE — 85014 HEMATOCRIT: CPT | Performed by: INTERNAL MEDICINE

## 2021-01-01 PROCEDURE — 99232 SBSQ HOSP IP/OBS MODERATE 35: CPT | Performed by: NURSE PRACTITIONER

## 2021-01-01 PROCEDURE — 80053 COMPREHEN METABOLIC PANEL: CPT | Performed by: EMERGENCY MEDICINE

## 2021-01-01 PROCEDURE — 85007 BL SMEAR W/DIFF WBC COUNT: CPT | Performed by: INTERNAL MEDICINE

## 2021-01-01 PROCEDURE — 0 IOPAMIDOL PER 1 ML: Performed by: INTERNAL MEDICINE

## 2021-01-01 PROCEDURE — 82607 VITAMIN B-12: CPT | Performed by: INTERNAL MEDICINE

## 2021-01-01 PROCEDURE — 63710000001 PREDNISONE PER 1 MG: Performed by: INTERNAL MEDICINE

## 2021-01-01 PROCEDURE — 85018 HEMOGLOBIN: CPT | Performed by: INTERNAL MEDICINE

## 2021-01-01 PROCEDURE — 97166 OT EVAL MOD COMPLEX 45 MIN: CPT

## 2021-01-01 PROCEDURE — 25010000002 MAGNESIUM SULFATE 2 GM/50ML SOLUTION: Performed by: INTERNAL MEDICINE

## 2021-01-01 PROCEDURE — 73502 X-RAY EXAM HIP UNI 2-3 VIEWS: CPT

## 2021-01-01 PROCEDURE — 71275 CT ANGIOGRAPHY CHEST: CPT

## 2021-01-01 PROCEDURE — 99239 HOSP IP/OBS DSCHRG MGMT >30: CPT | Performed by: NURSE PRACTITIONER

## 2021-01-01 PROCEDURE — 87636 SARSCOV2 & INF A&B AMP PRB: CPT | Performed by: EMERGENCY MEDICINE

## 2021-01-01 PROCEDURE — 85652 RBC SED RATE AUTOMATED: CPT | Performed by: EMERGENCY MEDICINE

## 2021-01-01 PROCEDURE — 97110 THERAPEUTIC EXERCISES: CPT

## 2021-01-01 PROCEDURE — 99285 EMERGENCY DEPT VISIT HI MDM: CPT

## 2021-01-01 PROCEDURE — 83880 ASSAY OF NATRIURETIC PEPTIDE: CPT | Performed by: EMERGENCY MEDICINE

## 2021-01-01 RX ORDER — DIAZEPAM 5 MG/1
2.5 TABLET ORAL EVERY 8 HOURS
Status: DISCONTINUED | OUTPATIENT
Start: 2021-01-01 | End: 2021-01-01

## 2021-01-01 RX ORDER — DIAZEPAM 5 MG/1
2.5 TABLET ORAL EVERY 8 HOURS PRN
Status: DISCONTINUED | OUTPATIENT
Start: 2021-01-01 | End: 2021-01-01 | Stop reason: HOSPADM

## 2021-01-01 RX ORDER — IPRATROPIUM BROMIDE AND ALBUTEROL SULFATE 2.5; .5 MG/3ML; MG/3ML
3 SOLUTION RESPIRATORY (INHALATION)
Status: DISCONTINUED | OUTPATIENT
Start: 2021-01-01 | End: 2021-01-01

## 2021-01-01 RX ORDER — HEPARIN SODIUM 5000 [USP'U]/ML
5000 INJECTION, SOLUTION INTRAVENOUS; SUBCUTANEOUS EVERY 8 HOURS SCHEDULED
Status: DISCONTINUED | OUTPATIENT
Start: 2021-01-01 | End: 2021-01-01

## 2021-01-01 RX ORDER — METHYLPREDNISOLONE SODIUM SUCCINATE 40 MG/ML
20 INJECTION, POWDER, LYOPHILIZED, FOR SOLUTION INTRAMUSCULAR; INTRAVENOUS EVERY 12 HOURS
Status: COMPLETED | OUTPATIENT
Start: 2021-01-01 | End: 2021-01-01

## 2021-01-01 RX ORDER — MAGNESIUM SULFATE HEPTAHYDRATE 40 MG/ML
2 INJECTION, SOLUTION INTRAVENOUS AS NEEDED
Status: DISCONTINUED | OUTPATIENT
Start: 2021-01-01 | End: 2021-01-01 | Stop reason: HOSPADM

## 2021-01-01 RX ORDER — BISACODYL 10 MG
10 SUPPOSITORY, RECTAL RECTAL DAILY PRN
Status: DISCONTINUED | OUTPATIENT
Start: 2021-01-01 | End: 2021-01-01 | Stop reason: HOSPADM

## 2021-01-01 RX ORDER — LIDOCAINE HYDROCHLORIDE AND EPINEPHRINE 10; 10 MG/ML; UG/ML
10 INJECTION, SOLUTION INFILTRATION; PERINEURAL ONCE
Status: COMPLETED | OUTPATIENT
Start: 2021-01-01 | End: 2021-01-01

## 2021-01-01 RX ORDER — PREDNISONE 20 MG/1
20 TABLET ORAL
Status: COMPLETED | OUTPATIENT
Start: 2021-01-01 | End: 2021-01-01

## 2021-01-01 RX ORDER — BISACODYL 5 MG/1
5 TABLET, DELAYED RELEASE ORAL DAILY PRN
Start: 2021-01-01 | End: 2022-01-01 | Stop reason: HOSPADM

## 2021-01-01 RX ORDER — AMOXICILLIN 250 MG
2 CAPSULE ORAL 2 TIMES DAILY
Start: 2021-01-01

## 2021-01-01 RX ORDER — ACETAMINOPHEN 325 MG/1
650 TABLET ORAL EVERY 4 HOURS PRN
Status: DISCONTINUED | OUTPATIENT
Start: 2021-01-01 | End: 2021-01-01 | Stop reason: HOSPADM

## 2021-01-01 RX ORDER — AMOXICILLIN 250 MG
2 CAPSULE ORAL 2 TIMES DAILY
Status: DISCONTINUED | OUTPATIENT
Start: 2021-01-01 | End: 2021-01-01 | Stop reason: HOSPADM

## 2021-01-01 RX ORDER — POLYETHYLENE GLYCOL 3350 17 G/17G
17 POWDER, FOR SOLUTION ORAL DAILY PRN
Status: DISCONTINUED | OUTPATIENT
Start: 2021-01-01 | End: 2021-01-01 | Stop reason: HOSPADM

## 2021-01-01 RX ORDER — HYDROCODONE BITARTRATE AND ACETAMINOPHEN 5; 325 MG/1; MG/1
1 TABLET ORAL EVERY 4 HOURS PRN
Status: DISCONTINUED | OUTPATIENT
Start: 2021-01-01 | End: 2021-01-01 | Stop reason: HOSPADM

## 2021-01-01 RX ORDER — MAGNESIUM SULFATE 1 G/100ML
1 INJECTION INTRAVENOUS AS NEEDED
Status: DISCONTINUED | OUTPATIENT
Start: 2021-01-01 | End: 2021-01-01 | Stop reason: HOSPADM

## 2021-01-01 RX ORDER — POTASSIUM CHLORIDE 750 MG/1
40 CAPSULE, EXTENDED RELEASE ORAL AS NEEDED
Status: DISCONTINUED | OUTPATIENT
Start: 2021-01-01 | End: 2021-01-01 | Stop reason: HOSPADM

## 2021-01-01 RX ORDER — LEVETIRACETAM 500 MG/1
500 TABLET ORAL EVERY 12 HOURS SCHEDULED
Status: DISCONTINUED | OUTPATIENT
Start: 2021-01-01 | End: 2021-01-01 | Stop reason: HOSPADM

## 2021-01-01 RX ORDER — METHYLPREDNISOLONE SODIUM SUCCINATE 125 MG/2ML
60 INJECTION, POWDER, LYOPHILIZED, FOR SOLUTION INTRAMUSCULAR; INTRAVENOUS EVERY 8 HOURS SCHEDULED
Status: DISCONTINUED | OUTPATIENT
Start: 2021-01-01 | End: 2021-01-01

## 2021-01-01 RX ORDER — POTASSIUM CHLORIDE 1.5 G/1.77G
40 POWDER, FOR SOLUTION ORAL AS NEEDED
Status: DISCONTINUED | OUTPATIENT
Start: 2021-01-01 | End: 2021-01-01 | Stop reason: HOSPADM

## 2021-01-01 RX ORDER — METHYLPREDNISOLONE 4 MG/1
TABLET ORAL
Qty: 21 TABLET | Refills: 0 | Status: SHIPPED | OUTPATIENT
Start: 2021-01-01 | End: 2021-01-01 | Stop reason: HOSPADM

## 2021-01-01 RX ORDER — ONDANSETRON 4 MG/1
4 TABLET, FILM COATED ORAL EVERY 6 HOURS PRN
Status: DISCONTINUED | OUTPATIENT
Start: 2021-01-01 | End: 2021-01-01 | Stop reason: HOSPADM

## 2021-01-01 RX ORDER — AMLODIPINE BESYLATE 5 MG/1
5 TABLET ORAL
Status: DISCONTINUED | OUTPATIENT
Start: 2021-01-01 | End: 2021-01-01

## 2021-01-01 RX ORDER — IPRATROPIUM BROMIDE AND ALBUTEROL SULFATE 2.5; .5 MG/3ML; MG/3ML
3 SOLUTION RESPIRATORY (INHALATION) EVERY 4 HOURS PRN
Qty: 360 ML
Start: 2021-01-01

## 2021-01-01 RX ORDER — FAMOTIDINE 10 MG
10 TABLET ORAL 2 TIMES DAILY
COMMUNITY

## 2021-01-01 RX ORDER — ONDANSETRON 4 MG/1
4 TABLET, FILM COATED ORAL EVERY 6 HOURS PRN
Status: ON HOLD
Start: 2021-01-01 | End: 2022-01-01

## 2021-01-01 RX ORDER — PREDNISONE 20 MG/1
40 TABLET ORAL ONCE
Status: COMPLETED | OUTPATIENT
Start: 2021-01-01 | End: 2021-01-01

## 2021-01-01 RX ORDER — LORAZEPAM 2 MG/ML
1 INJECTION INTRAMUSCULAR ONCE
Status: COMPLETED | OUTPATIENT
Start: 2021-01-01 | End: 2021-01-01

## 2021-01-01 RX ORDER — POTASSIUM CHLORIDE 7.45 MG/ML
10 INJECTION INTRAVENOUS
Status: DISCONTINUED | OUTPATIENT
Start: 2021-01-01 | End: 2021-01-01 | Stop reason: HOSPADM

## 2021-01-01 RX ORDER — METHYLPREDNISOLONE SODIUM SUCCINATE 40 MG/ML
40 INJECTION, POWDER, LYOPHILIZED, FOR SOLUTION INTRAMUSCULAR; INTRAVENOUS EVERY 12 HOURS
Status: DISCONTINUED | OUTPATIENT
Start: 2021-01-01 | End: 2021-01-01

## 2021-01-01 RX ORDER — DIAZEPAM 5 MG/1
2.5 TABLET ORAL 3 TIMES DAILY PRN
Start: 2021-01-01 | End: 2022-01-01 | Stop reason: HOSPADM

## 2021-01-01 RX ORDER — SODIUM CHLORIDE 0.9 % (FLUSH) 0.9 %
10 SYRINGE (ML) INJECTION EVERY 12 HOURS SCHEDULED
Status: DISCONTINUED | OUTPATIENT
Start: 2021-01-01 | End: 2021-01-01 | Stop reason: HOSPADM

## 2021-01-01 RX ORDER — METHYLPREDNISOLONE SODIUM SUCCINATE 125 MG/2ML
60 INJECTION, POWDER, LYOPHILIZED, FOR SOLUTION INTRAMUSCULAR; INTRAVENOUS EVERY 12 HOURS
Status: DISCONTINUED | OUTPATIENT
Start: 2021-01-01 | End: 2021-01-01

## 2021-01-01 RX ORDER — ONDANSETRON 2 MG/ML
4 INJECTION INTRAMUSCULAR; INTRAVENOUS EVERY 6 HOURS PRN
Status: DISCONTINUED | OUTPATIENT
Start: 2021-01-01 | End: 2021-01-01 | Stop reason: HOSPADM

## 2021-01-01 RX ORDER — SODIUM CHLORIDE 0.9 % (FLUSH) 0.9 %
10 SYRINGE (ML) INJECTION AS NEEDED
Status: DISCONTINUED | OUTPATIENT
Start: 2021-01-01 | End: 2021-01-01 | Stop reason: HOSPADM

## 2021-01-01 RX ORDER — AMLODIPINE BESYLATE 2.5 MG/1
2.5 TABLET ORAL
Status: DISCONTINUED | OUTPATIENT
Start: 2021-01-01 | End: 2021-01-01

## 2021-01-01 RX ORDER — BISACODYL 5 MG/1
5 TABLET, DELAYED RELEASE ORAL DAILY PRN
Status: DISCONTINUED | OUTPATIENT
Start: 2021-01-01 | End: 2021-01-01 | Stop reason: HOSPADM

## 2021-01-01 RX ORDER — MAGNESIUM SULFATE HEPTAHYDRATE 40 MG/ML
4 INJECTION, SOLUTION INTRAVENOUS AS NEEDED
Status: DISCONTINUED | OUTPATIENT
Start: 2021-01-01 | End: 2021-01-01 | Stop reason: HOSPADM

## 2021-01-01 RX ORDER — PREDNISONE 20 MG/1
40 TABLET ORAL
Status: COMPLETED | OUTPATIENT
Start: 2021-01-01 | End: 2021-01-01

## 2021-01-01 RX ORDER — FAMOTIDINE 20 MG/1
10 TABLET, FILM COATED ORAL DAILY
Status: DISCONTINUED | OUTPATIENT
Start: 2021-01-01 | End: 2021-01-01 | Stop reason: HOSPADM

## 2021-01-01 RX ORDER — PAROXETINE HYDROCHLORIDE 20 MG/1
20 TABLET, FILM COATED ORAL DAILY
Status: DISCONTINUED | OUTPATIENT
Start: 2021-01-01 | End: 2021-01-01 | Stop reason: HOSPADM

## 2021-01-01 RX ORDER — DOXYCYCLINE 100 MG/1
100 CAPSULE ORAL EVERY 12 HOURS SCHEDULED
Status: COMPLETED | OUTPATIENT
Start: 2021-01-01 | End: 2021-01-01

## 2021-01-01 RX ORDER — IPRATROPIUM BROMIDE AND ALBUTEROL SULFATE 2.5; .5 MG/3ML; MG/3ML
3 SOLUTION RESPIRATORY (INHALATION) EVERY 4 HOURS PRN
Status: DISCONTINUED | OUTPATIENT
Start: 2021-01-01 | End: 2021-01-01 | Stop reason: HOSPADM

## 2021-01-01 RX ADMIN — IPRATROPIUM BROMIDE AND ALBUTEROL SULFATE 3 ML: 2.5; .5 SOLUTION RESPIRATORY (INHALATION) at 16:09

## 2021-01-01 RX ADMIN — PREDNISONE 40 MG: 20 TABLET ORAL at 08:56

## 2021-01-01 RX ADMIN — IPRATROPIUM BROMIDE AND ALBUTEROL SULFATE 3 ML: 2.5; .5 SOLUTION RESPIRATORY (INHALATION) at 11:55

## 2021-01-01 RX ADMIN — METHYLPREDNISOLONE SODIUM SUCCINATE 60 MG: 125 INJECTION, POWDER, FOR SOLUTION INTRAMUSCULAR; INTRAVENOUS at 18:17

## 2021-01-01 RX ADMIN — IPRATROPIUM BROMIDE AND ALBUTEROL SULFATE 3 ML: 2.5; .5 SOLUTION RESPIRATORY (INHALATION) at 19:16

## 2021-01-01 RX ADMIN — AMLODIPINE BESYLATE 5 MG: 5 TABLET ORAL at 09:08

## 2021-01-01 RX ADMIN — LEVETIRACETAM 500 MG: 500 TABLET, FILM COATED ORAL at 09:08

## 2021-01-01 RX ADMIN — LEVETIRACETAM 500 MG: 500 TABLET, FILM COATED ORAL at 21:19

## 2021-01-01 RX ADMIN — DOXYCYCLINE 100 MG: 100 CAPSULE ORAL at 22:12

## 2021-01-01 RX ADMIN — LEVETIRACETAM 500 MG: 500 TABLET, FILM COATED ORAL at 08:57

## 2021-01-01 RX ADMIN — LEVETIRACETAM 500 MG: 500 TABLET, FILM COATED ORAL at 19:57

## 2021-01-01 RX ADMIN — AMLODIPINE BESYLATE 5 MG: 5 TABLET ORAL at 09:30

## 2021-01-01 RX ADMIN — IPRATROPIUM BROMIDE AND ALBUTEROL SULFATE 3 ML: 2.5; .5 SOLUTION RESPIRATORY (INHALATION) at 12:51

## 2021-01-01 RX ADMIN — IPRATROPIUM BROMIDE AND ALBUTEROL SULFATE 3 ML: 2.5; .5 SOLUTION RESPIRATORY (INHALATION) at 06:47

## 2021-01-01 RX ADMIN — METHYLPREDNISOLONE SODIUM SUCCINATE 60 MG: 125 INJECTION, POWDER, FOR SOLUTION INTRAMUSCULAR; INTRAVENOUS at 15:47

## 2021-01-01 RX ADMIN — FAMOTIDINE 10 MG: 20 TABLET, FILM COATED ORAL at 08:57

## 2021-01-01 RX ADMIN — PAROXETINE HYDROCHLORIDE 20 MG: 20 TABLET, FILM COATED ORAL at 08:03

## 2021-01-01 RX ADMIN — LEVETIRACETAM 500 MG: 500 TABLET, FILM COATED ORAL at 09:30

## 2021-01-01 RX ADMIN — IPRATROPIUM BROMIDE AND ALBUTEROL SULFATE 3 ML: 2.5; .5 SOLUTION RESPIRATORY (INHALATION) at 12:57

## 2021-01-01 RX ADMIN — FAMOTIDINE 10 MG: 20 TABLET, FILM COATED ORAL at 09:08

## 2021-01-01 RX ADMIN — IOPAMIDOL 75 ML: 755 INJECTION, SOLUTION INTRAVENOUS at 15:09

## 2021-01-01 RX ADMIN — DOCUSATE SODIUM 50MG AND SENNOSIDES 8.6MG 2 TABLET: 8.6; 5 TABLET, FILM COATED ORAL at 22:07

## 2021-01-01 RX ADMIN — IPRATROPIUM BROMIDE AND ALBUTEROL SULFATE 3 ML: 2.5; .5 SOLUTION RESPIRATORY (INHALATION) at 21:57

## 2021-01-01 RX ADMIN — DIAZEPAM 2.5 MG: 5 TABLET ORAL at 20:56

## 2021-01-01 RX ADMIN — LEVETIRACETAM 500 MG: 500 TABLET, FILM COATED ORAL at 09:16

## 2021-01-01 RX ADMIN — LEVETIRACETAM 500 MG: 500 TABLET, FILM COATED ORAL at 21:26

## 2021-01-01 RX ADMIN — FAMOTIDINE 10 MG: 20 TABLET, FILM COATED ORAL at 09:30

## 2021-01-01 RX ADMIN — FAMOTIDINE 10 MG: 20 TABLET, FILM COATED ORAL at 08:03

## 2021-01-01 RX ADMIN — PAROXETINE HYDROCHLORIDE 20 MG: 20 TABLET, FILM COATED ORAL at 20:55

## 2021-01-01 RX ADMIN — LEVETIRACETAM 500 MG: 500 TABLET, FILM COATED ORAL at 09:26

## 2021-01-01 RX ADMIN — METHYLPREDNISOLONE SODIUM SUCCINATE 60 MG: 125 INJECTION, POWDER, FOR SOLUTION INTRAMUSCULAR; INTRAVENOUS at 21:19

## 2021-01-01 RX ADMIN — LEVETIRACETAM 500 MG: 500 TABLET, FILM COATED ORAL at 20:32

## 2021-01-01 RX ADMIN — PAROXETINE HYDROCHLORIDE 20 MG: 20 TABLET, FILM COATED ORAL at 09:16

## 2021-01-01 RX ADMIN — LEVETIRACETAM 500 MG: 500 TABLET, FILM COATED ORAL at 22:07

## 2021-01-01 RX ADMIN — HYDROCODONE BITARTRATE AND ACETAMINOPHEN 1 TABLET: 5; 325 TABLET ORAL at 16:44

## 2021-01-01 RX ADMIN — DOXYCYCLINE 100 MG: 100 CAPSULE ORAL at 20:52

## 2021-01-01 RX ADMIN — DOXYCYCLINE 100 MG: 100 CAPSULE ORAL at 08:56

## 2021-01-01 RX ADMIN — SODIUM CHLORIDE, PRESERVATIVE FREE 10 ML: 5 INJECTION INTRAVENOUS at 09:00

## 2021-01-01 RX ADMIN — HYDROCODONE BITARTRATE AND ACETAMINOPHEN 1 TABLET: 5; 325 TABLET ORAL at 09:46

## 2021-01-01 RX ADMIN — ENOXAPARIN SODIUM 40 MG: 40 INJECTION SUBCUTANEOUS at 15:43

## 2021-01-01 RX ADMIN — AMLODIPINE BESYLATE 2.5 MG: 2.5 TABLET ORAL at 08:03

## 2021-01-01 RX ADMIN — IPRATROPIUM BROMIDE AND ALBUTEROL SULFATE 3 ML: 2.5; .5 SOLUTION RESPIRATORY (INHALATION) at 19:35

## 2021-01-01 RX ADMIN — FAMOTIDINE 10 MG: 20 TABLET, FILM COATED ORAL at 09:16

## 2021-01-01 RX ADMIN — LEVETIRACETAM 500 MG: 500 TABLET, FILM COATED ORAL at 20:56

## 2021-01-01 RX ADMIN — ENOXAPARIN SODIUM 40 MG: 40 INJECTION SUBCUTANEOUS at 18:03

## 2021-01-01 RX ADMIN — LEVETIRACETAM 500 MG: 500 TABLET, FILM COATED ORAL at 22:13

## 2021-01-01 RX ADMIN — PREDNISONE 40 MG: 20 TABLET ORAL at 18:49

## 2021-01-01 RX ADMIN — IPRATROPIUM BROMIDE AND ALBUTEROL SULFATE 3 ML: 2.5; .5 SOLUTION RESPIRATORY (INHALATION) at 14:52

## 2021-01-01 RX ADMIN — DOXYCYCLINE 100 MG: 100 CAPSULE ORAL at 21:22

## 2021-01-01 RX ADMIN — IPRATROPIUM BROMIDE AND ALBUTEROL SULFATE 3 ML: 2.5; .5 SOLUTION RESPIRATORY (INHALATION) at 11:17

## 2021-01-01 RX ADMIN — SODIUM CHLORIDE, PRESERVATIVE FREE 10 ML: 5 INJECTION INTRAVENOUS at 21:22

## 2021-01-01 RX ADMIN — SODIUM CHLORIDE, PRESERVATIVE FREE 10 ML: 5 INJECTION INTRAVENOUS at 20:52

## 2021-01-01 RX ADMIN — DOCUSATE SODIUM 50MG AND SENNOSIDES 8.6MG 2 TABLET: 8.6; 5 TABLET, FILM COATED ORAL at 08:06

## 2021-01-01 RX ADMIN — ENOXAPARIN SODIUM 40 MG: 40 INJECTION SUBCUTANEOUS at 17:22

## 2021-01-01 RX ADMIN — IPRATROPIUM BROMIDE AND ALBUTEROL SULFATE 3 ML: 2.5; .5 SOLUTION RESPIRATORY (INHALATION) at 08:29

## 2021-01-01 RX ADMIN — LEVETIRACETAM 500 MG: 500 TABLET, FILM COATED ORAL at 09:25

## 2021-01-01 RX ADMIN — HYDROCODONE BITARTRATE AND ACETAMINOPHEN 1 TABLET: 5; 325 TABLET ORAL at 20:57

## 2021-01-01 RX ADMIN — IPRATROPIUM BROMIDE AND ALBUTEROL SULFATE 3 ML: 2.5; .5 SOLUTION RESPIRATORY (INHALATION) at 16:03

## 2021-01-01 RX ADMIN — FAMOTIDINE 10 MG: 20 TABLET, FILM COATED ORAL at 08:07

## 2021-01-01 RX ADMIN — IPRATROPIUM BROMIDE AND ALBUTEROL SULFATE 3 ML: 2.5; .5 SOLUTION RESPIRATORY (INHALATION) at 08:22

## 2021-01-01 RX ADMIN — SODIUM CHLORIDE, PRESERVATIVE FREE 10 ML: 5 INJECTION INTRAVENOUS at 16:44

## 2021-01-01 RX ADMIN — HYDROCODONE BITARTRATE AND ACETAMINOPHEN 1 TABLET: 5; 325 TABLET ORAL at 21:19

## 2021-01-01 RX ADMIN — DOXYCYCLINE 100 MG: 100 CAPSULE ORAL at 08:03

## 2021-01-01 RX ADMIN — FAMOTIDINE 10 MG: 20 TABLET, FILM COATED ORAL at 09:25

## 2021-01-01 RX ADMIN — DOCUSATE SODIUM 50MG AND SENNOSIDES 8.6MG 2 TABLET: 8.6; 5 TABLET, FILM COATED ORAL at 08:03

## 2021-01-01 RX ADMIN — ENOXAPARIN SODIUM 40 MG: 40 INJECTION SUBCUTANEOUS at 16:42

## 2021-01-01 RX ADMIN — SODIUM CHLORIDE, PRESERVATIVE FREE 10 ML: 5 INJECTION INTRAVENOUS at 20:55

## 2021-01-01 RX ADMIN — PAROXETINE HYDROCHLORIDE 20 MG: 20 TABLET, FILM COATED ORAL at 09:30

## 2021-01-01 RX ADMIN — IPRATROPIUM BROMIDE AND ALBUTEROL SULFATE 3 ML: 2.5; .5 SOLUTION RESPIRATORY (INHALATION) at 16:15

## 2021-01-01 RX ADMIN — PAROXETINE HYDROCHLORIDE 20 MG: 20 TABLET, FILM COATED ORAL at 09:26

## 2021-01-01 RX ADMIN — METHYLPREDNISOLONE SODIUM SUCCINATE 20 MG: 40 INJECTION, POWDER, FOR SOLUTION INTRAMUSCULAR; INTRAVENOUS at 17:23

## 2021-01-01 RX ADMIN — METHYLPREDNISOLONE SODIUM SUCCINATE 60 MG: 125 INJECTION, POWDER, FOR SOLUTION INTRAMUSCULAR; INTRAVENOUS at 04:20

## 2021-01-01 RX ADMIN — PAROXETINE HYDROCHLORIDE 20 MG: 20 TABLET, FILM COATED ORAL at 08:06

## 2021-01-01 RX ADMIN — AMLODIPINE BESYLATE 5 MG: 5 TABLET ORAL at 20:56

## 2021-01-01 RX ADMIN — MAGNESIUM SULFATE HEPTAHYDRATE 2 G: 2 INJECTION, SOLUTION INTRAVENOUS at 15:33

## 2021-01-01 RX ADMIN — DOXYCYCLINE 100 MG: 100 CAPSULE ORAL at 15:48

## 2021-01-01 RX ADMIN — LEVETIRACETAM 500 MG: 500 TABLET, FILM COATED ORAL at 08:03

## 2021-01-01 RX ADMIN — IPRATROPIUM BROMIDE AND ALBUTEROL SULFATE 3 ML: 2.5; .5 SOLUTION RESPIRATORY (INHALATION) at 13:05

## 2021-01-01 RX ADMIN — PREDNISONE 20 MG: 20 TABLET ORAL at 09:25

## 2021-01-01 RX ADMIN — HYDROCODONE BITARTRATE AND ACETAMINOPHEN 1 TABLET: 5; 325 TABLET ORAL at 04:20

## 2021-01-01 RX ADMIN — IPRATROPIUM BROMIDE AND ALBUTEROL SULFATE 3 ML: 2.5; .5 SOLUTION RESPIRATORY (INHALATION) at 20:08

## 2021-01-01 RX ADMIN — IPRATROPIUM BROMIDE AND ALBUTEROL SULFATE 3 ML: 2.5; .5 SOLUTION RESPIRATORY (INHALATION) at 08:57

## 2021-01-01 RX ADMIN — PAROXETINE HYDROCHLORIDE 20 MG: 20 TABLET, FILM COATED ORAL at 09:08

## 2021-01-01 RX ADMIN — PREDNISONE 20 MG: 20 TABLET ORAL at 08:06

## 2021-01-01 RX ADMIN — DOXYCYCLINE 100 MG: 100 CAPSULE ORAL at 19:57

## 2021-01-01 RX ADMIN — HYDROCODONE BITARTRATE AND ACETAMINOPHEN 1 TABLET: 5; 325 TABLET ORAL at 17:13

## 2021-01-01 RX ADMIN — DOXYCYCLINE 100 MG: 100 CAPSULE ORAL at 09:26

## 2021-01-01 RX ADMIN — DOCUSATE SODIUM 50MG AND SENNOSIDES 8.6MG 2 TABLET: 8.6; 5 TABLET, FILM COATED ORAL at 08:57

## 2021-01-01 RX ADMIN — LIDOCAINE HYDROCHLORIDE,EPINEPHRINE BITARTRATE 10 ML: 10; .01 INJECTION, SOLUTION INFILTRATION; PERINEURAL at 11:44

## 2021-01-01 RX ADMIN — METHYLPREDNISOLONE SODIUM SUCCINATE 40 MG: 40 INJECTION, POWDER, FOR SOLUTION INTRAMUSCULAR; INTRAVENOUS at 05:46

## 2021-01-01 RX ADMIN — IPRATROPIUM BROMIDE AND ALBUTEROL SULFATE 3 ML: 2.5; .5 SOLUTION RESPIRATORY (INHALATION) at 15:24

## 2021-01-01 RX ADMIN — PAROXETINE HYDROCHLORIDE 20 MG: 20 TABLET, FILM COATED ORAL at 08:56

## 2021-01-01 RX ADMIN — LORAZEPAM 1 MG: 2 INJECTION INTRAMUSCULAR; INTRAVENOUS at 11:45

## 2021-01-01 RX ADMIN — DOXYCYCLINE 100 MG: 100 CAPSULE ORAL at 09:08

## 2021-01-01 RX ADMIN — METHYLPREDNISOLONE SODIUM SUCCINATE 60 MG: 125 INJECTION, POWDER, FOR SOLUTION INTRAMUSCULAR; INTRAVENOUS at 06:26

## 2021-01-01 RX ADMIN — DOXYCYCLINE 100 MG: 100 CAPSULE ORAL at 22:08

## 2021-01-01 RX ADMIN — FAMOTIDINE 10 MG: 20 TABLET, FILM COATED ORAL at 09:26

## 2021-01-01 RX ADMIN — FAMOTIDINE 10 MG: 20 TABLET, FILM COATED ORAL at 20:56

## 2021-01-01 RX ADMIN — IPRATROPIUM BROMIDE AND ALBUTEROL SULFATE 3 ML: 2.5; .5 SOLUTION RESPIRATORY (INHALATION) at 11:00

## 2021-01-01 RX ADMIN — DIAZEPAM 2.5 MG: 5 TABLET ORAL at 20:52

## 2021-01-01 RX ADMIN — IPRATROPIUM BROMIDE AND ALBUTEROL SULFATE 3 ML: 2.5; .5 SOLUTION RESPIRATORY (INHALATION) at 21:01

## 2021-01-01 RX ADMIN — SODIUM CHLORIDE, PRESERVATIVE FREE 10 ML: 5 INJECTION INTRAVENOUS at 19:57

## 2021-01-01 RX ADMIN — DOCUSATE SODIUM 50MG AND SENNOSIDES 8.6MG 2 TABLET: 8.6; 5 TABLET, FILM COATED ORAL at 20:56

## 2021-01-01 RX ADMIN — LEVETIRACETAM 500 MG: 500 TABLET, FILM COATED ORAL at 20:52

## 2021-01-01 RX ADMIN — HYDROCODONE BITARTRATE AND ACETAMINOPHEN 1 TABLET: 5; 325 TABLET ORAL at 15:48

## 2021-01-01 RX ADMIN — LEVETIRACETAM 500 MG: 500 TABLET, FILM COATED ORAL at 08:07

## 2021-03-09 ENCOUNTER — TELEPHONE (OUTPATIENT)
Dept: ORTHOPEDIC SURGERY | Facility: CLINIC | Age: 81
End: 2021-03-09

## 2021-03-09 NOTE — TELEPHONE ENCOUNTER
Can you please call the patient and have her come in for an appointment for this issue. We technically are unable to give her any advice on this since we have not seen her in the office here with Dr. Damon.     Thank you,    Brandy

## 2021-03-09 NOTE — TELEPHONE ENCOUNTER
Caller: MONSTER ADAME    Relationship to patient: SELF    Best call back number: [phone]    Patient is needing: PATIENT WANTED TO TALK TO MA/DR CABRAL- PATIENT HAD SURGERY WITH DR CABRAL 5 YEARS AGO 3/2016. PATIENT STATED AFTER DR CABRAL REPLACED HIP - ONE LEG IS SHORTER THAN THE OTHER AND SHE NOW HAS TO USE A WALKER. SHE WANTS ADVICE ON WHAT CAN BE DONE TO CORRECT THE ISSUE- OR IF THERE IS ANYTHING TO BE DONE. I ASKED PATIENT TO SCHEDULE APPOINTMENT- SHE PREFER TO SPEAK TO SOMEONE FIRST.     PLEASE ADVISE- THANK YOU

## 2021-07-12 NOTE — DISCHARGE INSTRUCTIONS
Continue using Tylenol extra strength, 2 tablets every 6 hours as needed for pain.    Start Medrol Dosepak tomorrow.    Return if worse.    Please review the medications you are supposed to be taking according to prior physician recommendations. I have not changed your home medications during this visit. If your discharge instructions indicate that I have changed your home medications, this is not the case, and you should disregard. If you have any questions about the medication you should be taking at home, please call your physician.

## 2021-07-12 NOTE — ED PROVIDER NOTES
EMERGENCY DEPARTMENT ENCOUNTER    Pt Name: Sierra Saba  MRN: [id number]  Pt :   1940  Room Number:    Date of encounter:  2021  PCP: Praveen Page MD  ED Provider: Matt Johns MD    Historian: Patient      HPI:  Chief Complaint: Left hip pain        Context: Sierra Saba is a 80 y.o. female who presents to the ED c/o left hip pain which radiates down her left leg.  The pain started 3 days ago after she had been sitting on what she felt was a relatively soft surface for 1 hour.  As she stood up she felt pain radiate down the hip.  She has more pain with ambulation as well as with sitting upright.  She rates her discomfort moderate in severity when she does either of these activities.  If she lies back and puts no pressure on the area she has no discomfort.  She has taken extra strength Tylenol with last dose being at 2 PM.  She denies fevers, chills, neurologic changes in her lower extremities/groin.  She has no history of diabetes.        PAST MEDICAL HISTORY  Past Medical History:   Diagnosis Date   • Hypertension          PAST SURGICAL HISTORY  Past Surgical History:   Procedure Laterality Date   • BACK SURGERY     • CHOLECYSTECTOMY     • HYSTERECTOMY     • TOTAL HIP ARTHROPLASTY           FAMILY HISTORY  History reviewed. No pertinent family history.      SOCIAL HISTORY  Social History     Socioeconomic History   • Marital status:      Spouse name: Not on file   • Number of children: Not on file   • Years of education: Not on file   • Highest education level: Not on file   Tobacco Use   • Smoking status: Former Smoker   Substance and Sexual Activity   • Alcohol use: Not Currently   • Drug use: Never         ALLERGIES  Zithromax [azithromycin] and Penicillins        REVIEW OF SYSTEMS  Review of Systems       All systems reviewed and negative except for those discussed in HPI.       PHYSICAL EXAM    I have reviewed the triage vital signs and nursing notes.    ED Triage Vitals  [07/12/21 1619]   Temp Heart Rate Resp BP SpO2   98.6 °F (37 °C) 94 18 170/92 97 %      Temp src Heart Rate Source Patient Position BP Location FiO2 (%)   Oral Monitor Sitting Right arm --       Physical Exam  GENERAL:   Appears mildly uncomfortable but nontoxic  HENT: Nares patent.  EYES: No scleral icterus.  CV: Regular rhythm, regular rate.  2+ dorsalis pedis pulses  RESPIRATORY: Normal effort.  No audible wheezes, rales or rhonchi.  ABDOMEN: Soft, nontender  MUSCULOSKELETAL: No deformities.  Very reproducible left sciatic notch tenderness to palpation.  NEURO: Alert, moves all extremities, follows commands.  Straight leg raising sign positive on left negative on right  SKIN: Warm, dry, no rash visualized.        LAB RESULTS  Recent Results (from the past 24 hour(s))   Comprehensive Metabolic Panel    Collection Time: 07/12/21  5:41 PM    Specimen: Blood   Result Value Ref Range    Glucose 107 (H) 65 - 99 mg/dL    BUN 24 (H) 8 - 23 mg/dL    Creatinine 0.67 0.57 - 1.00 mg/dL    Sodium 141 136 - 145 mmol/L    Potassium 4.1 3.5 - 5.2 mmol/L    Chloride 103 98 - 107 mmol/L    CO2 23.0 22.0 - 29.0 mmol/L    Calcium 9.9 8.6 - 10.5 mg/dL    Total Protein 7.3 6.0 - 8.5 g/dL    Albumin 4.40 3.50 - 5.20 g/dL    ALT (SGPT) 18 1 - 33 U/L    AST (SGOT) 27 1 - 32 U/L    Alkaline Phosphatase 69 39 - 117 U/L    Total Bilirubin 0.2 0.0 - 1.2 mg/dL    eGFR Non African Amer 85 >60 mL/min/1.73    Globulin 2.9 gm/dL    A/G Ratio 1.5 g/dL    BUN/Creatinine Ratio 35.8 (H) 7.0 - 25.0    Anion Gap 15.0 5.0 - 15.0 mmol/L   CBC Auto Differential    Collection Time: 07/12/21  5:41 PM    Specimen: Blood   Result Value Ref Range    WBC 6.87 3.40 - 10.80 10*3/mm3    RBC 5.18 3.77 - 5.28 10*6/mm3    Hemoglobin 16.3 (H) 12.0 - 15.9 g/dL    Hematocrit 49.3 (H) 34.0 - 46.6 %    MCV 95.2 79.0 - 97.0 fL    MCH 31.5 26.6 - 33.0 pg    MCHC 33.1 31.5 - 35.7 g/dL    RDW 13.7 12.3 - 15.4 %    RDW-SD 48.4 37.0 - 54.0 fl    MPV 9.3 6.0 - 12.0 fL     Platelets 270 140 - 450 10*3/mm3    Neutrophil % 59.9 42.7 - 76.0 %    Lymphocyte % 25.0 19.6 - 45.3 %    Monocyte % 7.9 5.0 - 12.0 %    Eosinophil % 6.0 0.3 - 6.2 %    Basophil % 0.9 0.0 - 1.5 %    Immature Grans % 0.3 0.0 - 0.5 %    Neutrophils, Absolute 4.12 1.70 - 7.00 10*3/mm3    Lymphocytes, Absolute 1.72 0.70 - 3.10 10*3/mm3    Monocytes, Absolute 0.54 0.10 - 0.90 10*3/mm3    Eosinophils, Absolute 0.41 (H) 0.00 - 0.40 10*3/mm3    Basophils, Absolute 0.06 0.00 - 0.20 10*3/mm3    Immature Grans, Absolute 0.02 0.00 - 0.05 10*3/mm3    nRBC 0.0 0.0 - 0.2 /100 WBC   Sedimentation Rate    Collection Time: 07/12/21  5:41 PM    Specimen: Blood   Result Value Ref Range    Sed Rate 13 0 - 30 mm/hr   C-reactive Protein    Collection Time: 07/12/21  5:41 PM    Specimen: Blood   Result Value Ref Range    C-Reactive Protein <0.30 0.00 - 0.50 mg/dL       If labs were ordered, I independently reviewed the results.        RADIOLOGY  XR Chest 1 View    Result Date: 7/12/2021   EXAMINATION: XR CHEST 1 VW-  INDICATION: Hip pain  COMPARISON: 07/13/2020  FINDINGS: Portable chest reveals cardiac and mediastinal silhouettes within normal limits. Underlying chronic and emphysematous changes in with the lung fields bilaterally. No focal parenchymal opacification present. Pulmonary vascularity is within normal limits. No focal right opacification present. Vascular calcification seen of the thoracic aorta with degenerative changes seen within the spine.         Chronic and emphysematous changes identified diffusely throughout the lung fields bilaterally. No evidence of acute parenchymal disease.       XR Hip With or Without Pelvis 2 - 3 View Left    Result Date: 7/12/2021   EXAMINATION: XR HIP W OR WO PELVIS 2-3 VIEW LEFT-  INDICATION: Left hip pain  COMPARISON: 07/13/2020  FINDINGS: Imaging the pelvis and 2 views of the left hip reveal no evidence of fracture or dislocation. Cortex is intact. There is mild spurring of the acetabulum.  Hardware identified within the lower lumbar spine. Hardware is well seen within the right hip.         Minimal degenerative changes in the left hip with no evidence of fracture or dislocation.         I ordered and reviewed the above noted radiographic studies.      I viewed images of left hip radiographs which showed negative per my independent interpretation.    See radiologist's dictation for official interpretation.        PROCEDURES    Procedures    No orders to display       MEDICATIONS GIVEN IN ER    Medications   predniSONE (DELTASONE) tablet 40 mg (40 mg Oral Given 7/12/21 1849)         PROGRESS, DATA ANALYSIS, CONSULTS, AND MEDICAL DECISION MAKING    All labs have been independently reviewed by me.  All radiology studies have been reviewed by me and the radiologist dictating the report.   EKG's have been independently viewed and interpreted by me.        ED Course as of Jul 12 1903   Mon Jul 12, 2021 1902 Labs are reassuring.  Patient given prednisone.  I spoke with her in great detail about my recommendations for sciatica as well as my recommendations for outpatient follow-up.    [MS]      ED Course User Index  [MS] Matt Johns MD             AS OF 19:03 EDT VITALS:    BP - 147/97  HR - 90  TEMP - 98.6 °F (37 °C) (Oral)  O2 SATS - 98%        DIAGNOSIS  Final diagnoses:   Left sided sciatica         DISPOSITION  DISCHARGE    FOLLOW-UP  Praveen Page MD  [address] [address]  [phone]      NEXT AVAILABLE APPOINTMENT - RECHECK OF CONDITION    [facility]  [address] [facility] [address] [phone]    IF YOU HAVE ANY CONCERN OF WORSENING CONDITION         Medication List      New Prescriptions    methylPREDNISolone 4 MG dose pack  Commonly known as: MEDROL  Take as directed on package instructions.           Where to Get Your Medications      These medications were sent to [facility] [address] [phone]  - [phone] [facility] [address]    Phone: [phone]   · methylPREDNISolone 4 MG dose pack                  Matt Johns MD  07/12/21 0471

## 2021-10-20 PROBLEM — J93.9 PNEUMOTHORAX ON LEFT: Status: ACTIVE | Noted: 2021-01-01

## 2021-10-28 PROBLEM — M62.82 NON-TRAUMATIC RHABDOMYOLYSIS: Status: RESOLVED | Noted: 2020-07-13 | Resolved: 2021-01-01

## 2021-10-28 PROBLEM — J93.9 PNEUMOTHORAX ON LEFT: Status: RESOLVED | Noted: 2021-01-01 | Resolved: 2021-01-01

## 2022-01-01 ENCOUNTER — APPOINTMENT (OUTPATIENT)
Dept: NEUROLOGY | Facility: HOSPITAL | Age: 82
End: 2022-01-01

## 2022-01-01 ENCOUNTER — HOSPITAL ENCOUNTER (INPATIENT)
Facility: HOSPITAL | Age: 82
LOS: 12 days | Discharge: NURSING, REHAB OR LONG-TERM CARE | End: 2022-05-24
Attending: EMERGENCY MEDICINE | Admitting: INTERNAL MEDICINE

## 2022-01-01 ENCOUNTER — ANESTHESIA EVENT CONVERTED (OUTPATIENT)
Dept: ANESTHESIOLOGY | Facility: HOSPITAL | Age: 82
End: 2022-01-01

## 2022-01-01 ENCOUNTER — APPOINTMENT (OUTPATIENT)
Dept: GENERAL RADIOLOGY | Facility: HOSPITAL | Age: 82
End: 2022-01-01

## 2022-01-01 ENCOUNTER — ANESTHESIA EVENT (OUTPATIENT)
Dept: TELEMETRY | Facility: HOSPITAL | Age: 82
End: 2022-01-01

## 2022-01-01 ENCOUNTER — HOSPITAL ENCOUNTER (EMERGENCY)
Facility: HOSPITAL | Age: 82
Discharge: NURSING, REHAB OR LONG-TERM CARE | End: 2022-07-06
Attending: EMERGENCY MEDICINE | Admitting: EMERGENCY MEDICINE

## 2022-01-01 ENCOUNTER — HOSPITAL ENCOUNTER (INPATIENT)
Facility: HOSPITAL | Age: 82
LOS: 8 days | Discharge: NURSING, REHAB OR LONG-TERM CARE | End: 2022-03-02
Attending: EMERGENCY MEDICINE | Admitting: HOSPITALIST

## 2022-01-01 ENCOUNTER — APPOINTMENT (OUTPATIENT)
Dept: CARDIOLOGY | Facility: HOSPITAL | Age: 82
End: 2022-01-01

## 2022-01-01 ENCOUNTER — HOME CARE VISIT (OUTPATIENT)
Dept: HOME HEALTH SERVICES | Facility: HOME HEALTHCARE | Age: 82
End: 2022-01-01

## 2022-01-01 ENCOUNTER — TRANSCRIBE ORDERS (OUTPATIENT)
Dept: HOME HEALTH SERVICES | Facility: HOME HEALTHCARE | Age: 82
End: 2022-01-01

## 2022-01-01 ENCOUNTER — TRANSCRIBE ORDERS (OUTPATIENT)
Dept: ADMINISTRATIVE | Facility: HOSPITAL | Age: 82
End: 2022-01-01

## 2022-01-01 ENCOUNTER — HOSPITAL ENCOUNTER (INPATIENT)
Facility: HOSPITAL | Age: 82
LOS: 4 days | End: 2022-07-12
Attending: EMERGENCY MEDICINE | Admitting: INTERNAL MEDICINE

## 2022-01-01 ENCOUNTER — ANESTHESIA (OUTPATIENT)
Dept: ANESTHESIOLOGY | Facility: HOSPITAL | Age: 82
End: 2022-01-01

## 2022-01-01 ENCOUNTER — TELEPHONE (OUTPATIENT)
Dept: UROLOGY | Facility: CLINIC | Age: 82
End: 2022-01-01

## 2022-01-01 ENCOUNTER — APPOINTMENT (OUTPATIENT)
Dept: CT IMAGING | Facility: HOSPITAL | Age: 82
End: 2022-01-01

## 2022-01-01 ENCOUNTER — HOSPITAL ENCOUNTER (OUTPATIENT)
Dept: GENERAL RADIOLOGY | Facility: HOSPITAL | Age: 82
Discharge: HOME | End: 2022-05-05
Admitting: FAMILY MEDICINE

## 2022-01-01 ENCOUNTER — ANESTHESIA EVENT (OUTPATIENT)
Dept: PERIOP | Facility: HOSPITAL | Age: 82
End: 2022-01-01

## 2022-01-01 ENCOUNTER — APPOINTMENT (OUTPATIENT)
Dept: ULTRASOUND IMAGING | Facility: HOSPITAL | Age: 82
End: 2022-01-01

## 2022-01-01 ENCOUNTER — ANESTHESIA (OUTPATIENT)
Dept: PERIOP | Facility: HOSPITAL | Age: 82
End: 2022-01-01

## 2022-01-01 ENCOUNTER — ANESTHESIA EVENT (OUTPATIENT)
Dept: ANESTHESIOLOGY | Facility: HOSPITAL | Age: 82
End: 2022-01-01

## 2022-01-01 ENCOUNTER — HOME HEALTH ADMISSION (OUTPATIENT)
Dept: HOME HEALTH SERVICES | Facility: HOME HEALTHCARE | Age: 82
End: 2022-01-01

## 2022-01-01 ENCOUNTER — TELEPHONE (OUTPATIENT)
Dept: PULMONOLOGY | Facility: CLINIC | Age: 82
End: 2022-01-01

## 2022-01-01 ENCOUNTER — ANESTHESIA (OUTPATIENT)
Dept: TELEMETRY | Facility: HOSPITAL | Age: 82
End: 2022-01-01

## 2022-01-01 VITALS
OXYGEN SATURATION: 92 % | SYSTOLIC BLOOD PRESSURE: 105 MMHG | DIASTOLIC BLOOD PRESSURE: 60 MMHG | TEMPERATURE: 97.7 F | RESPIRATION RATE: 22 BRPM | HEART RATE: 113 BPM

## 2022-01-01 VITALS
TEMPERATURE: 97.9 F | OXYGEN SATURATION: 92 % | SYSTOLIC BLOOD PRESSURE: 114 MMHG | WEIGHT: 114.2 LBS | RESPIRATION RATE: 16 BRPM | DIASTOLIC BLOOD PRESSURE: 66 MMHG | HEIGHT: 65 IN | HEART RATE: 89 BPM | BODY MASS INDEX: 19.03 KG/M2

## 2022-01-01 VITALS
BODY MASS INDEX: 20.49 KG/M2 | SYSTOLIC BLOOD PRESSURE: 128 MMHG | DIASTOLIC BLOOD PRESSURE: 67 MMHG | OXYGEN SATURATION: 98 % | TEMPERATURE: 97.9 F | RESPIRATION RATE: 18 BRPM | HEIGHT: 65 IN | WEIGHT: 123 LBS | HEART RATE: 86 BPM

## 2022-01-01 VITALS
OXYGEN SATURATION: 81 % | HEART RATE: 120 BPM | RESPIRATION RATE: 36 BRPM | TEMPERATURE: 97.7 F | DIASTOLIC BLOOD PRESSURE: 69 MMHG | SYSTOLIC BLOOD PRESSURE: 140 MMHG

## 2022-01-01 VITALS
OXYGEN SATURATION: 98 % | WEIGHT: 135 LBS | SYSTOLIC BLOOD PRESSURE: 160 MMHG | HEIGHT: 65 IN | BODY MASS INDEX: 22.49 KG/M2 | TEMPERATURE: 98.6 F | HEART RATE: 100 BPM | RESPIRATION RATE: 20 BRPM | DIASTOLIC BLOOD PRESSURE: 81 MMHG

## 2022-01-01 VITALS
OXYGEN SATURATION: 97 % | DIASTOLIC BLOOD PRESSURE: 81 MMHG | BODY MASS INDEX: 23.16 KG/M2 | HEIGHT: 65 IN | TEMPERATURE: 98.4 F | RESPIRATION RATE: 32 BRPM | WEIGHT: 139 LBS | SYSTOLIC BLOOD PRESSURE: 145 MMHG | HEART RATE: 115 BPM

## 2022-01-01 DIAGNOSIS — Z87.09 HISTORY OF COPD: ICD-10-CM

## 2022-01-01 DIAGNOSIS — J42 CHRONIC BRONCHITIS, UNSPECIFIED CHRONIC BRONCHITIS TYPE: ICD-10-CM

## 2022-01-01 DIAGNOSIS — E87.6 HYPOKALEMIA: ICD-10-CM

## 2022-01-01 DIAGNOSIS — J96.01 ACUTE RESPIRATORY FAILURE WITH HYPOXIA AND HYPERCAPNIA: ICD-10-CM

## 2022-01-01 DIAGNOSIS — R07.89 CHEST TIGHTNESS: ICD-10-CM

## 2022-01-01 DIAGNOSIS — J44.1 ACUTE EXACERBATION OF CHRONIC OBSTRUCTIVE PULMONARY DISEASE (COPD): ICD-10-CM

## 2022-01-01 DIAGNOSIS — S72.002A CLOSED LEFT HIP FRACTURE, INITIAL ENCOUNTER: Primary | ICD-10-CM

## 2022-01-01 DIAGNOSIS — J43.9 EMPHYSEMATOUS BLEB OF LUNG: ICD-10-CM

## 2022-01-01 DIAGNOSIS — J18.9 UNRESOLVED PNEUMONIA: Primary | ICD-10-CM

## 2022-01-01 DIAGNOSIS — R05.9 COUGH: ICD-10-CM

## 2022-01-01 DIAGNOSIS — F41.1 GAD (GENERALIZED ANXIETY DISORDER): ICD-10-CM

## 2022-01-01 DIAGNOSIS — R91.8 RIGHT UPPER LOBE PULMONARY INFILTRATE: ICD-10-CM

## 2022-01-01 DIAGNOSIS — R53.1 GENERALIZED WEAKNESS: ICD-10-CM

## 2022-01-01 DIAGNOSIS — N28.89 LEFT RENAL MASS: ICD-10-CM

## 2022-01-01 DIAGNOSIS — Z87.09 HISTORY OF EMPHYSEMA: ICD-10-CM

## 2022-01-01 DIAGNOSIS — J96.01 ACUTE HYPOXEMIC RESPIRATORY FAILURE: Primary | ICD-10-CM

## 2022-01-01 DIAGNOSIS — Z87.891 FORMER SMOKER: ICD-10-CM

## 2022-01-01 DIAGNOSIS — Z74.09 IMPAIRED FUNCTIONAL MOBILITY, BALANCE, GAIT, AND ENDURANCE: ICD-10-CM

## 2022-01-01 DIAGNOSIS — R09.02 HYPOXIA: Primary | ICD-10-CM

## 2022-01-01 DIAGNOSIS — E87.6 HYPOKALEMIA: Primary | ICD-10-CM

## 2022-01-01 DIAGNOSIS — R13.12 OROPHARYNGEAL DYSPHAGIA: ICD-10-CM

## 2022-01-01 DIAGNOSIS — Z87.09 HISTORY OF PNEUMOTHORAX: ICD-10-CM

## 2022-01-01 DIAGNOSIS — Z01.812 BLOOD TESTS PRIOR TO TREATMENT OR PROCEDURE: Primary | ICD-10-CM

## 2022-01-01 DIAGNOSIS — J18.9 PNEUMONIA OF RIGHT LOWER LOBE DUE TO INFECTIOUS ORGANISM: Primary | ICD-10-CM

## 2022-01-01 DIAGNOSIS — J44.9 CHRONIC OBSTRUCTIVE PULMONARY DISEASE, UNSPECIFIED COPD TYPE: Primary | ICD-10-CM

## 2022-01-01 DIAGNOSIS — J96.02 ACUTE RESPIRATORY FAILURE WITH HYPOXIA AND HYPERCAPNIA: ICD-10-CM

## 2022-01-01 DIAGNOSIS — J18.9 MULTIFOCAL PNEUMONIA: ICD-10-CM

## 2022-01-01 LAB
ABO GROUP BLD: NORMAL
ALBUMIN SERPL-MCNC: 2.8 G/DL (ref 3.5–5.2)
ALBUMIN SERPL-MCNC: 3 G/DL (ref 3.5–5.2)
ALBUMIN SERPL-MCNC: 3.2 G/DL (ref 3.5–5.2)
ALBUMIN SERPL-MCNC: 3.2 G/DL (ref 3.5–5.2)
ALBUMIN SERPL-MCNC: 3.4 G/DL (ref 3.5–5.2)
ALBUMIN SERPL-MCNC: 3.7 G/DL (ref 3.5–5.2)
ALBUMIN SERPL-MCNC: 3.8 G/DL (ref 3.5–5.2)
ALBUMIN SERPL-MCNC: 4.4 G/DL (ref 3.5–5.2)
ALBUMIN/GLOB SERPL: 1 G/DL
ALBUMIN/GLOB SERPL: 1.3 G/DL
ALBUMIN/GLOB SERPL: 1.4 G/DL
ALBUMIN/GLOB SERPL: 1.7 G/DL
ALBUMIN/GLOB SERPL: 1.8 G/DL
ALP SERPL-CCNC: 102 U/L (ref 39–117)
ALP SERPL-CCNC: 125 U/L (ref 39–117)
ALP SERPL-CCNC: 129 U/L (ref 39–117)
ALP SERPL-CCNC: 198 U/L (ref 39–117)
ALP SERPL-CCNC: 72 U/L (ref 39–117)
ALP SERPL-CCNC: 75 U/L (ref 39–117)
ALT SERPL W P-5'-P-CCNC: 12 U/L (ref 1–33)
ALT SERPL W P-5'-P-CCNC: 1278 U/L (ref 1–33)
ALT SERPL W P-5'-P-CCNC: 16 U/L (ref 1–33)
ALT SERPL W P-5'-P-CCNC: 187 U/L (ref 1–33)
ALT SERPL W P-5'-P-CCNC: 193 U/L (ref 1–33)
ALT SERPL W P-5'-P-CCNC: 213 U/L (ref 1–33)
ALT SERPL W P-5'-P-CCNC: 3150 U/L (ref 1–33)
ALT SERPL W P-5'-P-CCNC: 816 U/L (ref 1–33)
ANION GAP SERPL CALCULATED.3IONS-SCNC: 10 MMOL/L (ref 5–15)
ANION GAP SERPL CALCULATED.3IONS-SCNC: 11 MMOL/L (ref 5–15)
ANION GAP SERPL CALCULATED.3IONS-SCNC: 12 MMOL/L (ref 5–15)
ANION GAP SERPL CALCULATED.3IONS-SCNC: 13 MMOL/L (ref 5–15)
ANION GAP SERPL CALCULATED.3IONS-SCNC: 17 MMOL/L (ref 5–15)
ANION GAP SERPL CALCULATED.3IONS-SCNC: 33 MMOL/L (ref 5–15)
ANION GAP SERPL CALCULATED.3IONS-SCNC: 8 MMOL/L (ref 5–15)
ANION GAP SERPL CALCULATED.3IONS-SCNC: 9 MMOL/L (ref 5–15)
APTT PPP: 27.1 SECONDS (ref 22–39)
ARTERIAL PATENCY WRIST A: ABNORMAL
ARTERIAL PATENCY WRIST A: POSITIVE
AST SERPL-CCNC: 102 U/L (ref 1–32)
AST SERPL-CCNC: 14 U/L (ref 1–32)
AST SERPL-CCNC: 22 U/L (ref 1–32)
AST SERPL-CCNC: 2760 U/L (ref 1–32)
AST SERPL-CCNC: 317 U/L (ref 1–32)
AST SERPL-CCNC: 622 U/L (ref 1–32)
AST SERPL-CCNC: 84 U/L (ref 1–32)
AST SERPL-CCNC: 96 U/L (ref 1–32)
ATMOSPHERIC PRESS: ABNORMAL MM[HG]
B PARAPERT DNA SPEC QL NAA+PROBE: NOT DETECTED
B PARAPERT DNA SPEC QL NAA+PROBE: NOT DETECTED
B PERT DNA SPEC QL NAA+PROBE: NOT DETECTED
B PERT DNA SPEC QL NAA+PROBE: NOT DETECTED
BACTERIA SPEC AEROBE CULT: NORMAL
BACTERIA SPEC RESP CULT: NORMAL
BACTERIA UR QL AUTO: ABNORMAL /HPF
BASE EXCESS BLDA CALC-SCNC: -4 MMOL/L (ref 0–2)
BASE EXCESS BLDA CALC-SCNC: 21.5 MMOL/L (ref 0–2)
BASE EXCESS BLDA CALC-SCNC: 27.8 MMOL/L (ref 0–2)
BASE EXCESS BLDA CALC-SCNC: 4.3 MMOL/L (ref 0–2)
BASE EXCESS BLDA CALC-SCNC: 5 MMOL/L (ref 0–2)
BASOPHILS # BLD AUTO: 0.01 10*3/MM3 (ref 0–0.2)
BASOPHILS # BLD AUTO: 0.01 10*3/MM3 (ref 0–0.2)
BASOPHILS # BLD AUTO: 0.02 10*3/MM3 (ref 0–0.2)
BASOPHILS # BLD AUTO: 0.03 10*3/MM3 (ref 0–0.2)
BASOPHILS # BLD AUTO: 0.04 10*3/MM3 (ref 0–0.2)
BASOPHILS # BLD AUTO: 0.05 10*3/MM3 (ref 0–0.2)
BASOPHILS # BLD AUTO: 0.06 10*3/MM3 (ref 0–0.2)
BASOPHILS # BLD MANUAL: 0 10*3/MM3 (ref 0–0.2)
BASOPHILS NFR BLD AUTO: 0.3 % (ref 0–1.5)
BASOPHILS NFR BLD AUTO: 0.4 % (ref 0–1.5)
BASOPHILS NFR BLD AUTO: 0.6 % (ref 0–1.5)
BASOPHILS NFR BLD AUTO: 0.7 % (ref 0–1.5)
BASOPHILS NFR BLD AUTO: 0.7 % (ref 0–1.5)
BASOPHILS NFR BLD AUTO: 0.8 % (ref 0–1.5)
BASOPHILS NFR BLD AUTO: 0.9 % (ref 0–1.5)
BASOPHILS NFR BLD MANUAL: 0 % (ref 0–1.5)
BDY SITE: ABNORMAL
BH BB BLOOD EXPIRATION DATE: NORMAL
BH BB BLOOD EXPIRATION DATE: NORMAL
BH BB BLOOD TYPE BARCODE: 5100
BH BB BLOOD TYPE BARCODE: 6200
BH BB DISPENSE STATUS: NORMAL
BH BB DISPENSE STATUS: NORMAL
BH BB PRODUCT CODE: NORMAL
BH BB PRODUCT CODE: NORMAL
BH BB UNIT NUMBER: NORMAL
BH BB UNIT NUMBER: NORMAL
BH CV ECHO MEAS - AO MAX PG: 8.9 MMHG
BH CV ECHO MEAS - AO MEAN PG: 4 MMHG
BH CV ECHO MEAS - AO ROOT DIAM: 3 CM
BH CV ECHO MEAS - AO V2 MAX: 149 CM/SEC
BH CV ECHO MEAS - AO V2 VTI: 22.2 CM
BH CV ECHO MEAS - AVA(I,D): 2.15 CM2
BH CV ECHO MEAS - EDV(CUBED): 98.6 ML
BH CV ECHO MEAS - EDV(MOD-SP2): 60 ML
BH CV ECHO MEAS - EDV(MOD-SP4): 63 ML
BH CV ECHO MEAS - EF(MOD-SP2): 65 %
BH CV ECHO MEAS - EF(MOD-SP4): 34.9 %
BH CV ECHO MEAS - ESV(CUBED): 18.6 ML
BH CV ECHO MEAS - ESV(MOD-SP2): 21 ML
BH CV ECHO MEAS - ESV(MOD-SP4): 41 ML
BH CV ECHO MEAS - FS: 42.6 %
BH CV ECHO MEAS - IVS/LVPW: 0.91 CM
BH CV ECHO MEAS - IVSD: 0.94 CM
BH CV ECHO MEAS - LA DIMENSION: 3.3 CM
BH CV ECHO MEAS - LV MASS(C)D: 156.6 GRAMS
BH CV ECHO MEAS - LV MAX PG: 4.5 MMHG
BH CV ECHO MEAS - LV MEAN PG: 2 MMHG
BH CV ECHO MEAS - LV V1 MAX: 106 CM/SEC
BH CV ECHO MEAS - LV V1 VTI: 16.8 CM
BH CV ECHO MEAS - LVIDD: 4.6 CM
BH CV ECHO MEAS - LVIDS: 2.7 CM
BH CV ECHO MEAS - LVOT AREA: 2.8 CM2
BH CV ECHO MEAS - LVOT DIAM: 1.9 CM
BH CV ECHO MEAS - LVPWD: 1.03 CM
BH CV ECHO MEAS - MED PEAK E' VEL: 5 CM/SEC
BH CV ECHO MEAS - MV A DUR: 4 SEC
BH CV ECHO MEAS - MV A MAX VEL: 98.2 CM/SEC
BH CV ECHO MEAS - MV DEC SLOPE: 777 CM/SEC2
BH CV ECHO MEAS - MV DEC TIME: 0.08 MSEC
BH CV ECHO MEAS - MV E MAX VEL: 56.3 CM/SEC
BH CV ECHO MEAS - MV E/A: 0.57
BH CV ECHO MEAS - MV MAX PG: 6.9 MMHG
BH CV ECHO MEAS - MV MEAN PG: 3 MMHG
BH CV ECHO MEAS - MV P1/2T: 41.1 MSEC
BH CV ECHO MEAS - MV V2 VTI: 17.2 CM
BH CV ECHO MEAS - MVA(P1/2T): 5.4 CM2
BH CV ECHO MEAS - MVA(VTI): 2.8 CM2
BH CV ECHO MEAS - PA ACC SLOPE: 327 CM/SEC2
BH CV ECHO MEAS - PA ACC TIME: 0.14 SEC
BH CV ECHO MEAS - PA PR(ACCEL): 17.3 MMHG
BH CV ECHO MEAS - RAP SYSTOLE: 3 MMHG
BH CV ECHO MEAS - RVSP: 45 MMHG
BH CV ECHO MEAS - SV(LVOT): 47.6 ML
BH CV ECHO MEAS - SV(MOD-SP2): 39 ML
BH CV ECHO MEAS - SV(MOD-SP4): 22 ML
BH CV ECHO MEAS - TAPSE (>1.6): 1.5 CM
BH CV ECHO MEAS - TR MAX PG: 42.4 MMHG
BH CV ECHO MEAS - TR MAX VEL: 324.8 CM/SEC
BH CV VAS BP RIGHT ARM: NORMAL MMHG
BH CV XLRA - RV BASE: 2.8 CM
BH CV XLRA - RV LENGTH: 7 CM
BH CV XLRA - RV MID: 2.1 CM
BH CV XLRA - TDI S': 10.4 CM/SEC
BILIRUB CONJ SERPL-MCNC: 0.9 MG/DL (ref 0–0.3)
BILIRUB INDIRECT SERPL-MCNC: 0.8 MG/DL
BILIRUB SERPL-MCNC: 0.4 MG/DL (ref 0–1.2)
BILIRUB SERPL-MCNC: 0.4 MG/DL (ref 0–1.2)
BILIRUB SERPL-MCNC: 1 MG/DL (ref 0–1.2)
BILIRUB SERPL-MCNC: 1.5 MG/DL (ref 0–1.2)
BILIRUB SERPL-MCNC: 1.7 MG/DL (ref 0–1.2)
BILIRUB SERPL-MCNC: 1.8 MG/DL (ref 0–1.2)
BILIRUB SERPL-MCNC: 2 MG/DL (ref 0–1.2)
BILIRUB SERPL-MCNC: 3.3 MG/DL (ref 0–1.2)
BILIRUB UR QL STRIP: NEGATIVE
BILIRUB UR QL STRIP: NEGATIVE
BLD GP AB SCN SERPL QL: NEGATIVE
BODY TEMPERATURE: 37 C
BUN SERPL-MCNC: 10 MG/DL (ref 8–23)
BUN SERPL-MCNC: 11 MG/DL (ref 8–23)
BUN SERPL-MCNC: 12 MG/DL (ref 8–23)
BUN SERPL-MCNC: 15 MG/DL (ref 8–23)
BUN SERPL-MCNC: 15 MG/DL (ref 8–23)
BUN SERPL-MCNC: 17 MG/DL (ref 8–23)
BUN SERPL-MCNC: 20 MG/DL (ref 8–23)
BUN SERPL-MCNC: 22 MG/DL (ref 8–23)
BUN SERPL-MCNC: 22 MG/DL (ref 8–23)
BUN SERPL-MCNC: 23 MG/DL (ref 8–23)
BUN SERPL-MCNC: 25 MG/DL (ref 8–23)
BUN SERPL-MCNC: 31 MG/DL (ref 8–23)
BUN SERPL-MCNC: 35 MG/DL (ref 8–23)
BUN SERPL-MCNC: 35 MG/DL (ref 8–23)
BUN SERPL-MCNC: 36 MG/DL (ref 8–23)
BUN/CREAT SERPL: 20.7 (ref 7–25)
BUN/CREAT SERPL: 21.1 (ref 7–25)
BUN/CREAT SERPL: 22.4 (ref 7–25)
BUN/CREAT SERPL: 22.9 (ref 7–25)
BUN/CREAT SERPL: 24.4 (ref 7–25)
BUN/CREAT SERPL: 30 (ref 7–25)
BUN/CREAT SERPL: 31.9 (ref 7–25)
BUN/CREAT SERPL: 33.3 (ref 7–25)
BUN/CREAT SERPL: 36.1 (ref 7–25)
BUN/CREAT SERPL: 36.2 (ref 7–25)
BUN/CREAT SERPL: 36.5 (ref 7–25)
BUN/CREAT SERPL: 40 (ref 7–25)
BUN/CREAT SERPL: 42.7 (ref 7–25)
BUN/CREAT SERPL: 47 (ref 7–25)
BUN/CREAT SERPL: 47.9 (ref 7–25)
BURR CELLS BLD QL SMEAR: ABNORMAL
C PNEUM DNA NPH QL NAA+NON-PROBE: NOT DETECTED
C PNEUM DNA NPH QL NAA+NON-PROBE: NOT DETECTED
CALCIUM SPEC-SCNC: 10 MG/DL (ref 8.6–10.5)
CALCIUM SPEC-SCNC: 8.6 MG/DL (ref 8.6–10.5)
CALCIUM SPEC-SCNC: 8.7 MG/DL (ref 8.6–10.5)
CALCIUM SPEC-SCNC: 8.7 MG/DL (ref 8.6–10.5)
CALCIUM SPEC-SCNC: 8.8 MG/DL (ref 8.6–10.5)
CALCIUM SPEC-SCNC: 9 MG/DL (ref 8.6–10.5)
CALCIUM SPEC-SCNC: 9 MG/DL (ref 8.6–10.5)
CALCIUM SPEC-SCNC: 9.1 MG/DL (ref 8.6–10.5)
CALCIUM SPEC-SCNC: 9.1 MG/DL (ref 8.6–10.5)
CALCIUM SPEC-SCNC: 9.4 MG/DL (ref 8.6–10.5)
CALCIUM SPEC-SCNC: 9.4 MG/DL (ref 8.6–10.5)
CALCIUM SPEC-SCNC: 9.6 MG/DL (ref 8.6–10.5)
CALCIUM SPEC-SCNC: 9.7 MG/DL (ref 8.6–10.5)
CHLORIDE SERPL-SCNC: 103 MMOL/L (ref 98–107)
CHLORIDE SERPL-SCNC: 104 MMOL/L (ref 98–107)
CHLORIDE SERPL-SCNC: 105 MMOL/L (ref 98–107)
CHLORIDE SERPL-SCNC: 105 MMOL/L (ref 98–107)
CHLORIDE SERPL-SCNC: 106 MMOL/L (ref 98–107)
CHLORIDE SERPL-SCNC: 107 MMOL/L (ref 98–107)
CHLORIDE SERPL-SCNC: 109 MMOL/L (ref 98–107)
CHLORIDE SERPL-SCNC: 88 MMOL/L (ref 98–107)
CHLORIDE SERPL-SCNC: 89 MMOL/L (ref 98–107)
CHLORIDE SERPL-SCNC: 91 MMOL/L (ref 98–107)
CHLORIDE SERPL-SCNC: 93 MMOL/L (ref 98–107)
CHLORIDE SERPL-SCNC: 94 MMOL/L (ref 98–107)
CHLORIDE SERPL-SCNC: 96 MMOL/L (ref 98–107)
CHLORIDE SERPL-SCNC: 96 MMOL/L (ref 98–107)
CHLORIDE SERPL-SCNC: 97 MMOL/L (ref 98–107)
CK SERPL-CCNC: 109 U/L (ref 20–180)
CLARITY UR: CLEAR
CLARITY UR: CLEAR
CO2 BLDA-SCNC: 20.3 MMOL/L (ref 22–33)
CO2 BLDA-SCNC: 32.4 MMOL/L (ref 22–33)
CO2 BLDA-SCNC: 33.5 MMOL/L (ref 22–33)
CO2 BLDA-SCNC: 51.4 MMOL/L (ref 22–33)
CO2 BLDA-SCNC: 53.8 MMOL/L (ref 22–33)
CO2 SERPL-SCNC: 20 MMOL/L (ref 22–29)
CO2 SERPL-SCNC: 25 MMOL/L (ref 22–29)
CO2 SERPL-SCNC: 26 MMOL/L (ref 22–29)
CO2 SERPL-SCNC: 28 MMOL/L (ref 22–29)
CO2 SERPL-SCNC: 28 MMOL/L (ref 22–29)
CO2 SERPL-SCNC: 30 MMOL/L (ref 22–29)
CO2 SERPL-SCNC: 39 MMOL/L (ref 22–29)
CO2 SERPL-SCNC: 42 MMOL/L (ref 22–29)
CO2 SERPL-SCNC: 42 MMOL/L (ref 22–29)
CO2 SERPL-SCNC: 43 MMOL/L (ref 22–29)
CO2 SERPL-SCNC: 43 MMOL/L (ref 22–29)
CO2 SERPL-SCNC: 44 MMOL/L (ref 22–29)
CO2 SERPL-SCNC: 44 MMOL/L (ref 22–29)
COHGB MFR BLD: 1 % (ref 0–2)
COHGB MFR BLD: 1.1 % (ref 0–2)
COHGB MFR BLD: 1.2 % (ref 0–2)
COHGB MFR BLD: 1.2 % (ref 0–2)
COHGB MFR BLD: 2.2 % (ref 0–2)
COLOR UR: YELLOW
COLOR UR: YELLOW
CREAT SERPL-MCNC: 0.41 MG/DL (ref 0.57–1)
CREAT SERPL-MCNC: 0.48 MG/DL (ref 0.57–1)
CREAT SERPL-MCNC: 0.5 MG/DL (ref 0.57–1)
CREAT SERPL-MCNC: 0.58 MG/DL (ref 0.57–1)
CREAT SERPL-MCNC: 0.6 MG/DL (ref 0.57–1)
CREAT SERPL-MCNC: 0.61 MG/DL (ref 0.57–1)
CREAT SERPL-MCNC: 0.63 MG/DL (ref 0.57–1)
CREAT SERPL-MCNC: 0.66 MG/DL (ref 0.57–1)
CREAT SERPL-MCNC: 0.69 MG/DL (ref 0.57–1)
CREAT SERPL-MCNC: 0.69 MG/DL (ref 0.57–1)
CREAT SERPL-MCNC: 0.71 MG/DL (ref 0.57–1)
CREAT SERPL-MCNC: 0.73 MG/DL (ref 0.57–1)
CREAT SERPL-MCNC: 0.76 MG/DL (ref 0.57–1)
CREAT SERPL-MCNC: 0.82 MG/DL (ref 0.57–1)
CREAT SERPL-MCNC: 0.9 MG/DL (ref 0.57–1)
CRP SERPL-MCNC: 0.3 MG/DL (ref 0–0.5)
CRP SERPL-MCNC: 6.1 MG/DL (ref 0–0.5)
CYTOLOGIST CVX/VAG CYTO: NORMAL
CYTOLOGIST CVX/VAG CYTO: NORMAL
D DIMER PPP FEU-MCNC: 1.31 MCGFEU/ML (ref 0.01–0.5)
D DIMER PPP FEU-MCNC: 3.85 MCGFEU/ML (ref 0.01–0.5)
D-LACTATE SERPL-SCNC: 1 MMOL/L (ref 0.5–2)
D-LACTATE SERPL-SCNC: 1.7 MMOL/L (ref 0.5–2)
D-LACTATE SERPL-SCNC: 2 MMOL/L (ref 0.5–2)
D-LACTATE SERPL-SCNC: 2.3 MMOL/L (ref 0.5–2)
D-LACTATE SERPL-SCNC: 2.4 MMOL/L (ref 0.5–2)
DEPRECATED RDW RBC AUTO: 46.4 FL (ref 37–54)
DEPRECATED RDW RBC AUTO: 46.5 FL (ref 37–54)
DEPRECATED RDW RBC AUTO: 46.7 FL (ref 37–54)
DEPRECATED RDW RBC AUTO: 46.7 FL (ref 37–54)
DEPRECATED RDW RBC AUTO: 47.1 FL (ref 37–54)
DEPRECATED RDW RBC AUTO: 47.6 FL (ref 37–54)
DEPRECATED RDW RBC AUTO: 48.9 FL (ref 37–54)
DEPRECATED RDW RBC AUTO: 50.5 FL (ref 37–54)
DEPRECATED RDW RBC AUTO: 50.7 FL (ref 37–54)
DEPRECATED RDW RBC AUTO: 51.7 FL (ref 37–54)
DEPRECATED RDW RBC AUTO: 52.2 FL (ref 37–54)
DEPRECATED RDW RBC AUTO: 53.1 FL (ref 37–54)
DEPRECATED RDW RBC AUTO: 53.9 FL (ref 37–54)
DEPRECATED RDW RBC AUTO: 54 FL (ref 37–54)
DEPRECATED RDW RBC AUTO: 55.7 FL (ref 37–54)
DEPRECATED RDW RBC AUTO: 55.8 FL (ref 37–54)
DEPRECATED RDW RBC AUTO: 57.2 FL (ref 37–54)
EGFRCR SERPLBLD CKD-EPI 2021: 64.8 ML/MIN/1.73
EGFRCR SERPLBLD CKD-EPI 2021: 72.4 ML/MIN/1.73
EGFRCR SERPLBLD CKD-EPI 2021: 83.3 ML/MIN/1.73
EGFRCR SERPLBLD CKD-EPI 2021: 87.9 ML/MIN/1.73
EGFRCR SERPLBLD CKD-EPI 2021: 87.9 ML/MIN/1.73
EGFRCR SERPLBLD CKD-EPI 2021: 88.8 ML/MIN/1.73
EGFRCR SERPLBLD CKD-EPI 2021: 89.8 ML/MIN/1.73
EGFRCR SERPLBLD CKD-EPI 2021: 90.5 ML/MIN/1.73
EGFRCR SERPLBLD CKD-EPI 2021: 95.9 ML/MIN/1.73
EGFRCR SERPLBLD CKD-EPI 2021: 99.6 ML/MIN/1.73
EOSINOPHIL # BLD AUTO: 0 10*3/MM3 (ref 0–0.4)
EOSINOPHIL # BLD AUTO: 0.01 10*3/MM3 (ref 0–0.4)
EOSINOPHIL # BLD AUTO: 0.02 10*3/MM3 (ref 0–0.4)
EOSINOPHIL # BLD AUTO: 0.27 10*3/MM3 (ref 0–0.4)
EOSINOPHIL # BLD AUTO: 0.61 10*3/MM3 (ref 0–0.4)
EOSINOPHIL # BLD AUTO: 1.05 10*3/MM3 (ref 0–0.4)
EOSINOPHIL # BLD AUTO: 1.63 10*3/MM3 (ref 0–0.4)
EOSINOPHIL # BLD MANUAL: 0 10*3/MM3 (ref 0–0.4)
EOSINOPHIL # BLD MANUAL: 0.04 10*3/MM3 (ref 0–0.4)
EOSINOPHIL # BLD MANUAL: 0.12 10*3/MM3 (ref 0–0.4)
EOSINOPHIL NFR BLD AUTO: 0 % (ref 0.3–6.2)
EOSINOPHIL NFR BLD AUTO: 0.1 % (ref 0.3–6.2)
EOSINOPHIL NFR BLD AUTO: 0.2 % (ref 0.3–6.2)
EOSINOPHIL NFR BLD AUTO: 0.2 % (ref 0.3–6.2)
EOSINOPHIL NFR BLD AUTO: 0.3 % (ref 0.3–6.2)
EOSINOPHIL NFR BLD AUTO: 13.8 % (ref 0.3–6.2)
EOSINOPHIL NFR BLD AUTO: 23.6 % (ref 0.3–6.2)
EOSINOPHIL NFR BLD AUTO: 4.9 % (ref 0.3–6.2)
EOSINOPHIL NFR BLD AUTO: 8.1 % (ref 0.3–6.2)
EOSINOPHIL NFR BLD MANUAL: 0 % (ref 0.3–6.2)
EOSINOPHIL NFR BLD MANUAL: 1 % (ref 0.3–6.2)
EOSINOPHIL NFR BLD MANUAL: 1 % (ref 0.3–6.2)
EPAP: 0
ERYTHROCYTE [DISTWIDTH] IN BLOOD BY AUTOMATED COUNT: 13.6 % (ref 12.3–15.4)
ERYTHROCYTE [DISTWIDTH] IN BLOOD BY AUTOMATED COUNT: 13.6 % (ref 12.3–15.4)
ERYTHROCYTE [DISTWIDTH] IN BLOOD BY AUTOMATED COUNT: 13.7 % (ref 12.3–15.4)
ERYTHROCYTE [DISTWIDTH] IN BLOOD BY AUTOMATED COUNT: 13.7 % (ref 12.3–15.4)
ERYTHROCYTE [DISTWIDTH] IN BLOOD BY AUTOMATED COUNT: 13.8 % (ref 12.3–15.4)
ERYTHROCYTE [DISTWIDTH] IN BLOOD BY AUTOMATED COUNT: 14.6 % (ref 12.3–15.4)
ERYTHROCYTE [DISTWIDTH] IN BLOOD BY AUTOMATED COUNT: 14.9 % (ref 12.3–15.4)
ERYTHROCYTE [DISTWIDTH] IN BLOOD BY AUTOMATED COUNT: 15 % (ref 12.3–15.4)
ERYTHROCYTE [DISTWIDTH] IN BLOOD BY AUTOMATED COUNT: 15.1 % (ref 12.3–15.4)
ERYTHROCYTE [DISTWIDTH] IN BLOOD BY AUTOMATED COUNT: 15.1 % (ref 12.3–15.4)
ERYTHROCYTE [DISTWIDTH] IN BLOOD BY AUTOMATED COUNT: 15.5 % (ref 12.3–15.4)
ERYTHROCYTE [DISTWIDTH] IN BLOOD BY AUTOMATED COUNT: 15.8 % (ref 12.3–15.4)
ERYTHROCYTE [DISTWIDTH] IN BLOOD BY AUTOMATED COUNT: 15.9 % (ref 12.3–15.4)
FERRITIN SERPL-MCNC: 194.2 NG/ML (ref 13–150)
FIBRINOGEN PPP-MCNC: 297 MG/DL (ref 220–470)
FLUAV RNA RESP QL NAA+PROBE: NOT DETECTED
FLUAV SUBTYP SPEC NAA+PROBE: NOT DETECTED
FLUAV SUBTYP SPEC NAA+PROBE: NOT DETECTED
FLUBV RNA ISLT QL NAA+PROBE: NOT DETECTED
FLUBV RNA ISLT QL NAA+PROBE: NOT DETECTED
FLUBV RNA RESP QL NAA+PROBE: NOT DETECTED
FSP PPP LA-ACNC: ABNORMAL
GFR SERPL CREATININE-BSD FRML MDRD: 100 ML/MIN/1.73
GFR SERPL CREATININE-BSD FRML MDRD: 119 ML/MIN/1.73
GFR SERPL CREATININE-BSD FRML MDRD: 73 ML/MIN/1.73
GFR SERPL CREATININE-BSD FRML MDRD: 79 ML/MIN/1.73
GFR SERPL CREATININE-BSD FRML MDRD: 96 ML/MIN/1.73
GLOBULIN UR ELPH-MCNC: 2 GM/DL
GLOBULIN UR ELPH-MCNC: 2.1 GM/DL
GLOBULIN UR ELPH-MCNC: 2.1 GM/DL
GLOBULIN UR ELPH-MCNC: 2.2 GM/DL
GLOBULIN UR ELPH-MCNC: 2.4 GM/DL
GLOBULIN UR ELPH-MCNC: 3.1 GM/DL
GLOBULIN UR ELPH-MCNC: 3.3 GM/DL
GLUCOSE BLDC GLUCOMTR-MCNC: 100 MG/DL (ref 70–130)
GLUCOSE BLDC GLUCOMTR-MCNC: 104 MG/DL (ref 70–130)
GLUCOSE BLDC GLUCOMTR-MCNC: 105 MG/DL (ref 70–130)
GLUCOSE BLDC GLUCOMTR-MCNC: 105 MG/DL (ref 70–130)
GLUCOSE BLDC GLUCOMTR-MCNC: 107 MG/DL (ref 70–130)
GLUCOSE BLDC GLUCOMTR-MCNC: 107 MG/DL (ref 70–130)
GLUCOSE BLDC GLUCOMTR-MCNC: 113 MG/DL (ref 70–130)
GLUCOSE BLDC GLUCOMTR-MCNC: 118 MG/DL (ref 70–130)
GLUCOSE BLDC GLUCOMTR-MCNC: 120 MG/DL (ref 70–130)
GLUCOSE BLDC GLUCOMTR-MCNC: 123 MG/DL (ref 70–130)
GLUCOSE BLDC GLUCOMTR-MCNC: 132 MG/DL (ref 70–130)
GLUCOSE BLDC GLUCOMTR-MCNC: 134 MG/DL (ref 70–130)
GLUCOSE BLDC GLUCOMTR-MCNC: 140 MG/DL (ref 70–130)
GLUCOSE BLDC GLUCOMTR-MCNC: 145 MG/DL (ref 70–130)
GLUCOSE BLDC GLUCOMTR-MCNC: 155 MG/DL (ref 70–130)
GLUCOSE BLDC GLUCOMTR-MCNC: 170 MG/DL (ref 70–130)
GLUCOSE BLDC GLUCOMTR-MCNC: 176 MG/DL (ref 70–130)
GLUCOSE BLDC GLUCOMTR-MCNC: 78 MG/DL (ref 70–130)
GLUCOSE BLDC GLUCOMTR-MCNC: 87 MG/DL (ref 70–130)
GLUCOSE BLDC GLUCOMTR-MCNC: 90 MG/DL (ref 70–130)
GLUCOSE BLDC GLUCOMTR-MCNC: 91 MG/DL (ref 70–130)
GLUCOSE BLDC GLUCOMTR-MCNC: 92 MG/DL (ref 70–130)
GLUCOSE BLDC GLUCOMTR-MCNC: 99 MG/DL (ref 70–130)
GLUCOSE BLDC GLUCOMTR-MCNC: 99 MG/DL (ref 70–130)
GLUCOSE SERPL-MCNC: 103 MG/DL (ref 65–99)
GLUCOSE SERPL-MCNC: 111 MG/DL (ref 65–99)
GLUCOSE SERPL-MCNC: 112 MG/DL (ref 65–99)
GLUCOSE SERPL-MCNC: 117 MG/DL (ref 65–99)
GLUCOSE SERPL-MCNC: 125 MG/DL (ref 65–99)
GLUCOSE SERPL-MCNC: 139 MG/DL (ref 65–99)
GLUCOSE SERPL-MCNC: 145 MG/DL (ref 65–99)
GLUCOSE SERPL-MCNC: 147 MG/DL (ref 65–99)
GLUCOSE SERPL-MCNC: 175 MG/DL (ref 65–99)
GLUCOSE SERPL-MCNC: 190 MG/DL (ref 65–99)
GLUCOSE SERPL-MCNC: 196 MG/DL (ref 65–99)
GLUCOSE SERPL-MCNC: 215 MG/DL (ref 65–99)
GLUCOSE SERPL-MCNC: 88 MG/DL (ref 65–99)
GLUCOSE SERPL-MCNC: 93 MG/DL (ref 65–99)
GLUCOSE SERPL-MCNC: 98 MG/DL (ref 65–99)
GLUCOSE UR STRIP-MCNC: NEGATIVE MG/DL
GLUCOSE UR STRIP-MCNC: NEGATIVE MG/DL
GRAM STN SPEC: NORMAL
HADV DNA SPEC NAA+PROBE: NOT DETECTED
HADV DNA SPEC NAA+PROBE: NOT DETECTED
HAV IGM SERPL QL IA: NORMAL
HBA1C MFR BLD: 5.6 % (ref 4.8–5.6)
HBV CORE IGM SERPL QL IA: NORMAL
HBV SURFACE AG SERPL QL IA: NORMAL
HCO3 BLDA-SCNC: 19.4 MMOL/L (ref 20–26)
HCO3 BLDA-SCNC: 30.8 MMOL/L (ref 20–26)
HCO3 BLDA-SCNC: 31.8 MMOL/L (ref 20–26)
HCO3 BLDA-SCNC: 49.3 MMOL/L (ref 20–26)
HCO3 BLDA-SCNC: 52.3 MMOL/L (ref 20–26)
HCOV 229E RNA SPEC QL NAA+PROBE: NOT DETECTED
HCOV 229E RNA SPEC QL NAA+PROBE: NOT DETECTED
HCOV HKU1 RNA SPEC QL NAA+PROBE: NOT DETECTED
HCOV HKU1 RNA SPEC QL NAA+PROBE: NOT DETECTED
HCOV NL63 RNA SPEC QL NAA+PROBE: NOT DETECTED
HCOV NL63 RNA SPEC QL NAA+PROBE: NOT DETECTED
HCOV OC43 RNA SPEC QL NAA+PROBE: NOT DETECTED
HCOV OC43 RNA SPEC QL NAA+PROBE: NOT DETECTED
HCT VFR BLD AUTO: 28.6 % (ref 34–46.6)
HCT VFR BLD AUTO: 28.9 % (ref 34–46.6)
HCT VFR BLD AUTO: 31.4 % (ref 34–46.6)
HCT VFR BLD AUTO: 31.6 % (ref 34–46.6)
HCT VFR BLD AUTO: 32.3 % (ref 34–46.6)
HCT VFR BLD AUTO: 35.9 % (ref 34–46.6)
HCT VFR BLD AUTO: 36.3 % (ref 34–46.6)
HCT VFR BLD AUTO: 36.9 % (ref 34–46.6)
HCT VFR BLD AUTO: 39.3 % (ref 34–46.6)
HCT VFR BLD AUTO: 39.6 % (ref 34–46.6)
HCT VFR BLD AUTO: 39.6 % (ref 34–46.6)
HCT VFR BLD AUTO: 40.4 % (ref 34–46.6)
HCT VFR BLD AUTO: 42.4 % (ref 34–46.6)
HCT VFR BLD AUTO: 42.7 % (ref 34–46.6)
HCT VFR BLD AUTO: 43 % (ref 34–46.6)
HCT VFR BLD AUTO: 44 % (ref 34–46.6)
HCT VFR BLD AUTO: 51.8 % (ref 34–46.6)
HCT VFR BLD CALC: 28.5 % (ref 38–51)
HCT VFR BLD CALC: 34.4 % (ref 38–51)
HCT VFR BLD CALC: 36.4 % (ref 38–51)
HCT VFR BLD CALC: 38.2 % (ref 38–51)
HCT VFR BLD CALC: 48.3 % (ref 38–51)
HCV AB SER DONR QL: NORMAL
HGB BLD-MCNC: 10.2 G/DL (ref 12–15.9)
HGB BLD-MCNC: 10.3 G/DL (ref 12–15.9)
HGB BLD-MCNC: 10.6 G/DL (ref 12–15.9)
HGB BLD-MCNC: 11.6 G/DL (ref 12–15.9)
HGB BLD-MCNC: 12.1 G/DL (ref 12–15.9)
HGB BLD-MCNC: 12.6 G/DL (ref 12–15.9)
HGB BLD-MCNC: 12.9 G/DL (ref 12–15.9)
HGB BLD-MCNC: 13.3 G/DL (ref 12–15.9)
HGB BLD-MCNC: 13.3 G/DL (ref 12–15.9)
HGB BLD-MCNC: 13.4 G/DL (ref 12–15.9)
HGB BLD-MCNC: 13.6 G/DL (ref 12–15.9)
HGB BLD-MCNC: 13.7 G/DL (ref 12–15.9)
HGB BLD-MCNC: 14.2 G/DL (ref 12–15.9)
HGB BLD-MCNC: 14.5 G/DL (ref 12–15.9)
HGB BLD-MCNC: 17.3 G/DL (ref 12–15.9)
HGB BLD-MCNC: 9 G/DL (ref 12–15.9)
HGB BLD-MCNC: 9.4 G/DL (ref 12–15.9)
HGB BLDA-MCNC: 11.2 G/DL (ref 14–18)
HGB BLDA-MCNC: 11.9 G/DL (ref 14–18)
HGB BLDA-MCNC: 12.5 G/DL (ref 14–18)
HGB BLDA-MCNC: 15.8 G/DL (ref 14–18)
HGB BLDA-MCNC: 9.3 G/DL (ref 14–18)
HGB UR QL STRIP.AUTO: NEGATIVE
HGB UR QL STRIP.AUTO: NEGATIVE
HIV1+2 AB SER QL: NORMAL
HMPV RNA NPH QL NAA+NON-PROBE: NOT DETECTED
HMPV RNA NPH QL NAA+NON-PROBE: NOT DETECTED
HOLD SPECIMEN: NORMAL
HPIV1 RNA ISLT QL NAA+PROBE: NOT DETECTED
HPIV1 RNA ISLT QL NAA+PROBE: NOT DETECTED
HPIV2 RNA SPEC QL NAA+PROBE: NOT DETECTED
HPIV2 RNA SPEC QL NAA+PROBE: NOT DETECTED
HPIV3 RNA NPH QL NAA+PROBE: NOT DETECTED
HPIV3 RNA NPH QL NAA+PROBE: NOT DETECTED
HPIV4 P GENE NPH QL NAA+PROBE: NOT DETECTED
HPIV4 P GENE NPH QL NAA+PROBE: NOT DETECTED
HYALINE CASTS UR QL AUTO: ABNORMAL /LPF
IGA1 MFR SER: 210 MG/DL (ref 70–400)
IGG1 SER-MCNC: 446 MG/DL (ref 700–1600)
IGM SERPL-MCNC: 86 MG/DL (ref 40–230)
IMM GRANULOCYTES # BLD AUTO: 0.01 10*3/MM3 (ref 0–0.05)
IMM GRANULOCYTES # BLD AUTO: 0.02 10*3/MM3 (ref 0–0.05)
IMM GRANULOCYTES # BLD AUTO: 0.03 10*3/MM3 (ref 0–0.05)
IMM GRANULOCYTES # BLD AUTO: 0.05 10*3/MM3 (ref 0–0.05)
IMM GRANULOCYTES # BLD AUTO: 0.23 10*3/MM3 (ref 0–0.05)
IMM GRANULOCYTES # BLD AUTO: 0.51 10*3/MM3 (ref 0–0.05)
IMM GRANULOCYTES # BLD AUTO: 0.73 10*3/MM3 (ref 0–0.05)
IMM GRANULOCYTES NFR BLD AUTO: 0.1 % (ref 0–0.5)
IMM GRANULOCYTES NFR BLD AUTO: 0.3 % (ref 0–0.5)
IMM GRANULOCYTES NFR BLD AUTO: 0.4 % (ref 0–0.5)
IMM GRANULOCYTES NFR BLD AUTO: 0.5 % (ref 0–0.5)
IMM GRANULOCYTES NFR BLD AUTO: 0.5 % (ref 0–0.5)
IMM GRANULOCYTES NFR BLD AUTO: 1.4 % (ref 0–0.5)
IMM GRANULOCYTES NFR BLD AUTO: 6.3 % (ref 0–0.5)
IMM GRANULOCYTES NFR BLD AUTO: 7.5 % (ref 0–0.5)
IMM GRANULOCYTES NFR BLD AUTO: 7.6 % (ref 0–0.5)
INHALED O2 CONCENTRATION: 28 %
INHALED O2 CONCENTRATION: 35 %
INHALED O2 CONCENTRATION: 36 %
INHALED O2 CONCENTRATION: 36 %
INHALED O2 CONCENTRATION: 50 %
INR PPP: 1.15 (ref 0.84–1.13)
INR PPP: 1.29 (ref 0.84–1.13)
INR PPP: 1.32 (ref 0.84–1.13)
IPAP: 0
KETONES UR QL STRIP: NEGATIVE
KETONES UR QL STRIP: NEGATIVE
L PNEUMO1 AG UR QL IA: NEGATIVE
L PNEUMO1 AG UR QL IA: NEGATIVE
LARGE PLATELETS: NORMAL
LDH SERPL-CCNC: 186 U/L (ref 135–214)
LDH SERPL-CCNC: 317 U/L (ref 135–214)
LEFT ATRIUM VOLUME INDEX: 13 ML/M2
LEUKOCYTE ESTERASE UR QL STRIP.AUTO: NEGATIVE
LEUKOCYTE ESTERASE UR QL STRIP.AUTO: NEGATIVE
LYMPHOCYTES # BLD AUTO: 0.59 10*3/MM3 (ref 0.7–3.1)
LYMPHOCYTES # BLD AUTO: 0.61 10*3/MM3 (ref 0.7–3.1)
LYMPHOCYTES # BLD AUTO: 1 10*3/MM3 (ref 0.7–3.1)
LYMPHOCYTES # BLD AUTO: 1.38 10*3/MM3 (ref 0.7–3.1)
LYMPHOCYTES # BLD AUTO: 1.48 10*3/MM3 (ref 0.7–3.1)
LYMPHOCYTES # BLD AUTO: 1.63 10*3/MM3 (ref 0.7–3.1)
LYMPHOCYTES # BLD AUTO: 1.82 10*3/MM3 (ref 0.7–3.1)
LYMPHOCYTES # BLD AUTO: 2.27 10*3/MM3 (ref 0.7–3.1)
LYMPHOCYTES # BLD AUTO: 2.41 10*3/MM3 (ref 0.7–3.1)
LYMPHOCYTES # BLD MANUAL: 1.62 10*3/MM3 (ref 0.7–3.1)
LYMPHOCYTES # BLD MANUAL: 2.06 10*3/MM3 (ref 0.7–3.1)
LYMPHOCYTES # BLD MANUAL: 2.09 10*3/MM3 (ref 0.7–3.1)
LYMPHOCYTES # BLD MANUAL: 4.19 10*3/MM3 (ref 0.7–3.1)
LYMPHOCYTES # BLD MANUAL: 4.92 10*3/MM3 (ref 0.7–3.1)
LYMPHOCYTES NFR BLD AUTO: 11 % (ref 19.6–45.3)
LYMPHOCYTES NFR BLD AUTO: 15.6 % (ref 19.6–45.3)
LYMPHOCYTES NFR BLD AUTO: 17.1 % (ref 19.6–45.3)
LYMPHOCYTES NFR BLD AUTO: 18.2 % (ref 19.6–45.3)
LYMPHOCYTES NFR BLD AUTO: 24.2 % (ref 19.6–45.3)
LYMPHOCYTES NFR BLD AUTO: 24.8 % (ref 19.6–45.3)
LYMPHOCYTES NFR BLD AUTO: 26.4 % (ref 19.6–45.3)
LYMPHOCYTES NFR BLD AUTO: 30 % (ref 19.6–45.3)
LYMPHOCYTES NFR BLD AUTO: 40.5 % (ref 19.6–45.3)
LYMPHOCYTES NFR BLD MANUAL: 3 % (ref 5–12)
LYMPHOCYTES NFR BLD MANUAL: 4 % (ref 5–12)
Lab: ABNORMAL
Lab: ABNORMAL
M PNEUMO IGG SER IA-ACNC: NOT DETECTED
M PNEUMO IGG SER IA-ACNC: NOT DETECTED
MAGNESIUM SERPL-MCNC: 1.6 MG/DL (ref 1.6–2.4)
MAGNESIUM SERPL-MCNC: 1.8 MG/DL (ref 1.6–2.4)
MAGNESIUM SERPL-MCNC: 2 MG/DL (ref 1.6–2.4)
MAGNESIUM SERPL-MCNC: 2 MG/DL (ref 1.6–2.4)
MAGNESIUM SERPL-MCNC: 2.2 MG/DL (ref 1.6–2.4)
MAGNESIUM SERPL-MCNC: 2.5 MG/DL (ref 1.6–2.4)
MAXIMAL PREDICTED HEART RATE: 140 BPM
MCH RBC QN AUTO: 30 PG (ref 26.6–33)
MCH RBC QN AUTO: 30.2 PG (ref 26.6–33)
MCH RBC QN AUTO: 30.5 PG (ref 26.6–33)
MCH RBC QN AUTO: 30.6 PG (ref 26.6–33)
MCH RBC QN AUTO: 30.6 PG (ref 26.6–33)
MCH RBC QN AUTO: 30.8 PG (ref 26.6–33)
MCH RBC QN AUTO: 30.8 PG (ref 26.6–33)
MCH RBC QN AUTO: 30.9 PG (ref 26.6–33)
MCH RBC QN AUTO: 30.9 PG (ref 26.6–33)
MCH RBC QN AUTO: 31.1 PG (ref 26.6–33)
MCH RBC QN AUTO: 31.2 PG (ref 26.6–33)
MCH RBC QN AUTO: 31.3 PG (ref 26.6–33)
MCH RBC QN AUTO: 31.3 PG (ref 26.6–33)
MCHC RBC AUTO-ENTMCNC: 31.5 G/DL (ref 31.5–35.7)
MCHC RBC AUTO-ENTMCNC: 31.9 G/DL (ref 31.5–35.7)
MCHC RBC AUTO-ENTMCNC: 31.9 G/DL (ref 31.5–35.7)
MCHC RBC AUTO-ENTMCNC: 32 G/DL (ref 31.5–35.7)
MCHC RBC AUTO-ENTMCNC: 32.3 G/DL (ref 31.5–35.7)
MCHC RBC AUTO-ENTMCNC: 32.3 G/DL (ref 31.5–35.7)
MCHC RBC AUTO-ENTMCNC: 32.5 G/DL (ref 31.5–35.7)
MCHC RBC AUTO-ENTMCNC: 32.8 G/DL (ref 31.5–35.7)
MCHC RBC AUTO-ENTMCNC: 32.9 G/DL (ref 31.5–35.7)
MCHC RBC AUTO-ENTMCNC: 33 G/DL (ref 31.5–35.7)
MCHC RBC AUTO-ENTMCNC: 33 G/DL (ref 31.5–35.7)
MCHC RBC AUTO-ENTMCNC: 33.4 G/DL (ref 31.5–35.7)
MCHC RBC AUTO-ENTMCNC: 33.6 G/DL (ref 31.5–35.7)
MCHC RBC AUTO-ENTMCNC: 33.7 G/DL (ref 31.5–35.7)
MCHC RBC AUTO-ENTMCNC: 33.8 G/DL (ref 31.5–35.7)
MCHC RBC AUTO-ENTMCNC: 33.8 G/DL (ref 31.5–35.7)
MCHC RBC AUTO-ENTMCNC: 34.1 G/DL (ref 31.5–35.7)
MCV RBC AUTO: 89.2 FL (ref 79–97)
MCV RBC AUTO: 89.5 FL (ref 79–97)
MCV RBC AUTO: 90.4 FL (ref 79–97)
MCV RBC AUTO: 91.5 FL (ref 79–97)
MCV RBC AUTO: 91.9 FL (ref 79–97)
MCV RBC AUTO: 92.4 FL (ref 79–97)
MCV RBC AUTO: 92.5 FL (ref 79–97)
MCV RBC AUTO: 93.3 FL (ref 79–97)
MCV RBC AUTO: 93.4 FL (ref 79–97)
MCV RBC AUTO: 93.8 FL (ref 79–97)
MCV RBC AUTO: 94.9 FL (ref 79–97)
MCV RBC AUTO: 95.1 FL (ref 79–97)
MCV RBC AUTO: 95.5 FL (ref 79–97)
MCV RBC AUTO: 95.5 FL (ref 79–97)
MCV RBC AUTO: 95.6 FL (ref 79–97)
MCV RBC AUTO: 96.3 FL (ref 79–97)
MCV RBC AUTO: 97.9 FL (ref 79–97)
METAMYELOCYTES NFR BLD MANUAL: 1 % (ref 0–0)
METAMYELOCYTES NFR BLD MANUAL: 1 % (ref 0–0)
METAMYELOCYTES NFR BLD MANUAL: 2 % (ref 0–0)
METAMYELOCYTES NFR BLD MANUAL: 4 % (ref 0–0)
METHGB BLD QL: 0.3 % (ref 0–1.5)
METHGB BLD QL: 0.4 % (ref 0–1.5)
METHGB BLD QL: 0.5 % (ref 0–1.5)
METHGB BLD QL: 0.8 % (ref 0–1.5)
METHGB BLD QL: 0.8 % (ref 0–1.5)
MODALITY: ABNORMAL
MONOCYTES # BLD AUTO: 0.09 10*3/MM3 (ref 0.1–0.9)
MONOCYTES # BLD AUTO: 0.1 10*3/MM3 (ref 0.1–0.9)
MONOCYTES # BLD AUTO: 0.2 10*3/MM3 (ref 0.1–0.9)
MONOCYTES # BLD AUTO: 0.39 10*3/MM3 (ref 0.1–0.9)
MONOCYTES # BLD AUTO: 0.39 10*3/MM3 (ref 0.1–0.9)
MONOCYTES # BLD AUTO: 0.48 10*3/MM3 (ref 0.1–0.9)
MONOCYTES # BLD AUTO: 0.49 10*3/MM3 (ref 0.1–0.9)
MONOCYTES # BLD AUTO: 0.62 10*3/MM3 (ref 0.1–0.9)
MONOCYTES # BLD AUTO: 0.77 10*3/MM3 (ref 0.1–0.9)
MONOCYTES # BLD: 0.16 10*3/MM3 (ref 0.1–0.9)
MONOCYTES # BLD: 0.17 10*3/MM3 (ref 0.1–0.9)
MONOCYTES # BLD: 0.33 10*3/MM3 (ref 0.1–0.9)
MONOCYTES # BLD: 0.41 10*3/MM3 (ref 0.1–0.9)
MONOCYTES # BLD: 0.49 10*3/MM3 (ref 0.1–0.9)
MONOCYTES NFR BLD AUTO: 1.8 % (ref 5–12)
MONOCYTES NFR BLD AUTO: 10.1 % (ref 5–12)
MONOCYTES NFR BLD AUTO: 2.6 % (ref 5–12)
MONOCYTES NFR BLD AUTO: 5.1 % (ref 5–12)
MONOCYTES NFR BLD AUTO: 5.5 % (ref 5–12)
MONOCYTES NFR BLD AUTO: 5.8 % (ref 5–12)
MONOCYTES NFR BLD AUTO: 6.1 % (ref 5–12)
MONOCYTES NFR BLD AUTO: 7 % (ref 5–12)
MONOCYTES NFR BLD AUTO: 8.2 % (ref 5–12)
MRSA DNA SPEC QL NAA+PROBE: NEGATIVE
MRSA DNA SPEC QL NAA+PROBE: NEGATIVE
NEUTROPHILS # BLD AUTO: 2.15 10*3/MM3 (ref 1.7–7)
NEUTROPHILS # BLD AUTO: 2.75 10*3/MM3 (ref 1.7–7)
NEUTROPHILS # BLD AUTO: 3.33 10*3/MM3 (ref 1.7–7)
NEUTROPHILS # BLD AUTO: 7.51 10*3/MM3 (ref 1.7–7)
NEUTROPHILS # BLD AUTO: 7.71 10*3/MM3 (ref 1.7–7)
NEUTROPHILS NFR BLD AUTO: 1.72 10*3/MM3 (ref 1.7–7)
NEUTROPHILS NFR BLD AUTO: 2.71 10*3/MM3 (ref 1.7–7)
NEUTROPHILS NFR BLD AUTO: 2.9 10*3/MM3 (ref 1.7–7)
NEUTROPHILS NFR BLD AUTO: 3.99 10*3/MM3 (ref 1.7–7)
NEUTROPHILS NFR BLD AUTO: 4.17 10*3/MM3 (ref 1.7–7)
NEUTROPHILS NFR BLD AUTO: 4.31 10*3/MM3 (ref 1.7–7)
NEUTROPHILS NFR BLD AUTO: 4.5 10*3/MM3 (ref 1.7–7)
NEUTROPHILS NFR BLD AUTO: 4.94 10*3/MM3 (ref 1.7–7)
NEUTROPHILS NFR BLD AUTO: 42 % (ref 42.7–76)
NEUTROPHILS NFR BLD AUTO: 47.1 % (ref 42.7–76)
NEUTROPHILS NFR BLD AUTO: 5.99 10*3/MM3 (ref 1.7–7)
NEUTROPHILS NFR BLD AUTO: 52.7 % (ref 42.7–76)
NEUTROPHILS NFR BLD AUTO: 56.7 % (ref 42.7–76)
NEUTROPHILS NFR BLD AUTO: 61.8 % (ref 42.7–76)
NEUTROPHILS NFR BLD AUTO: 61.9 % (ref 42.7–76)
NEUTROPHILS NFR BLD AUTO: 77.3 % (ref 42.7–76)
NEUTROPHILS NFR BLD AUTO: 78.6 % (ref 42.7–76)
NEUTROPHILS NFR BLD AUTO: 81.1 % (ref 42.7–76)
NEUTROPHILS NFR BLD MANUAL: 27 % (ref 42.7–76)
NEUTROPHILS NFR BLD MANUAL: 31 % (ref 42.7–76)
NEUTROPHILS NFR BLD MANUAL: 33 % (ref 42.7–76)
NEUTROPHILS NFR BLD MANUAL: 35 % (ref 42.7–76)
NEUTROPHILS NFR BLD MANUAL: 64 % (ref 42.7–76)
NEUTS BAND NFR BLD MANUAL: 11 % (ref 0–5)
NEUTS BAND NFR BLD MANUAL: 20 % (ref 0–5)
NEUTS BAND NFR BLD MANUAL: 26 % (ref 0–5)
NEUTS BAND NFR BLD MANUAL: 28 % (ref 0–5)
NEUTS BAND NFR BLD MANUAL: 6 % (ref 0–5)
NITRITE UR QL STRIP: NEGATIVE
NITRITE UR QL STRIP: NEGATIVE
NOTE: ABNORMAL
NOTIFIED BY: ABNORMAL
NOTIFIED BY: ABNORMAL
NOTIFIED WHO: ABNORMAL
NOTIFIED WHO: ABNORMAL
NRBC BLD AUTO-RTO: 0 /100 WBC (ref 0–0.2)
NRBC BLD AUTO-RTO: 1.3 /100 WBC (ref 0–0.2)
NRBC BLD AUTO-RTO: 1.8 /100 WBC (ref 0–0.2)
NRBC BLD AUTO-RTO: 4.1 /100 WBC (ref 0–0.2)
NRBC SPEC MANUAL: 3 /100 WBC (ref 0–0.2)
NRBC SPEC MANUAL: 4 /100 WBC (ref 0–0.2)
NRBC SPEC MANUAL: 5 /100 WBC (ref 0–0.2)
NRBC SPEC MANUAL: 7 /100 WBC (ref 0–0.2)
NT-PROBNP SERPL-MCNC: 1680 PG/ML (ref 0–1800)
NT-PROBNP SERPL-MCNC: 257.2 PG/ML (ref 0–1800)
NT-PROBNP SERPL-MCNC: 670.3 PG/ML (ref 0–1800)
OXYHGB MFR BLDV: 89.8 % (ref 94–99)
OXYHGB MFR BLDV: 89.8 % (ref 94–99)
OXYHGB MFR BLDV: 90.3 % (ref 94–99)
OXYHGB MFR BLDV: 92.9 % (ref 94–99)
OXYHGB MFR BLDV: 93.5 % (ref 94–99)
PATH INTERP BLD-IMP: NORMAL
PATH INTERP BLD-IMP: NORMAL
PAW @ PEAK INSP FLOW SETTING VENT: 0 CMH2O
PCO2 BLDA: 28.9 MM HG (ref 35–45)
PCO2 BLDA: 49.9 MM HG (ref 35–45)
PCO2 BLDA: 53.6 MM HG (ref 35–45)
PCO2 BLDA: 54.2 MM HG (ref 35–45)
PCO2 BLDA: 69.9 MM HG (ref 35–45)
PCO2 TEMP ADJ BLD: 28.9 MM HG (ref 35–45)
PCO2 TEMP ADJ BLD: 49.9 MM HG (ref 35–45)
PCO2 TEMP ADJ BLD: 53.6 MM HG (ref 35–45)
PCO2 TEMP ADJ BLD: 54.2 MM HG (ref 35–45)
PCO2 TEMP ADJ BLD: 69.9 MM HG (ref 35–45)
PH BLDA: 7.37 PH UNITS (ref 7.35–7.45)
PH BLDA: 7.38 PH UNITS (ref 7.35–7.45)
PH BLDA: 7.44 PH UNITS (ref 7.35–7.45)
PH BLDA: 7.46 PH UNITS (ref 7.35–7.45)
PH BLDA: 7.63 PH UNITS (ref 7.35–7.45)
PH UR STRIP.AUTO: 7 [PH] (ref 5–8)
PH UR STRIP.AUTO: 7 [PH] (ref 5–8)
PH, TEMP CORRECTED: 7.37 PH UNITS
PH, TEMP CORRECTED: 7.38 PH UNITS
PH, TEMP CORRECTED: 7.44 PH UNITS
PH, TEMP CORRECTED: 7.46 PH UNITS
PH, TEMP CORRECTED: 7.63 PH UNITS
PLAT MORPH BLD: NORMAL
PLATELET # BLD AUTO: 141 10*3/MM3 (ref 140–450)
PLATELET # BLD AUTO: 182 10*3/MM3 (ref 140–450)
PLATELET # BLD AUTO: 184 10*3/MM3 (ref 140–450)
PLATELET # BLD AUTO: 187 10*3/MM3 (ref 140–450)
PLATELET # BLD AUTO: 196 10*3/MM3 (ref 140–450)
PLATELET # BLD AUTO: 207 10*3/MM3 (ref 140–450)
PLATELET # BLD AUTO: 216 10*3/MM3 (ref 140–450)
PLATELET # BLD AUTO: 290 10*3/MM3 (ref 140–450)
PLATELET # BLD AUTO: 299 10*3/MM3 (ref 140–450)
PLATELET # BLD AUTO: 361 10*3/MM3 (ref 140–450)
PLATELET # BLD AUTO: 362 10*3/MM3 (ref 140–450)
PLATELET # BLD AUTO: 44 10*3/MM3 (ref 140–450)
PLATELET # BLD AUTO: 58 10*3/MM3 (ref 140–450)
PLATELET # BLD AUTO: 61 10*3/MM3 (ref 140–450)
PLATELET # BLD AUTO: 72 10*3/MM3 (ref 140–450)
PLATELET # BLD AUTO: 87 10*3/MM3 (ref 140–450)
PLATELET # BLD AUTO: 89 10*3/MM3 (ref 140–450)
PLATELETS (CITRATED) BY AUTOMATED COUNT: 34 10*3/MM3 (ref 140–450)
PMV BLD AUTO: 10.1 FL (ref 6–12)
PMV BLD AUTO: 10.2 FL (ref 6–12)
PMV BLD AUTO: 10.3 FL (ref 6–12)
PMV BLD AUTO: 10.5 FL (ref 6–12)
PMV BLD AUTO: 10.6 FL (ref 6–12)
PMV BLD AUTO: 11 FL (ref 6–12)
PMV BLD AUTO: 11.3 FL (ref 6–12)
PMV BLD AUTO: 11.3 FL (ref 6–12)
PMV BLD AUTO: 11.5 FL (ref 6–12)
PMV BLD AUTO: 12 FL (ref 6–12)
PMV BLD AUTO: 9 FL (ref 6–12)
PMV BLD AUTO: 9.4 FL (ref 6–12)
PMV BLD AUTO: 9.5 FL (ref 6–12)
PMV BLD AUTO: 9.7 FL (ref 6–12)
PMV BLD AUTO: 9.8 FL (ref 6–12)
PO2 BLDA: 55.9 MM HG (ref 83–108)
PO2 BLDA: 64.3 MM HG (ref 83–108)
PO2 BLDA: 64.5 MM HG (ref 83–108)
PO2 BLDA: 79 MM HG (ref 83–108)
PO2 BLDA: 81.9 MM HG (ref 83–108)
PO2 TEMP ADJ BLD: 55.9 MM HG (ref 83–108)
PO2 TEMP ADJ BLD: 64.3 MM HG (ref 83–108)
PO2 TEMP ADJ BLD: 64.5 MM HG (ref 83–108)
PO2 TEMP ADJ BLD: 79 MM HG (ref 83–108)
PO2 TEMP ADJ BLD: 81.9 MM HG (ref 83–108)
POLYCHROMASIA BLD QL SMEAR: ABNORMAL
POTASSIUM SERPL-SCNC: 2.4 MMOL/L (ref 3.5–5.2)
POTASSIUM SERPL-SCNC: 2.4 MMOL/L (ref 3.5–5.2)
POTASSIUM SERPL-SCNC: 2.5 MMOL/L (ref 3.5–5.2)
POTASSIUM SERPL-SCNC: 2.5 MMOL/L (ref 3.5–5.2)
POTASSIUM SERPL-SCNC: 2.8 MMOL/L (ref 3.5–5.2)
POTASSIUM SERPL-SCNC: 3 MMOL/L (ref 3.5–5.2)
POTASSIUM SERPL-SCNC: 3.1 MMOL/L (ref 3.5–5.2)
POTASSIUM SERPL-SCNC: 3.2 MMOL/L (ref 3.5–5.2)
POTASSIUM SERPL-SCNC: 3.3 MMOL/L (ref 3.5–5.2)
POTASSIUM SERPL-SCNC: 3.4 MMOL/L (ref 3.5–5.2)
POTASSIUM SERPL-SCNC: 3.5 MMOL/L (ref 3.5–5.2)
POTASSIUM SERPL-SCNC: 3.6 MMOL/L (ref 3.5–5.2)
POTASSIUM SERPL-SCNC: 3.9 MMOL/L (ref 3.5–5.2)
POTASSIUM SERPL-SCNC: 4.2 MMOL/L (ref 3.5–5.2)
POTASSIUM SERPL-SCNC: 4.2 MMOL/L (ref 3.5–5.2)
POTASSIUM SERPL-SCNC: 4.3 MMOL/L (ref 3.5–5.2)
POTASSIUM SERPL-SCNC: 4.3 MMOL/L (ref 3.5–5.2)
POTASSIUM SERPL-SCNC: 4.4 MMOL/L (ref 3.5–5.2)
POTASSIUM SERPL-SCNC: 4.5 MMOL/L (ref 3.5–5.2)
PROCALCITONIN SERPL-MCNC: 0.07 NG/ML (ref 0–0.25)
PROCALCITONIN SERPL-MCNC: 3.15 NG/ML (ref 0–0.25)
PROCALCITONIN SERPL-MCNC: 5.29 NG/ML (ref 0–0.25)
PROT SERPL-MCNC: 5 G/DL (ref 6–8.5)
PROT SERPL-MCNC: 5.1 G/DL (ref 6–8.5)
PROT SERPL-MCNC: 5.4 G/DL (ref 6–8.5)
PROT SERPL-MCNC: 5.6 G/DL (ref 6–8.5)
PROT SERPL-MCNC: 5.8 G/DL (ref 6–8.5)
PROT SERPL-MCNC: 6.3 G/DL (ref 6–8.5)
PROT SERPL-MCNC: 6.3 G/DL (ref 6–8.5)
PROT SERPL-MCNC: 7.7 G/DL (ref 6–8.5)
PROT UR QL STRIP: ABNORMAL
PROT UR QL STRIP: NEGATIVE
PROTHROMBIN TIME: 14.7 SECONDS (ref 11.4–14.4)
PROTHROMBIN TIME: 16 SECONDS (ref 11.4–14.4)
PROTHROMBIN TIME: 16.4 SECONDS (ref 11.4–14.4)
QT INTERVAL: 288 MS
QT INTERVAL: 340 MS
QT INTERVAL: 342 MS
QT INTERVAL: 344 MS
QT INTERVAL: 352 MS
QTC INTERVAL: 380 MS
QTC INTERVAL: 453 MS
QTC INTERVAL: 454 MS
QTC INTERVAL: 458 MS
QTC INTERVAL: 469 MS
RBC # BLD AUTO: 2.92 10*6/MM3 (ref 3.77–5.28)
RBC # BLD AUTO: 3 10*6/MM3 (ref 3.77–5.28)
RBC # BLD AUTO: 3.31 10*6/MM3 (ref 3.77–5.28)
RBC # BLD AUTO: 3.38 10*6/MM3 (ref 3.77–5.28)
RBC # BLD AUTO: 3.4 10*6/MM3 (ref 3.77–5.28)
RBC # BLD AUTO: 3.8 10*6/MM3 (ref 3.77–5.28)
RBC # BLD AUTO: 4.01 10*6/MM3 (ref 3.77–5.28)
RBC # BLD AUTO: 4.08 10*6/MM3 (ref 3.77–5.28)
RBC # BLD AUTO: 4.21 10*6/MM3 (ref 3.77–5.28)
RBC # BLD AUTO: 4.25 10*6/MM3 (ref 3.77–5.28)
RBC # BLD AUTO: 4.31 10*6/MM3 (ref 3.77–5.28)
RBC # BLD AUTO: 4.44 10*6/MM3 (ref 3.77–5.28)
RBC # BLD AUTO: 4.5 10*6/MM3 (ref 3.77–5.28)
RBC # BLD AUTO: 4.54 10*6/MM3 (ref 3.77–5.28)
RBC # BLD AUTO: 4.65 10*6/MM3 (ref 3.77–5.28)
RBC # BLD AUTO: 4.69 10*6/MM3 (ref 3.77–5.28)
RBC # BLD AUTO: 5.66 10*6/MM3 (ref 3.77–5.28)
RBC # UR STRIP: ABNORMAL /HPF
RBC MORPH BLD: NORMAL
REF LAB TEST METHOD: ABNORMAL
RH BLD: POSITIVE
RHINOVIRUS RNA SPEC NAA+PROBE: NOT DETECTED
RHINOVIRUS RNA SPEC NAA+PROBE: NOT DETECTED
RSV RNA NPH QL NAA+NON-PROBE: NOT DETECTED
RSV RNA NPH QL NAA+NON-PROBE: NOT DETECTED
S PNEUM AG SPEC QL LA: NEGATIVE
S PNEUM AG SPEC QL LA: NEGATIVE
SARS-COV-2 RDRP RESP QL NAA+PROBE: NORMAL
SARS-COV-2 RDRP RESP QL NAA+PROBE: NORMAL
SARS-COV-2 RNA NPH QL NAA+NON-PROBE: NOT DETECTED
SARS-COV-2 RNA NPH QL NAA+NON-PROBE: NOT DETECTED
SARS-COV-2 RNA PNL SPEC NAA+PROBE: NOT DETECTED
SARS-COV-2 RNA RESP QL NAA+PROBE: NOT DETECTED
SMALL PLATELETS BLD QL SMEAR: ABNORMAL
SMALL PLATELETS BLD QL SMEAR: ABNORMAL
SMALL PLATELETS BLD QL SMEAR: NORMAL
SODIUM SERPL-SCNC: 140 MMOL/L (ref 136–145)
SODIUM SERPL-SCNC: 141 MMOL/L (ref 136–145)
SODIUM SERPL-SCNC: 141 MMOL/L (ref 136–145)
SODIUM SERPL-SCNC: 142 MMOL/L (ref 136–145)
SODIUM SERPL-SCNC: 142 MMOL/L (ref 136–145)
SODIUM SERPL-SCNC: 143 MMOL/L (ref 136–145)
SODIUM SERPL-SCNC: 143 MMOL/L (ref 136–145)
SODIUM SERPL-SCNC: 144 MMOL/L (ref 136–145)
SODIUM SERPL-SCNC: 144 MMOL/L (ref 136–145)
SODIUM SERPL-SCNC: 145 MMOL/L (ref 136–145)
SODIUM SERPL-SCNC: 146 MMOL/L (ref 136–145)
SODIUM SERPL-SCNC: 148 MMOL/L (ref 136–145)
SODIUM SERPL-SCNC: 148 MMOL/L (ref 136–145)
SODIUM SERPL-SCNC: 149 MMOL/L (ref 136–145)
SODIUM SERPL-SCNC: 150 MMOL/L (ref 136–145)
SP GR UR STRIP: 1.01 (ref 1–1.03)
SP GR UR STRIP: 1.01 (ref 1–1.03)
SQUAMOUS #/AREA URNS HPF: ABNORMAL /HPF
STRESS TARGET HR: 119 BPM
T&S EXPIRATION DATE: NORMAL
TOTAL RATE: 0 BREATHS/MINUTE
TROPONIN T SERPL-MCNC: <0.01 NG/ML (ref 0–0.03)
TROPONIN T SERPL-MCNC: <0.01 NG/ML (ref 0–0.03)
UNIT  ABO: NORMAL
UNIT  ABO: NORMAL
UNIT  RH: NORMAL
UNIT  RH: NORMAL
UROBILINOGEN UR QL STRIP: ABNORMAL
UROBILINOGEN UR QL STRIP: NORMAL
VARIANT LYMPHS NFR BLD MANUAL: 1 % (ref 0–5)
VARIANT LYMPHS NFR BLD MANUAL: 20 % (ref 19.6–45.3)
VARIANT LYMPHS NFR BLD MANUAL: 34 % (ref 19.6–45.3)
VARIANT LYMPHS NFR BLD MANUAL: 37 % (ref 19.6–45.3)
VARIANT LYMPHS NFR BLD MANUAL: 40 % (ref 19.6–45.3)
VARIANT LYMPHS NFR BLD MANUAL: 58 % (ref 19.6–45.3)
VENTILATOR MODE: ABNORMAL
VENTILATOR MODE: ABNORMAL
WBC # UR STRIP: ABNORMAL /HPF
WBC MORPH BLD: NORMAL
WBC NRBC COR # BLD: 10.28 10*3/MM3 (ref 3.4–10.8)
WBC NRBC COR # BLD: 12.31 10*3/MM3 (ref 3.4–10.8)
WBC NRBC COR # BLD: 3.45 10*3/MM3 (ref 3.4–10.8)
WBC NRBC COR # BLD: 3.65 10*3/MM3 (ref 3.4–10.8)
WBC NRBC COR # BLD: 4.06 10*3/MM3 (ref 3.4–10.8)
WBC NRBC COR # BLD: 4.81 10*3/MM3 (ref 3.4–10.8)
WBC NRBC COR # BLD: 5.55 10*3/MM3 (ref 3.4–10.8)
WBC NRBC COR # BLD: 5.65 10*3/MM3 (ref 3.4–10.8)
WBC NRBC COR # BLD: 6.39 10*3/MM3 (ref 3.4–10.8)
WBC NRBC COR # BLD: 6.74 10*3/MM3 (ref 3.4–10.8)
WBC NRBC COR # BLD: 6.9 10*3/MM3 (ref 3.4–10.8)
WBC NRBC COR # BLD: 7.56 10*3/MM3 (ref 3.4–10.8)
WBC NRBC COR # BLD: 7.6 10*3/MM3 (ref 3.4–10.8)
WBC NRBC COR # BLD: 8.34 10*3/MM3 (ref 3.4–10.8)
WBC NRBC COR # BLD: 8.65 10*3/MM3 (ref 3.4–10.8)
WBC NRBC COR # BLD: 9.08 10*3/MM3 (ref 3.4–10.8)
WBC NRBC COR # BLD: 9.7 10*3/MM3 (ref 3.4–10.8)
WHOLE BLOOD HOLD COAG: NORMAL
WHOLE BLOOD HOLD SPECIMEN: NORMAL

## 2022-01-01 PROCEDURE — 94664 DEMO&/EVAL PT USE INHALER: CPT

## 2022-01-01 PROCEDURE — 93306 TTE W/DOPPLER COMPLETE: CPT

## 2022-01-01 PROCEDURE — 83880 ASSAY OF NATRIURETIC PEPTIDE: CPT | Performed by: INTERNAL MEDICINE

## 2022-01-01 PROCEDURE — 85007 BL SMEAR W/DIFF WBC COUNT: CPT | Performed by: ORTHOPAEDIC SURGERY

## 2022-01-01 PROCEDURE — 80048 BASIC METABOLIC PNL TOTAL CA: CPT | Performed by: NURSE PRACTITIONER

## 2022-01-01 PROCEDURE — 71275 CT ANGIOGRAPHY CHEST: CPT

## 2022-01-01 PROCEDURE — 97535 SELF CARE MNGMENT TRAINING: CPT

## 2022-01-01 PROCEDURE — 0QS736Z REPOSITION LEFT UPPER FEMUR WITH INTRAMEDULLARY INTERNAL FIXATION DEVICE, PERCUTANEOUS APPROACH: ICD-10-PCS | Performed by: ORTHOPAEDIC SURGERY

## 2022-01-01 PROCEDURE — 87040 BLOOD CULTURE FOR BACTERIA: CPT | Performed by: INTERNAL MEDICINE

## 2022-01-01 PROCEDURE — 72170 X-RAY EXAM OF PELVIS: CPT

## 2022-01-01 PROCEDURE — 83516 IMMUNOASSAY NONANTIBODY: CPT | Performed by: INTERNAL MEDICINE

## 2022-01-01 PROCEDURE — 94799 UNLISTED PULMONARY SVC/PX: CPT

## 2022-01-01 PROCEDURE — 87070 CULTURE OTHR SPECIMN AEROBIC: CPT | Performed by: INTERNAL MEDICINE

## 2022-01-01 PROCEDURE — 25010000002 CEFTRIAXONE PER 250 MG: Performed by: INTERNAL MEDICINE

## 2022-01-01 PROCEDURE — 25010000002 MORPHINE PER 10 MG: Performed by: ORTHOPAEDIC SURGERY

## 2022-01-01 PROCEDURE — 0 POTASSIUM CHLORIDE 10 MEQ/100ML SOLUTION: Performed by: ORTHOPAEDIC SURGERY

## 2022-01-01 PROCEDURE — 63710000001 PREDNISONE PER 1 MG: Performed by: FAMILY MEDICINE

## 2022-01-01 PROCEDURE — 99232 SBSQ HOSP IP/OBS MODERATE 35: CPT | Performed by: FAMILY MEDICINE

## 2022-01-01 PROCEDURE — 85007 BL SMEAR W/DIFF WBC COUNT: CPT | Performed by: INTERNAL MEDICINE

## 2022-01-01 PROCEDURE — 25010000002 HEPARIN (PORCINE) PER 1000 UNITS: Performed by: PHYSICIAN ASSISTANT

## 2022-01-01 PROCEDURE — 83036 HEMOGLOBIN GLYCOSYLATED A1C: CPT | Performed by: PHYSICIAN ASSISTANT

## 2022-01-01 PROCEDURE — 93005 ELECTROCARDIOGRAM TRACING: CPT | Performed by: EMERGENCY MEDICINE

## 2022-01-01 PROCEDURE — 27245 TREAT THIGH FRACTURE: CPT | Performed by: ORTHOPAEDIC SURGERY

## 2022-01-01 PROCEDURE — 25010000002 METHYLPREDNISOLONE PER 125 MG: Performed by: INTERNAL MEDICINE

## 2022-01-01 PROCEDURE — 97116 GAIT TRAINING THERAPY: CPT

## 2022-01-01 PROCEDURE — 94761 N-INVAS EAR/PLS OXIMETRY MLT: CPT

## 2022-01-01 PROCEDURE — 25010000002 ENOXAPARIN PER 10 MG: Performed by: NURSE PRACTITIONER

## 2022-01-01 PROCEDURE — 87641 MR-STAPH DNA AMP PROBE: CPT | Performed by: PHYSICIAN ASSISTANT

## 2022-01-01 PROCEDURE — 97165 OT EVAL LOW COMPLEX 30 MIN: CPT | Performed by: OCCUPATIONAL THERAPIST

## 2022-01-01 PROCEDURE — P9035 PLATELET PHERES LEUKOREDUCED: HCPCS

## 2022-01-01 PROCEDURE — 94660 CPAP INITIATION&MGMT: CPT

## 2022-01-01 PROCEDURE — 82805 BLOOD GASES W/O2 SATURATION: CPT

## 2022-01-01 PROCEDURE — 99232 SBSQ HOSP IP/OBS MODERATE 35: CPT | Performed by: INTERNAL MEDICINE

## 2022-01-01 PROCEDURE — 99239 HOSP IP/OBS DSCHRG MGMT >30: CPT | Performed by: NURSE PRACTITIONER

## 2022-01-01 PROCEDURE — 96365 THER/PROPH/DIAG IV INF INIT: CPT

## 2022-01-01 PROCEDURE — 94640 AIRWAY INHALATION TREATMENT: CPT

## 2022-01-01 PROCEDURE — 87635 SARS-COV-2 COVID-19 AMP PRB: CPT | Performed by: EMERGENCY MEDICINE

## 2022-01-01 PROCEDURE — 86015 ACTIN ANTIBODY EACH: CPT | Performed by: INTERNAL MEDICINE

## 2022-01-01 PROCEDURE — 97530 THERAPEUTIC ACTIVITIES: CPT

## 2022-01-01 PROCEDURE — 82375 ASSAY CARBOXYHB QUANT: CPT

## 2022-01-01 PROCEDURE — 99223 1ST HOSP IP/OBS HIGH 75: CPT | Performed by: INTERNAL MEDICINE

## 2022-01-01 PROCEDURE — 82962 GLUCOSE BLOOD TEST: CPT

## 2022-01-01 PROCEDURE — 83735 ASSAY OF MAGNESIUM: CPT | Performed by: NURSE PRACTITIONER

## 2022-01-01 PROCEDURE — 80053 COMPREHEN METABOLIC PANEL: CPT | Performed by: EMERGENCY MEDICINE

## 2022-01-01 PROCEDURE — 74230 X-RAY XM SWLNG FUNCJ C+: CPT

## 2022-01-01 PROCEDURE — 80048 BASIC METABOLIC PNL TOTAL CA: CPT | Performed by: INTERNAL MEDICINE

## 2022-01-01 PROCEDURE — 70450 CT HEAD/BRAIN W/O DYE: CPT

## 2022-01-01 PROCEDURE — 25010000002 PROPOFOL 10 MG/ML EMULSION: Performed by: NURSE ANESTHETIST, CERTIFIED REGISTERED

## 2022-01-01 PROCEDURE — 96367 TX/PROPH/DG ADDL SEQ IV INF: CPT

## 2022-01-01 PROCEDURE — 93010 ELECTROCARDIOGRAM REPORT: CPT | Performed by: INTERNAL MEDICINE

## 2022-01-01 PROCEDURE — 86038 ANTINUCLEAR ANTIBODIES: CPT | Performed by: INTERNAL MEDICINE

## 2022-01-01 PROCEDURE — 80053 COMPREHEN METABOLIC PANEL: CPT | Performed by: INTERNAL MEDICINE

## 2022-01-01 PROCEDURE — 83880 ASSAY OF NATRIURETIC PEPTIDE: CPT | Performed by: EMERGENCY MEDICINE

## 2022-01-01 PROCEDURE — 25010000002 LEVETIRACETAM IN NACL 0.82% 500 MG/100ML SOLUTION: Performed by: ORTHOPAEDIC SURGERY

## 2022-01-01 PROCEDURE — 86140 C-REACTIVE PROTEIN: CPT | Performed by: PHYSICIAN ASSISTANT

## 2022-01-01 PROCEDURE — C1713 ANCHOR/SCREW BN/BN,TIS/BN: HCPCS | Performed by: ORTHOPAEDIC SURGERY

## 2022-01-01 PROCEDURE — 0 POTASSIUM CHLORIDE 10 MEQ/100ML SOLUTION: Performed by: INTERNAL MEDICINE

## 2022-01-01 PROCEDURE — 86037 ANCA TITER EACH ANTIBODY: CPT | Performed by: INTERNAL MEDICINE

## 2022-01-01 PROCEDURE — 71045 X-RAY EXAM CHEST 1 VIEW: CPT

## 2022-01-01 PROCEDURE — 86901 BLOOD TYPING SEROLOGIC RH(D): CPT | Performed by: EMERGENCY MEDICINE

## 2022-01-01 PROCEDURE — 82784 ASSAY IGA/IGD/IGG/IGM EACH: CPT | Performed by: INTERNAL MEDICINE

## 2022-01-01 PROCEDURE — 85610 PROTHROMBIN TIME: CPT | Performed by: ORTHOPAEDIC SURGERY

## 2022-01-01 PROCEDURE — 84132 ASSAY OF SERUM POTASSIUM: CPT | Performed by: INTERNAL MEDICINE

## 2022-01-01 PROCEDURE — 85027 COMPLETE CBC AUTOMATED: CPT

## 2022-01-01 PROCEDURE — 97110 THERAPEUTIC EXERCISES: CPT

## 2022-01-01 PROCEDURE — 83605 ASSAY OF LACTIC ACID: CPT | Performed by: EMERGENCY MEDICINE

## 2022-01-01 PROCEDURE — 25010000002 CEFTRIAXONE PER 250 MG: Performed by: NURSE PRACTITIONER

## 2022-01-01 PROCEDURE — 99283 EMERGENCY DEPT VISIT LOW MDM: CPT

## 2022-01-01 PROCEDURE — 99024 POSTOP FOLLOW-UP VISIT: CPT | Performed by: ORTHOPAEDIC SURGERY

## 2022-01-01 PROCEDURE — 97165 OT EVAL LOW COMPLEX 30 MIN: CPT

## 2022-01-01 PROCEDURE — 99284 EMERGENCY DEPT VISIT MOD MDM: CPT

## 2022-01-01 PROCEDURE — 99221 1ST HOSP IP/OBS SF/LOW 40: CPT | Performed by: INTERNAL MEDICINE

## 2022-01-01 PROCEDURE — 25010000002 METHYLPREDNISOLONE PER 40 MG: Performed by: NURSE PRACTITIONER

## 2022-01-01 PROCEDURE — C1769 GUIDE WIRE: HCPCS | Performed by: ORTHOPAEDIC SURGERY

## 2022-01-01 PROCEDURE — 25010000002 CEFEPIME PER 500 MG: Performed by: ORTHOPAEDIC SURGERY

## 2022-01-01 PROCEDURE — 99233 SBSQ HOSP IP/OBS HIGH 50: CPT | Performed by: INTERNAL MEDICINE

## 2022-01-01 PROCEDURE — 85362 FIBRIN DEGRADATION PRODUCTS: CPT | Performed by: INTERNAL MEDICINE

## 2022-01-01 PROCEDURE — 95816 EEG AWAKE AND DROWSY: CPT

## 2022-01-01 PROCEDURE — 85027 COMPLETE CBC AUTOMATED: CPT | Performed by: INTERNAL MEDICINE

## 2022-01-01 PROCEDURE — 92610 EVALUATE SWALLOWING FUNCTION: CPT

## 2022-01-01 PROCEDURE — 0 IOPAMIDOL PER 1 ML: Performed by: EMERGENCY MEDICINE

## 2022-01-01 PROCEDURE — 25010000002 CEFTRIAXONE PER 250 MG: Performed by: EMERGENCY MEDICINE

## 2022-01-01 PROCEDURE — 83050 HGB METHEMOGLOBIN QUAN: CPT

## 2022-01-01 PROCEDURE — 86850 RBC ANTIBODY SCREEN: CPT | Performed by: EMERGENCY MEDICINE

## 2022-01-01 PROCEDURE — 63710000001 PREDNISONE PER 5 MG: Performed by: FAMILY MEDICINE

## 2022-01-01 PROCEDURE — P9612 CATHETERIZE FOR URINE SPEC: HCPCS

## 2022-01-01 PROCEDURE — 25010000002 MORPHINE PER 10 MG: Performed by: INTERNAL MEDICINE

## 2022-01-01 PROCEDURE — 73502 X-RAY EXAM HIP UNI 2-3 VIEWS: CPT

## 2022-01-01 PROCEDURE — P9100 PATHOGEN TEST FOR PLATELETS: HCPCS

## 2022-01-01 PROCEDURE — G0299 HHS/HOSPICE OF RN EA 15 MIN: HCPCS

## 2022-01-01 PROCEDURE — 25010000002 LEVETIRACETAM IN NACL 0.82% 500 MG/100ML SOLUTION: Performed by: INTERNAL MEDICINE

## 2022-01-01 PROCEDURE — 25010000002 FUROSEMIDE PER 20 MG: Performed by: INTERNAL MEDICINE

## 2022-01-01 PROCEDURE — 87040 BLOOD CULTURE FOR BACTERIA: CPT | Performed by: EMERGENCY MEDICINE

## 2022-01-01 PROCEDURE — 87641 MR-STAPH DNA AMP PROBE: CPT | Performed by: NURSE PRACTITIONER

## 2022-01-01 PROCEDURE — 97535 SELF CARE MNGMENT TRAINING: CPT | Performed by: OCCUPATIONAL THERAPIST

## 2022-01-01 PROCEDURE — 93005 ELECTROCARDIOGRAM TRACING: CPT | Performed by: NURSE PRACTITIONER

## 2022-01-01 PROCEDURE — 85007 BL SMEAR W/DIFF WBC COUNT: CPT | Performed by: EMERGENCY MEDICINE

## 2022-01-01 PROCEDURE — 99232 SBSQ HOSP IP/OBS MODERATE 35: CPT | Performed by: NURSE PRACTITIONER

## 2022-01-01 PROCEDURE — 36600 WITHDRAWAL OF ARTERIAL BLOOD: CPT

## 2022-01-01 PROCEDURE — U0004 COV-19 TEST NON-CDC HGH THRU: HCPCS | Performed by: HOSPITALIST

## 2022-01-01 PROCEDURE — 83735 ASSAY OF MAGNESIUM: CPT | Performed by: INTERNAL MEDICINE

## 2022-01-01 PROCEDURE — 80053 COMPREHEN METABOLIC PANEL: CPT | Performed by: ORTHOPAEDIC SURGERY

## 2022-01-01 PROCEDURE — 99232 SBSQ HOSP IP/OBS MODERATE 35: CPT | Performed by: HOSPITALIST

## 2022-01-01 PROCEDURE — 92526 ORAL FUNCTION THERAPY: CPT

## 2022-01-01 PROCEDURE — 87899 AGENT NOS ASSAY W/OPTIC: CPT | Performed by: PHYSICIAN ASSISTANT

## 2022-01-01 PROCEDURE — 97161 PT EVAL LOW COMPLEX 20 MIN: CPT

## 2022-01-01 PROCEDURE — 99285 EMERGENCY DEPT VISIT HI MDM: CPT

## 2022-01-01 PROCEDURE — 85025 COMPLETE CBC W/AUTO DIFF WBC: CPT | Performed by: INTERNAL MEDICINE

## 2022-01-01 PROCEDURE — 85610 PROTHROMBIN TIME: CPT | Performed by: EMERGENCY MEDICINE

## 2022-01-01 PROCEDURE — 85025 COMPLETE CBC W/AUTO DIFF WBC: CPT | Performed by: PHYSICIAN ASSISTANT

## 2022-01-01 PROCEDURE — 25010000002 CEFTRIAXONE PER 250 MG: Performed by: PHYSICIAN ASSISTANT

## 2022-01-01 PROCEDURE — 80048 BASIC METABOLIC PNL TOTAL CA: CPT | Performed by: PHYSICIAN ASSISTANT

## 2022-01-01 PROCEDURE — 87899 AGENT NOS ASSAY W/OPTIC: CPT | Performed by: NURSE PRACTITIONER

## 2022-01-01 PROCEDURE — U0005 INFEC AGEN DETEC AMPLI PROBE: HCPCS | Performed by: HOSPITALIST

## 2022-01-01 PROCEDURE — 81003 URINALYSIS AUTO W/O SCOPE: CPT | Performed by: INTERNAL MEDICINE

## 2022-01-01 PROCEDURE — 85025 COMPLETE CBC W/AUTO DIFF WBC: CPT | Performed by: NURSE PRACTITIONER

## 2022-01-01 PROCEDURE — 99231 SBSQ HOSP IP/OBS SF/LOW 25: CPT | Performed by: FAMILY MEDICINE

## 2022-01-01 PROCEDURE — 76705 ECHO EXAM OF ABDOMEN: CPT

## 2022-01-01 PROCEDURE — G0432 EIA HIV-1/HIV-2 SCREEN: HCPCS | Performed by: INTERNAL MEDICINE

## 2022-01-01 PROCEDURE — 97166 OT EVAL MOD COMPLEX 45 MIN: CPT

## 2022-01-01 PROCEDURE — 25010000002 DEXAMETHASONE PER 1 MG: Performed by: NURSE ANESTHETIST, CERTIFIED REGISTERED

## 2022-01-01 PROCEDURE — 83735 ASSAY OF MAGNESIUM: CPT | Performed by: EMERGENCY MEDICINE

## 2022-01-01 PROCEDURE — 25010000002 ONDANSETRON PER 1 MG: Performed by: EMERGENCY MEDICINE

## 2022-01-01 PROCEDURE — 25010000002 FENTANYL CITRATE (PF) 50 MCG/ML SOLUTION: Performed by: NURSE ANESTHETIST, CERTIFIED REGISTERED

## 2022-01-01 PROCEDURE — 25010000002 METHYLPREDNISOLONE PER 40 MG: Performed by: HOSPITALIST

## 2022-01-01 PROCEDURE — 85730 THROMBOPLASTIN TIME PARTIAL: CPT | Performed by: INTERNAL MEDICINE

## 2022-01-01 PROCEDURE — 25010000002 CEFEPIME PER 500 MG: Performed by: INTERNAL MEDICINE

## 2022-01-01 PROCEDURE — 99222 1ST HOSP IP/OBS MODERATE 55: CPT | Performed by: ORTHOPAEDIC SURGERY

## 2022-01-01 PROCEDURE — 71046 X-RAY EXAM CHEST 2 VIEWS: CPT

## 2022-01-01 PROCEDURE — 86900 BLOOD TYPING SEROLOGIC ABO: CPT | Performed by: EMERGENCY MEDICINE

## 2022-01-01 PROCEDURE — 85025 COMPLETE CBC W/AUTO DIFF WBC: CPT | Performed by: EMERGENCY MEDICINE

## 2022-01-01 PROCEDURE — 87635 SARS-COV-2 COVID-19 AMP PRB: CPT | Performed by: INTERNAL MEDICINE

## 2022-01-01 PROCEDURE — 0 POTASSIUM CHLORIDE 10 MEQ/100ML SOLUTION: Performed by: EMERGENCY MEDICINE

## 2022-01-01 PROCEDURE — 87636 SARSCOV2 & INF A&B AMP PRB: CPT | Performed by: PHYSICIAN ASSISTANT

## 2022-01-01 PROCEDURE — 84484 ASSAY OF TROPONIN QUANT: CPT | Performed by: EMERGENCY MEDICINE

## 2022-01-01 PROCEDURE — 85060 BLOOD SMEAR INTERPRETATION: CPT | Performed by: INTERNAL MEDICINE

## 2022-01-01 PROCEDURE — 84145 PROCALCITONIN (PCT): CPT | Performed by: EMERGENCY MEDICINE

## 2022-01-01 PROCEDURE — 93306 TTE W/DOPPLER COMPLETE: CPT | Performed by: INTERNAL MEDICINE

## 2022-01-01 PROCEDURE — 93005 ELECTROCARDIOGRAM TRACING: CPT | Performed by: INTERNAL MEDICINE

## 2022-01-01 PROCEDURE — 85049 AUTOMATED PLATELET COUNT: CPT | Performed by: INTERNAL MEDICINE

## 2022-01-01 PROCEDURE — 92611 MOTION FLUOROSCOPY/SWALLOW: CPT

## 2022-01-01 PROCEDURE — 82728 ASSAY OF FERRITIN: CPT | Performed by: INTERNAL MEDICINE

## 2022-01-01 PROCEDURE — 0202U NFCT DS 22 TRGT SARS-COV-2: CPT | Performed by: PHYSICIAN ASSISTANT

## 2022-01-01 PROCEDURE — 76000 FLUOROSCOPY <1 HR PHYS/QHP: CPT

## 2022-01-01 PROCEDURE — 0202U NFCT DS 22 TRGT SARS-COV-2: CPT | Performed by: EMERGENCY MEDICINE

## 2022-01-01 PROCEDURE — 87040 BLOOD CULTURE FOR BACTERIA: CPT | Performed by: PHYSICIAN ASSISTANT

## 2022-01-01 PROCEDURE — 97162 PT EVAL MOD COMPLEX 30 MIN: CPT

## 2022-01-01 PROCEDURE — 84145 PROCALCITONIN (PCT): CPT | Performed by: PHYSICIAN ASSISTANT

## 2022-01-01 PROCEDURE — 25010000002 CEFAZOLIN IN DEXTROSE 2-4 GM/100ML-% SOLUTION: Performed by: ORTHOPAEDIC SURGERY

## 2022-01-01 PROCEDURE — 83605 ASSAY OF LACTIC ACID: CPT | Performed by: PHYSICIAN ASSISTANT

## 2022-01-01 PROCEDURE — 85007 BL SMEAR W/DIFF WBC COUNT: CPT | Performed by: NURSE PRACTITIONER

## 2022-01-01 PROCEDURE — 85610 PROTHROMBIN TIME: CPT | Performed by: INTERNAL MEDICINE

## 2022-01-01 PROCEDURE — 0 MAGNESIUM SULFATE 4 GM/100ML SOLUTION: Performed by: NURSE PRACTITIONER

## 2022-01-01 PROCEDURE — 85025 COMPLETE CBC W/AUTO DIFF WBC: CPT | Performed by: ORTHOPAEDIC SURGERY

## 2022-01-01 PROCEDURE — 93005 ELECTROCARDIOGRAM TRACING: CPT

## 2022-01-01 PROCEDURE — 80074 ACUTE HEPATITIS PANEL: CPT | Performed by: INTERNAL MEDICINE

## 2022-01-01 PROCEDURE — 86140 C-REACTIVE PROTEIN: CPT | Performed by: INTERNAL MEDICINE

## 2022-01-01 PROCEDURE — 81001 URINALYSIS AUTO W/SCOPE: CPT | Performed by: EMERGENCY MEDICINE

## 2022-01-01 PROCEDURE — 85379 FIBRIN DEGRADATION QUANT: CPT | Performed by: INTERNAL MEDICINE

## 2022-01-01 PROCEDURE — 99239 HOSP IP/OBS DSCHRG MGMT >30: CPT | Performed by: HOSPITALIST

## 2022-01-01 PROCEDURE — 71250 CT THORAX DX C-: CPT

## 2022-01-01 PROCEDURE — 36430 TRANSFUSION BLD/BLD COMPNT: CPT

## 2022-01-01 PROCEDURE — 0 POTASSIUM CHLORIDE 10 MEQ/100ML SOLUTION: Performed by: PHYSICIAN ASSISTANT

## 2022-01-01 PROCEDURE — 87205 SMEAR GRAM STAIN: CPT | Performed by: INTERNAL MEDICINE

## 2022-01-01 PROCEDURE — 83615 LACTATE (LD) (LDH) ENZYME: CPT | Performed by: PHYSICIAN ASSISTANT

## 2022-01-01 PROCEDURE — 85384 FIBRINOGEN ACTIVITY: CPT | Performed by: INTERNAL MEDICINE

## 2022-01-01 PROCEDURE — 86381 MITOCHONDRIAL ANTIBODY EACH: CPT | Performed by: INTERNAL MEDICINE

## 2022-01-01 PROCEDURE — 82550 ASSAY OF CK (CPK): CPT | Performed by: INTERNAL MEDICINE

## 2022-01-01 PROCEDURE — 25010000002 HYDROMORPHONE PER 4 MG: Performed by: EMERGENCY MEDICINE

## 2022-01-01 PROCEDURE — 84145 PROCALCITONIN (PCT): CPT | Performed by: NURSE PRACTITIONER

## 2022-01-01 PROCEDURE — 83605 ASSAY OF LACTIC ACID: CPT | Performed by: NURSE PRACTITIONER

## 2022-01-01 PROCEDURE — 83615 LACTATE (LD) (LDH) ENZYME: CPT | Performed by: INTERNAL MEDICINE

## 2022-01-01 PROCEDURE — 25010000002 MAGNESIUM SULFATE IN D5W 1G/100ML (PREMIX) 1-5 GM/100ML-% SOLUTION: Performed by: EMERGENCY MEDICINE

## 2022-01-01 PROCEDURE — 80076 HEPATIC FUNCTION PANEL: CPT | Performed by: INTERNAL MEDICINE

## 2022-01-01 PROCEDURE — 73552 X-RAY EXAM OF FEMUR 2/>: CPT

## 2022-01-01 PROCEDURE — 87529 HSV DNA AMP PROBE: CPT | Performed by: INTERNAL MEDICINE

## 2022-01-01 PROCEDURE — 36415 COLL VENOUS BLD VENIPUNCTURE: CPT

## 2022-01-01 PROCEDURE — 84132 ASSAY OF SERUM POTASSIUM: CPT | Performed by: ANESTHESIOLOGY

## 2022-01-01 DEVICE — NAIL FEM TFN ADV PROX 130D SHT 11X170MM STRL: Type: IMPLANTABLE DEVICE | Site: HIP | Status: FUNCTIONAL

## 2022-01-01 DEVICE — BLD FEM FIX HELI TFN ADV PERF 95MM STRL: Type: IMPLANTABLE DEVICE | Site: HIP | Status: FUNCTIONAL

## 2022-01-01 DEVICE — SCRW LK STRDRV TI 5X36MM STRL: Type: IMPLANTABLE DEVICE | Site: HIP | Status: FUNCTIONAL

## 2022-01-01 RX ORDER — HYDROCODONE BITARTRATE AND ACETAMINOPHEN 5; 325 MG/1; MG/1
1 TABLET ORAL ONCE AS NEEDED
Status: DISCONTINUED | OUTPATIENT
Start: 2022-01-01 | End: 2022-01-01 | Stop reason: HOSPADM

## 2022-01-01 RX ORDER — LORAZEPAM 1 MG/1
1 TABLET ORAL EVERY 8 HOURS PRN
Qty: 9 TABLET | Refills: 0 | Status: SHIPPED | OUTPATIENT
Start: 2022-01-01 | End: 2022-01-01

## 2022-01-01 RX ORDER — POTASSIUM CHLORIDE 7.45 MG/ML
10 INJECTION INTRAVENOUS
Status: DISCONTINUED | OUTPATIENT
Start: 2022-01-01 | End: 2022-01-01 | Stop reason: HOSPADM

## 2022-01-01 RX ORDER — NICOTINE POLACRILEX 4 MG
15 LOZENGE BUCCAL
Status: DISCONTINUED | OUTPATIENT
Start: 2022-01-01 | End: 2022-01-01 | Stop reason: HOSPADM

## 2022-01-01 RX ORDER — DOXYCYCLINE 100 MG/1
100 CAPSULE ORAL EVERY 12 HOURS SCHEDULED
Status: DISCONTINUED | OUTPATIENT
Start: 2022-01-01 | End: 2022-01-01 | Stop reason: HOSPADM

## 2022-01-01 RX ORDER — METHYLPREDNISOLONE SODIUM SUCCINATE 125 MG/2ML
80 INJECTION, POWDER, LYOPHILIZED, FOR SOLUTION INTRAMUSCULAR; INTRAVENOUS ONCE
Status: COMPLETED | OUTPATIENT
Start: 2022-01-01 | End: 2022-01-01

## 2022-01-01 RX ORDER — MAGNESIUM HYDROXIDE 1200 MG/15ML
LIQUID ORAL AS NEEDED
Status: DISCONTINUED | OUTPATIENT
Start: 2022-01-01 | End: 2022-01-01 | Stop reason: HOSPADM

## 2022-01-01 RX ORDER — CHOLECALCIFEROL (VITAMIN D3) 125 MCG
5 CAPSULE ORAL NIGHTLY PRN
Status: DISCONTINUED | OUTPATIENT
Start: 2022-01-01 | End: 2022-01-01 | Stop reason: HOSPADM

## 2022-01-01 RX ORDER — MORPHINE SULFATE 2 MG/ML
1 INJECTION, SOLUTION INTRAMUSCULAR; INTRAVENOUS EVERY 4 HOURS PRN
Status: DISCONTINUED | OUTPATIENT
Start: 2022-01-01 | End: 2022-01-01 | Stop reason: HOSPADM

## 2022-01-01 RX ORDER — L.ACID,PARA/B.BIFIDUM/S.THERM 8B CELL
1 CAPSULE ORAL DAILY
Start: 2022-01-01

## 2022-01-01 RX ORDER — AMOXICILLIN 250 MG
2 CAPSULE ORAL 2 TIMES DAILY
Status: DISCONTINUED | OUTPATIENT
Start: 2022-01-01 | End: 2022-01-01 | Stop reason: HOSPADM

## 2022-01-01 RX ORDER — BUPIVACAINE HCL/0.9 % NACL/PF 0.125 %
PLASTIC BAG, INJECTION (ML) EPIDURAL AS NEEDED
Status: DISCONTINUED | OUTPATIENT
Start: 2022-01-01 | End: 2022-01-01 | Stop reason: SURG

## 2022-01-01 RX ORDER — FENTANYL CITRATE 50 UG/ML
INJECTION, SOLUTION INTRAMUSCULAR; INTRAVENOUS AS NEEDED
Status: DISCONTINUED | OUTPATIENT
Start: 2022-01-01 | End: 2022-01-01 | Stop reason: SURG

## 2022-01-01 RX ORDER — FUROSEMIDE 10 MG/ML
40 INJECTION INTRAMUSCULAR; INTRAVENOUS ONCE
Status: COMPLETED | OUTPATIENT
Start: 2022-01-01 | End: 2022-01-01

## 2022-01-01 RX ORDER — ROPIVACAINE HYDROCHLORIDE 5 MG/ML
INJECTION, SOLUTION EPIDURAL; INFILTRATION; PERINEURAL
Status: COMPLETED | OUTPATIENT
Start: 2022-01-01 | End: 2022-01-01

## 2022-01-01 RX ORDER — SODIUM CHLORIDE 0.9 % (FLUSH) 0.9 %
10 SYRINGE (ML) INJECTION AS NEEDED
Status: DISCONTINUED | OUTPATIENT
Start: 2022-01-01 | End: 2022-01-01 | Stop reason: HOSPADM

## 2022-01-01 RX ORDER — LIDOCAINE HYDROCHLORIDE 10 MG/ML
0.5 INJECTION, SOLUTION EPIDURAL; INFILTRATION; INTRACAUDAL; PERINEURAL ONCE AS NEEDED
Status: DISCONTINUED | OUTPATIENT
Start: 2022-01-01 | End: 2022-01-01 | Stop reason: HOSPADM

## 2022-01-01 RX ORDER — LORAZEPAM 0.5 MG/1
0.5 TABLET ORAL EVERY 6 HOURS PRN
Status: DISCONTINUED | OUTPATIENT
Start: 2022-01-01 | End: 2022-01-01

## 2022-01-01 RX ORDER — LEVETIRACETAM 500 MG/1
500 TABLET ORAL EVERY 12 HOURS SCHEDULED
Status: DISCONTINUED | OUTPATIENT
Start: 2022-01-01 | End: 2022-01-01 | Stop reason: HOSPADM

## 2022-01-01 RX ORDER — POTASSIUM CHLORIDE 1.5 G/1.77G
40 POWDER, FOR SOLUTION ORAL AS NEEDED
Status: DISCONTINUED | OUTPATIENT
Start: 2022-01-01 | End: 2022-01-01 | Stop reason: HOSPADM

## 2022-01-01 RX ORDER — LIDOCAINE HYDROCHLORIDE 10 MG/ML
INJECTION, SOLUTION EPIDURAL; INFILTRATION; INTRACAUDAL; PERINEURAL AS NEEDED
Status: DISCONTINUED | OUTPATIENT
Start: 2022-01-01 | End: 2022-01-01 | Stop reason: SURG

## 2022-01-01 RX ORDER — BUSPIRONE HYDROCHLORIDE 10 MG/1
10 TABLET ORAL 3 TIMES DAILY
Status: DISCONTINUED | OUTPATIENT
Start: 2022-01-01 | End: 2022-01-01 | Stop reason: HOSPADM

## 2022-01-01 RX ORDER — SODIUM CHLORIDE, SODIUM LACTATE, POTASSIUM CHLORIDE, CALCIUM CHLORIDE 600; 310; 30; 20 MG/100ML; MG/100ML; MG/100ML; MG/100ML
9 INJECTION, SOLUTION INTRAVENOUS CONTINUOUS
Status: DISCONTINUED | OUTPATIENT
Start: 2022-01-01 | End: 2022-01-01 | Stop reason: HOSPADM

## 2022-01-01 RX ORDER — PREDNISONE 20 MG/1
40 TABLET ORAL
Status: DISCONTINUED | OUTPATIENT
Start: 2022-01-01 | End: 2022-01-01

## 2022-01-01 RX ORDER — LEVETIRACETAM 5 MG/ML
500 INJECTION INTRAVASCULAR EVERY 12 HOURS SCHEDULED
Status: DISCONTINUED | OUTPATIENT
Start: 2022-01-01 | End: 2022-01-01 | Stop reason: HOSPADM

## 2022-01-01 RX ORDER — BUDESONIDE AND FORMOTEROL FUMARATE DIHYDRATE 80; 4.5 UG/1; UG/1
2 AEROSOL RESPIRATORY (INHALATION)
Status: DISCONTINUED | OUTPATIENT
Start: 2022-01-01 | End: 2022-01-01 | Stop reason: HOSPADM

## 2022-01-01 RX ORDER — PREDNISONE 20 MG/1
20 TABLET ORAL DAILY
Qty: 4 TABLET | Refills: 0 | Status: SHIPPED | OUTPATIENT
Start: 2022-01-01 | End: 2022-01-01

## 2022-01-01 RX ORDER — SODIUM CHLORIDE 9 MG/ML
100 INJECTION, SOLUTION INTRAVENOUS CONTINUOUS
Status: ACTIVE | OUTPATIENT
Start: 2022-01-01 | End: 2022-01-01

## 2022-01-01 RX ORDER — TRIAMTERENE AND HYDROCHLOROTHIAZIDE 37.5; 25 MG/1; MG/1
1 TABLET ORAL DAILY
COMMUNITY
End: 2022-01-01

## 2022-01-01 RX ORDER — DEXAMETHASONE SODIUM PHOSPHATE 4 MG/ML
INJECTION, SOLUTION INTRA-ARTICULAR; INTRALESIONAL; INTRAMUSCULAR; INTRAVENOUS; SOFT TISSUE AS NEEDED
Status: DISCONTINUED | OUTPATIENT
Start: 2022-01-01 | End: 2022-01-01 | Stop reason: SURG

## 2022-01-01 RX ORDER — ACETAMINOPHEN 325 MG/1
650 TABLET ORAL EVERY 4 HOURS PRN
Status: DISCONTINUED | OUTPATIENT
Start: 2022-01-01 | End: 2022-01-01 | Stop reason: HOSPADM

## 2022-01-01 RX ORDER — ACETAMINOPHEN,DIPHENHYDRAMINE HCL 500; 25 MG/1; MG/1
1 TABLET, FILM COATED ORAL NIGHTLY PRN
COMMUNITY

## 2022-01-01 RX ORDER — PAROXETINE HYDROCHLORIDE 20 MG/1
20 TABLET, FILM COATED ORAL DAILY
Status: DISCONTINUED | OUTPATIENT
Start: 2022-01-01 | End: 2022-01-01 | Stop reason: HOSPADM

## 2022-01-01 RX ORDER — LEVETIRACETAM 500 MG/1
500 TABLET ORAL 2 TIMES DAILY
Status: DISCONTINUED | OUTPATIENT
Start: 2022-01-01 | End: 2022-01-01

## 2022-01-01 RX ORDER — DEXTROSE MONOHYDRATE 25 G/50ML
25 INJECTION, SOLUTION INTRAVENOUS
Status: DISCONTINUED | OUTPATIENT
Start: 2022-01-01 | End: 2022-01-01 | Stop reason: HOSPADM

## 2022-01-01 RX ORDER — FAMOTIDINE 10 MG/ML
INJECTION, SOLUTION INTRAVENOUS
Status: DISPENSED
Start: 2022-01-01 | End: 2022-01-01

## 2022-01-01 RX ORDER — IPRATROPIUM BROMIDE AND ALBUTEROL SULFATE 2.5; .5 MG/3ML; MG/3ML
3 SOLUTION RESPIRATORY (INHALATION)
Status: DISCONTINUED | OUTPATIENT
Start: 2022-01-01 | End: 2022-01-01 | Stop reason: HOSPADM

## 2022-01-01 RX ORDER — MIDAZOLAM HYDROCHLORIDE 1 MG/ML
0.5 INJECTION INTRAMUSCULAR; INTRAVENOUS
Status: DISCONTINUED | OUTPATIENT
Start: 2022-01-01 | End: 2022-01-01 | Stop reason: HOSPADM

## 2022-01-01 RX ORDER — LORAZEPAM 1 MG/1
1 TABLET ORAL EVERY 8 HOURS PRN
COMMUNITY

## 2022-01-01 RX ORDER — HYDROCODONE BITARTRATE AND ACETAMINOPHEN 5; 325 MG/1; MG/1
1 TABLET ORAL EVERY 4 HOURS PRN
Status: DISCONTINUED | OUTPATIENT
Start: 2022-01-01 | End: 2022-01-01

## 2022-01-01 RX ORDER — ONDANSETRON 4 MG/1
4 TABLET, FILM COATED ORAL EVERY 6 HOURS PRN
Status: DISCONTINUED | OUTPATIENT
Start: 2022-01-01 | End: 2022-01-01 | Stop reason: HOSPADM

## 2022-01-01 RX ORDER — CEFAZOLIN SODIUM 2 G/100ML
2 INJECTION, SOLUTION INTRAVENOUS
Status: COMPLETED | OUTPATIENT
Start: 2022-01-01 | End: 2022-01-01

## 2022-01-01 RX ORDER — POTASSIUM CHLORIDE 750 MG/1
10 TABLET, FILM COATED, EXTENDED RELEASE ORAL DAILY
Qty: 7 TABLET | Refills: 0 | Status: SHIPPED | OUTPATIENT
Start: 2022-01-01

## 2022-01-01 RX ORDER — AMLODIPINE BESYLATE 5 MG/1
5 TABLET ORAL DAILY
Status: DISCONTINUED | OUTPATIENT
Start: 2022-01-01 | End: 2022-01-01

## 2022-01-01 RX ORDER — AMLODIPINE BESYLATE 5 MG/1
5 TABLET ORAL DAILY
Status: DISCONTINUED | OUTPATIENT
Start: 2022-01-01 | End: 2022-01-01 | Stop reason: HOSPADM

## 2022-01-01 RX ORDER — SODIUM CHLORIDE 0.9 % (FLUSH) 0.9 %
10 SYRINGE (ML) INJECTION EVERY 12 HOURS SCHEDULED
Status: DISCONTINUED | OUTPATIENT
Start: 2022-01-01 | End: 2022-01-01 | Stop reason: HOSPADM

## 2022-01-01 RX ORDER — METHYLPREDNISOLONE SODIUM SUCCINATE 40 MG/ML
20 INJECTION, POWDER, LYOPHILIZED, FOR SOLUTION INTRAMUSCULAR; INTRAVENOUS EVERY 12 HOURS SCHEDULED
Status: DISCONTINUED | OUTPATIENT
Start: 2022-01-01 | End: 2022-01-01 | Stop reason: HOSPADM

## 2022-01-01 RX ORDER — POTASSIUM CHLORIDE 750 MG/1
40 CAPSULE, EXTENDED RELEASE ORAL AS NEEDED
Status: DISCONTINUED | OUTPATIENT
Start: 2022-01-01 | End: 2022-01-01 | Stop reason: HOSPADM

## 2022-01-01 RX ORDER — METHYLPREDNISOLONE SODIUM SUCCINATE 125 MG/2ML
60 INJECTION, POWDER, LYOPHILIZED, FOR SOLUTION INTRAMUSCULAR; INTRAVENOUS EVERY 8 HOURS
Status: DISCONTINUED | OUTPATIENT
Start: 2022-01-01 | End: 2022-01-01

## 2022-01-01 RX ORDER — SODIUM CHLORIDE 0.9 % (FLUSH) 0.9 %
3 SYRINGE (ML) INJECTION EVERY 12 HOURS SCHEDULED
Status: DISCONTINUED | OUTPATIENT
Start: 2022-01-01 | End: 2022-01-01 | Stop reason: HOSPADM

## 2022-01-01 RX ORDER — TRANEXAMIC ACID 10 MG/ML
1000 INJECTION, SOLUTION INTRAVENOUS ONCE
Status: DISCONTINUED | OUTPATIENT
Start: 2022-01-01 | End: 2022-01-01 | Stop reason: HOSPADM

## 2022-01-01 RX ORDER — DROPERIDOL 2.5 MG/ML
0.62 INJECTION, SOLUTION INTRAMUSCULAR; INTRAVENOUS
Status: DISCONTINUED | OUTPATIENT
Start: 2022-01-01 | End: 2022-01-01 | Stop reason: HOSPADM

## 2022-01-01 RX ORDER — L.ACID,PARA/B.BIFIDUM/S.THERM 8B CELL
1 CAPSULE ORAL DAILY
Status: DISCONTINUED | OUTPATIENT
Start: 2022-01-01 | End: 2022-01-01 | Stop reason: HOSPADM

## 2022-01-01 RX ORDER — CALCIUM CARBONATE 200(500)MG
2 TABLET,CHEWABLE ORAL 2 TIMES DAILY PRN
Status: DISCONTINUED | OUTPATIENT
Start: 2022-01-01 | End: 2022-01-01 | Stop reason: HOSPADM

## 2022-01-01 RX ORDER — METOPROLOL SUCCINATE 25 MG/1
25 TABLET, EXTENDED RELEASE ORAL DAILY
COMMUNITY

## 2022-01-01 RX ORDER — ONDANSETRON 2 MG/ML
4 INJECTION INTRAMUSCULAR; INTRAVENOUS ONCE AS NEEDED
Status: DISCONTINUED | OUTPATIENT
Start: 2022-01-01 | End: 2022-01-01 | Stop reason: HOSPADM

## 2022-01-01 RX ORDER — ACETAMINOPHEN 325 MG/1
650 TABLET ORAL EVERY 8 HOURS SCHEDULED
Status: DISCONTINUED | OUTPATIENT
Start: 2022-01-01 | End: 2022-01-01

## 2022-01-01 RX ORDER — FAMOTIDINE 20 MG/1
10 TABLET, FILM COATED ORAL
Status: DISCONTINUED | OUTPATIENT
Start: 2022-01-01 | End: 2022-01-01 | Stop reason: HOSPADM

## 2022-01-01 RX ORDER — MAGNESIUM SULFATE HEPTAHYDRATE 40 MG/ML
2 INJECTION, SOLUTION INTRAVENOUS AS NEEDED
Status: DISCONTINUED | OUTPATIENT
Start: 2022-01-01 | End: 2022-01-01 | Stop reason: HOSPADM

## 2022-01-01 RX ORDER — IPRATROPIUM BROMIDE AND ALBUTEROL SULFATE 2.5; .5 MG/3ML; MG/3ML
3 SOLUTION RESPIRATORY (INHALATION) EVERY 4 HOURS PRN
Status: DISCONTINUED | OUTPATIENT
Start: 2022-01-01 | End: 2022-01-01 | Stop reason: SDUPTHER

## 2022-01-01 RX ORDER — ONDANSETRON 2 MG/ML
4 INJECTION INTRAMUSCULAR; INTRAVENOUS EVERY 6 HOURS PRN
Status: DISCONTINUED | OUTPATIENT
Start: 2022-01-01 | End: 2022-01-01 | Stop reason: HOSPADM

## 2022-01-01 RX ORDER — POLYETHYLENE GLYCOL 3350 17 G/17G
17 POWDER, FOR SOLUTION ORAL DAILY PRN
Status: DISCONTINUED | OUTPATIENT
Start: 2022-01-01 | End: 2022-01-01 | Stop reason: HOSPADM

## 2022-01-01 RX ORDER — ROPIVACAINE HYDROCHLORIDE 2 MG/ML
INJECTION, SOLUTION EPIDURAL; INFILTRATION; PERINEURAL CONTINUOUS
Status: DISCONTINUED | OUTPATIENT
Start: 2022-01-01 | End: 2022-01-01 | Stop reason: HOSPADM

## 2022-01-01 RX ORDER — CEFAZOLIN SODIUM 2 G/100ML
2 INJECTION, SOLUTION INTRAVENOUS EVERY 8 HOURS
Status: CANCELLED | OUTPATIENT
Start: 2022-01-01 | End: 2022-01-01

## 2022-01-01 RX ORDER — NALOXONE HCL 0.4 MG/ML
0.4 VIAL (ML) INJECTION
Status: DISCONTINUED | OUTPATIENT
Start: 2022-01-01 | End: 2022-01-01 | Stop reason: HOSPADM

## 2022-01-01 RX ORDER — CEFAZOLIN SODIUM 2 G/100ML
2 INJECTION, SOLUTION INTRAVENOUS ONCE
Status: DISCONTINUED | OUTPATIENT
Start: 2022-01-01 | End: 2022-01-01

## 2022-01-01 RX ORDER — PREDNISONE 10 MG/1
30 TABLET ORAL
Start: 2022-01-01

## 2022-01-01 RX ORDER — MAGNESIUM SULFATE 1 G/100ML
1 INJECTION INTRAVENOUS ONCE
Status: COMPLETED | OUTPATIENT
Start: 2022-01-01 | End: 2022-01-01

## 2022-01-01 RX ORDER — TRANEXAMIC ACID 10 MG/ML
1000 INJECTION, SOLUTION INTRAVENOUS ONCE
Status: DISCONTINUED | OUTPATIENT
Start: 2022-01-01 | End: 2022-01-01

## 2022-01-01 RX ORDER — DROPERIDOL 2.5 MG/ML
0.62 INJECTION, SOLUTION INTRAMUSCULAR; INTRAVENOUS ONCE AS NEEDED
Status: DISCONTINUED | OUTPATIENT
Start: 2022-01-01 | End: 2022-01-01 | Stop reason: HOSPADM

## 2022-01-01 RX ORDER — POTASSIUM CHLORIDE 7.45 MG/ML
10 INJECTION INTRAVENOUS ONCE
Status: COMPLETED | OUTPATIENT
Start: 2022-01-01 | End: 2022-01-01

## 2022-01-01 RX ORDER — LEVOFLOXACIN 500 MG/1
500 TABLET, FILM COATED ORAL DAILY
COMMUNITY
Start: 2022-01-01 | End: 2022-07-13

## 2022-01-01 RX ORDER — LEVETIRACETAM 500 MG/1
500 TABLET ORAL EVERY 12 HOURS SCHEDULED
Status: DISCONTINUED | OUTPATIENT
Start: 2022-01-01 | End: 2022-01-01

## 2022-01-01 RX ORDER — NALOXONE HCL 0.4 MG/ML
0.4 VIAL (ML) INJECTION AS NEEDED
Status: DISCONTINUED | OUTPATIENT
Start: 2022-01-01 | End: 2022-01-01 | Stop reason: HOSPADM

## 2022-01-01 RX ORDER — AMLODIPINE BESYLATE 5 MG/1
5 TABLET ORAL
Qty: 30 TABLET | Refills: 0 | Status: SHIPPED | OUTPATIENT
Start: 2022-01-01 | End: 2022-01-01

## 2022-01-01 RX ORDER — TRIAMTERENE AND HYDROCHLOROTHIAZIDE 37.5; 25 MG/1; MG/1
1 TABLET ORAL 2 TIMES WEEKLY
Status: DISCONTINUED | OUTPATIENT
Start: 2022-01-01 | End: 2022-01-01 | Stop reason: HOSPADM

## 2022-01-01 RX ORDER — AMLODIPINE BESYLATE 5 MG/1
5 TABLET ORAL
Status: DISCONTINUED | OUTPATIENT
Start: 2022-01-01 | End: 2022-01-01 | Stop reason: HOSPADM

## 2022-01-01 RX ORDER — MAGNESIUM SULFATE HEPTAHYDRATE 40 MG/ML
4 INJECTION, SOLUTION INTRAVENOUS AS NEEDED
Status: DISCONTINUED | OUTPATIENT
Start: 2022-01-01 | End: 2022-01-01 | Stop reason: HOSPADM

## 2022-01-01 RX ORDER — METOPROLOL TARTRATE 5 MG/5ML
5 INJECTION INTRAVENOUS ONCE
Status: COMPLETED | OUTPATIENT
Start: 2022-01-01 | End: 2022-01-01

## 2022-01-01 RX ORDER — PROMETHAZINE HYDROCHLORIDE 25 MG/1
25 SUPPOSITORY RECTAL ONCE AS NEEDED
Status: DISCONTINUED | OUTPATIENT
Start: 2022-01-01 | End: 2022-01-01 | Stop reason: HOSPADM

## 2022-01-01 RX ORDER — HYDROMORPHONE HYDROCHLORIDE 1 MG/ML
0.25 INJECTION, SOLUTION INTRAMUSCULAR; INTRAVENOUS; SUBCUTANEOUS
Status: DISCONTINUED | OUTPATIENT
Start: 2022-01-01 | End: 2022-01-01 | Stop reason: HOSPADM

## 2022-01-01 RX ORDER — METOPROLOL SUCCINATE 25 MG/1
25 TABLET, EXTENDED RELEASE ORAL DAILY
Status: DISCONTINUED | OUTPATIENT
Start: 2022-01-01 | End: 2022-01-01 | Stop reason: HOSPADM

## 2022-01-01 RX ORDER — IPRATROPIUM BROMIDE AND ALBUTEROL SULFATE 2.5; .5 MG/3ML; MG/3ML
3 SOLUTION RESPIRATORY (INHALATION)
Status: DISCONTINUED | OUTPATIENT
Start: 2022-01-01 | End: 2022-01-01

## 2022-01-01 RX ORDER — IPRATROPIUM BROMIDE AND ALBUTEROL SULFATE 2.5; .5 MG/3ML; MG/3ML
3 SOLUTION RESPIRATORY (INHALATION) EVERY 4 HOURS PRN
Status: DISCONTINUED | OUTPATIENT
Start: 2022-01-01 | End: 2022-01-01 | Stop reason: HOSPADM

## 2022-01-01 RX ORDER — PROPOFOL 10 MG/ML
VIAL (ML) INTRAVENOUS AS NEEDED
Status: DISCONTINUED | OUTPATIENT
Start: 2022-01-01 | End: 2022-01-01 | Stop reason: SURG

## 2022-01-01 RX ORDER — ENOXAPARIN SODIUM 100 MG/ML
40 INJECTION SUBCUTANEOUS EVERY 24 HOURS
Status: DISCONTINUED | OUTPATIENT
Start: 2022-01-01 | End: 2022-01-01 | Stop reason: HOSPADM

## 2022-01-01 RX ORDER — METOPROLOL TARTRATE 5 MG/5ML
2.5 INJECTION INTRAVENOUS ONCE
Status: COMPLETED | OUTPATIENT
Start: 2022-01-01 | End: 2022-01-01

## 2022-01-01 RX ORDER — SODIUM CHLORIDE 0.9 % (FLUSH) 0.9 %
3-10 SYRINGE (ML) INJECTION AS NEEDED
Status: DISCONTINUED | OUTPATIENT
Start: 2022-01-01 | End: 2022-01-01 | Stop reason: HOSPADM

## 2022-01-01 RX ORDER — PROMETHAZINE HYDROCHLORIDE 25 MG/1
25 TABLET ORAL ONCE AS NEEDED
Status: DISCONTINUED | OUTPATIENT
Start: 2022-01-01 | End: 2022-01-01 | Stop reason: HOSPADM

## 2022-01-01 RX ORDER — POTASSIUM CHLORIDE 750 MG/1
40 CAPSULE, EXTENDED RELEASE ORAL ONCE
Status: COMPLETED | OUTPATIENT
Start: 2022-01-01 | End: 2022-01-01

## 2022-01-01 RX ORDER — FENTANYL CITRATE 50 UG/ML
25 INJECTION, SOLUTION INTRAMUSCULAR; INTRAVENOUS
Status: DISCONTINUED | OUTPATIENT
Start: 2022-01-01 | End: 2022-01-01 | Stop reason: HOSPADM

## 2022-01-01 RX ORDER — IPRATROPIUM BROMIDE AND ALBUTEROL SULFATE 2.5; .5 MG/3ML; MG/3ML
3 SOLUTION RESPIRATORY (INHALATION) ONCE AS NEEDED
Status: DISCONTINUED | OUTPATIENT
Start: 2022-01-01 | End: 2022-01-01 | Stop reason: HOSPADM

## 2022-01-01 RX ORDER — METHYLPREDNISOLONE SODIUM SUCCINATE 40 MG/ML
40 INJECTION, POWDER, LYOPHILIZED, FOR SOLUTION INTRAMUSCULAR; INTRAVENOUS EVERY 8 HOURS
Status: DISCONTINUED | OUTPATIENT
Start: 2022-01-01 | End: 2022-01-01

## 2022-01-01 RX ORDER — FAMOTIDINE 20 MG/1
20 TABLET, FILM COATED ORAL ONCE
Status: DISCONTINUED | OUTPATIENT
Start: 2022-01-01 | End: 2022-01-01 | Stop reason: HOSPADM

## 2022-01-01 RX ORDER — BISACODYL 5 MG/1
5 TABLET, DELAYED RELEASE ORAL DAILY PRN
Status: DISCONTINUED | OUTPATIENT
Start: 2022-01-01 | End: 2022-01-01 | Stop reason: HOSPADM

## 2022-01-01 RX ORDER — DIAZEPAM 5 MG/1
5 TABLET ORAL EVERY 8 HOURS PRN
Status: DISCONTINUED | OUTPATIENT
Start: 2022-01-01 | End: 2022-01-01

## 2022-01-01 RX ORDER — LABETALOL HYDROCHLORIDE 5 MG/ML
5 INJECTION, SOLUTION INTRAVENOUS
Status: DISCONTINUED | OUTPATIENT
Start: 2022-01-01 | End: 2022-01-01 | Stop reason: HOSPADM

## 2022-01-01 RX ORDER — FAMOTIDINE 10 MG/ML
20 INJECTION, SOLUTION INTRAVENOUS ONCE
Status: DISCONTINUED | OUTPATIENT
Start: 2022-01-01 | End: 2022-01-01 | Stop reason: HOSPADM

## 2022-01-01 RX ORDER — DOXYCYCLINE 100 MG/1
100 CAPSULE ORAL EVERY 12 HOURS SCHEDULED
Status: COMPLETED | OUTPATIENT
Start: 2022-01-01 | End: 2022-01-01

## 2022-01-01 RX ORDER — HYDROMORPHONE HYDROCHLORIDE 1 MG/ML
0.25 INJECTION, SOLUTION INTRAMUSCULAR; INTRAVENOUS; SUBCUTANEOUS ONCE
Status: COMPLETED | OUTPATIENT
Start: 2022-01-01 | End: 2022-01-01

## 2022-01-01 RX ORDER — IPRATROPIUM BROMIDE AND ALBUTEROL SULFATE 2.5; .5 MG/3ML; MG/3ML
3 SOLUTION RESPIRATORY (INHALATION)
Qty: 360 ML
Start: 2022-01-01

## 2022-01-01 RX ORDER — POTASSIUM CHLORIDE 7.45 MG/ML
10 INJECTION INTRAVENOUS
Status: DISCONTINUED | OUTPATIENT
Start: 2022-01-01 | End: 2022-01-01 | Stop reason: SDUPTHER

## 2022-01-01 RX ORDER — FAMOTIDINE 10 MG/ML
20 INJECTION, SOLUTION INTRAVENOUS ONCE
Status: COMPLETED | OUTPATIENT
Start: 2022-01-01 | End: 2022-01-01

## 2022-01-01 RX ORDER — ROPIVACAINE HYDROCHLORIDE 5 MG/ML
INJECTION, SOLUTION EPIDURAL; INFILTRATION; PERINEURAL
Status: DISCONTINUED | OUTPATIENT
Start: 2022-01-01 | End: 2022-01-01

## 2022-01-01 RX ORDER — AMOXICILLIN 250 MG
2 CAPSULE ORAL 2 TIMES DAILY PRN
Status: DISCONTINUED | OUTPATIENT
Start: 2022-01-01 | End: 2022-01-01 | Stop reason: HOSPADM

## 2022-01-01 RX ORDER — ONDANSETRON 2 MG/ML
4 INJECTION INTRAMUSCULAR; INTRAVENOUS ONCE
Status: COMPLETED | OUTPATIENT
Start: 2022-01-01 | End: 2022-01-01

## 2022-01-01 RX ORDER — AMLODIPINE BESYLATE 10 MG/1
10 TABLET ORAL DAILY
COMMUNITY
End: 2022-01-01 | Stop reason: HOSPADM

## 2022-01-01 RX ORDER — HEPARIN SODIUM 5000 [USP'U]/ML
5000 INJECTION, SOLUTION INTRAVENOUS; SUBCUTANEOUS EVERY 8 HOURS SCHEDULED
Status: DISCONTINUED | OUTPATIENT
Start: 2022-01-01 | End: 2022-01-01 | Stop reason: HOSPADM

## 2022-01-01 RX ORDER — OXYCODONE HYDROCHLORIDE 5 MG/1
5 TABLET ORAL EVERY 4 HOURS PRN
Status: DISCONTINUED | OUTPATIENT
Start: 2022-01-01 | End: 2022-01-01 | Stop reason: HOSPADM

## 2022-01-01 RX ORDER — ACETAMINOPHEN 325 MG/1
650 TABLET ORAL EVERY 4 HOURS PRN
Status: DISCONTINUED | OUTPATIENT
Start: 2022-01-01 | End: 2022-01-01 | Stop reason: SDUPTHER

## 2022-01-01 RX ORDER — CEFAZOLIN SODIUM 2 G/100ML
2 INJECTION, SOLUTION INTRAVENOUS
Status: DISCONTINUED | OUTPATIENT
Start: 2022-01-01 | End: 2022-01-01

## 2022-01-01 RX ORDER — HYDRALAZINE HYDROCHLORIDE 20 MG/ML
5 INJECTION INTRAMUSCULAR; INTRAVENOUS
Status: DISCONTINUED | OUTPATIENT
Start: 2022-01-01 | End: 2022-01-01 | Stop reason: HOSPADM

## 2022-01-01 RX ORDER — METHYLPREDNISOLONE SODIUM SUCCINATE 125 MG/2ML
60 INJECTION, POWDER, LYOPHILIZED, FOR SOLUTION INTRAMUSCULAR; INTRAVENOUS EVERY 24 HOURS
Status: DISCONTINUED | OUTPATIENT
Start: 2022-01-01 | End: 2022-01-01

## 2022-01-01 RX ORDER — GUAIFENESIN 600 MG/1
1200 TABLET, EXTENDED RELEASE ORAL EVERY 12 HOURS SCHEDULED
Status: DISCONTINUED | OUTPATIENT
Start: 2022-01-01 | End: 2022-01-01

## 2022-01-01 RX ORDER — LORAZEPAM 1 MG/1
1 TABLET ORAL EVERY 8 HOURS PRN
Status: DISCONTINUED | OUTPATIENT
Start: 2022-01-01 | End: 2022-01-01 | Stop reason: HOSPADM

## 2022-01-01 RX ORDER — BISACODYL 10 MG
10 SUPPOSITORY, RECTAL RECTAL DAILY PRN
Status: DISCONTINUED | OUTPATIENT
Start: 2022-01-01 | End: 2022-01-01 | Stop reason: HOSPADM

## 2022-01-01 RX ORDER — LEVETIRACETAM 5 MG/ML
500 INJECTION INTRAVASCULAR EVERY 12 HOURS SCHEDULED
Status: DISCONTINUED | OUTPATIENT
Start: 2022-01-01 | End: 2022-01-01

## 2022-01-01 RX ORDER — METHYLPREDNISOLONE SODIUM SUCCINATE 40 MG/ML
40 INJECTION, POWDER, LYOPHILIZED, FOR SOLUTION INTRAMUSCULAR; INTRAVENOUS EVERY 8 HOURS SCHEDULED
Status: DISCONTINUED | OUTPATIENT
Start: 2022-01-01 | End: 2022-01-01

## 2022-01-01 RX ORDER — DOXYCYCLINE 100 MG/1
100 CAPSULE ORAL EVERY 12 HOURS SCHEDULED
Status: DISCONTINUED | OUTPATIENT
Start: 2022-01-01 | End: 2022-01-01

## 2022-01-01 RX ADMIN — LORAZEPAM 1 MG: 1 TABLET ORAL at 09:36

## 2022-01-01 RX ADMIN — BUSPIRONE HYDROCHLORIDE 10 MG: 10 TABLET ORAL at 17:04

## 2022-01-01 RX ADMIN — BUSPIRONE HYDROCHLORIDE 10 MG: 10 TABLET ORAL at 20:19

## 2022-01-01 RX ADMIN — CEFEPIME 2 G: 2 INJECTION, POWDER, FOR SOLUTION INTRAVENOUS at 02:25

## 2022-01-01 RX ADMIN — IPRATROPIUM BROMIDE AND ALBUTEROL SULFATE 3 ML: .5; 3 SOLUTION RESPIRATORY (INHALATION) at 09:32

## 2022-01-01 RX ADMIN — BUSPIRONE HYDROCHLORIDE 10 MG: 10 TABLET ORAL at 21:16

## 2022-01-01 RX ADMIN — DOXYCYCLINE 100 MG: 100 CAPSULE ORAL at 08:58

## 2022-01-01 RX ADMIN — BUSPIRONE HYDROCHLORIDE 10 MG: 10 TABLET ORAL at 16:29

## 2022-01-01 RX ADMIN — Medication 10 ML: at 08:02

## 2022-01-01 RX ADMIN — PAROXETINE HYDROCHLORIDE 20 MG: 20 TABLET, FILM COATED ORAL at 08:41

## 2022-01-01 RX ADMIN — FAMOTIDINE 10 MG: 20 TABLET ORAL at 06:50

## 2022-01-01 RX ADMIN — METHYLPREDNISOLONE SODIUM SUCCINATE 60 MG: 125 INJECTION, POWDER, FOR SOLUTION INTRAMUSCULAR; INTRAVENOUS at 13:13

## 2022-01-01 RX ADMIN — AMLODIPINE BESYLATE 5 MG: 5 TABLET ORAL at 09:03

## 2022-01-01 RX ADMIN — Medication 10 ML: at 20:17

## 2022-01-01 RX ADMIN — SENNOSIDES AND DOCUSATE SODIUM 2 TABLET: 50; 8.6 TABLET ORAL at 10:09

## 2022-01-01 RX ADMIN — IPRATROPIUM BROMIDE AND ALBUTEROL SULFATE 3 ML: 2.5; .5 SOLUTION RESPIRATORY (INHALATION) at 08:29

## 2022-01-01 RX ADMIN — ACETAMINOPHEN 650 MG: 325 TABLET ORAL at 12:18

## 2022-01-01 RX ADMIN — ACETAMINOPHEN 325MG 650 MG: 325 TABLET ORAL at 20:02

## 2022-01-01 RX ADMIN — DOXYCYCLINE 100 MG: 100 INJECTION, POWDER, LYOPHILIZED, FOR SOLUTION INTRAVENOUS at 05:56

## 2022-01-01 RX ADMIN — FUROSEMIDE 40 MG: 10 INJECTION, SOLUTION INTRAMUSCULAR; INTRAVENOUS at 07:51

## 2022-01-01 RX ADMIN — IPRATROPIUM BROMIDE AND ALBUTEROL SULFATE 3 ML: .5; 3 SOLUTION RESPIRATORY (INHALATION) at 19:16

## 2022-01-01 RX ADMIN — AMLODIPINE BESYLATE 5 MG: 5 TABLET ORAL at 08:29

## 2022-01-01 RX ADMIN — Medication 10 ML: at 07:47

## 2022-01-01 RX ADMIN — BUDESONIDE AND FORMOTEROL FUMARATE DIHYDRATE 2 PUFF: 80; 4.5 AEROSOL RESPIRATORY (INHALATION) at 19:35

## 2022-01-01 RX ADMIN — IPRATROPIUM BROMIDE AND ALBUTEROL SULFATE 3 ML: .5; 3 SOLUTION RESPIRATORY (INHALATION) at 15:27

## 2022-01-01 RX ADMIN — HEPARIN SODIUM 5000 UNITS: 5000 INJECTION, SOLUTION INTRAVENOUS; SUBCUTANEOUS at 21:46

## 2022-01-01 RX ADMIN — IPRATROPIUM BROMIDE AND ALBUTEROL SULFATE 3 ML: 2.5; .5 SOLUTION RESPIRATORY (INHALATION) at 03:28

## 2022-01-01 RX ADMIN — DOXYCYCLINE 100 MG: 100 CAPSULE ORAL at 20:35

## 2022-01-01 RX ADMIN — POTASSIUM CHLORIDE 40 MEQ: 750 CAPSULE, EXTENDED RELEASE ORAL at 02:25

## 2022-01-01 RX ADMIN — LEVETIRACETAM 500 MG: 500 TABLET, FILM COATED ORAL at 08:37

## 2022-01-01 RX ADMIN — FAMOTIDINE 10 MG: 20 TABLET ORAL at 16:20

## 2022-01-01 RX ADMIN — PAROXETINE HYDROCHLORIDE 20 MG: 20 TABLET, FILM COATED ORAL at 07:46

## 2022-01-01 RX ADMIN — HYDROCODONE BITARTRATE AND ACETAMINOPHEN 1 TABLET: 5; 325 TABLET ORAL at 20:16

## 2022-01-01 RX ADMIN — DOXYCYCLINE 100 MG: 100 INJECTION, POWDER, LYOPHILIZED, FOR SOLUTION INTRAVENOUS at 12:21

## 2022-01-01 RX ADMIN — CEFEPIME 2 G: 2 INJECTION, POWDER, FOR SOLUTION INTRAVENOUS at 21:21

## 2022-01-01 RX ADMIN — METOPROLOL SUCCINATE 25 MG: 25 TABLET, EXTENDED RELEASE ORAL at 16:29

## 2022-01-01 RX ADMIN — DOXYCYCLINE 100 MG: 100 CAPSULE ORAL at 08:37

## 2022-01-01 RX ADMIN — LEVETIRACETAM 500 MG: 500 TABLET, FILM COATED ORAL at 20:35

## 2022-01-01 RX ADMIN — IPRATROPIUM BROMIDE AND ALBUTEROL SULFATE 3 ML: 2.5; .5 SOLUTION RESPIRATORY (INHALATION) at 12:30

## 2022-01-01 RX ADMIN — HEPARIN SODIUM 5000 UNITS: 5000 INJECTION, SOLUTION INTRAVENOUS; SUBCUTANEOUS at 06:35

## 2022-01-01 RX ADMIN — BUSPIRONE HYDROCHLORIDE 10 MG: 10 TABLET ORAL at 20:15

## 2022-01-01 RX ADMIN — BUSPIRONE HYDROCHLORIDE 10 MG: 10 TABLET ORAL at 21:43

## 2022-01-01 RX ADMIN — LORAZEPAM 1 MG: 1 TABLET ORAL at 19:57

## 2022-01-01 RX ADMIN — DOXYCYCLINE 100 MG: 100 CAPSULE ORAL at 21:00

## 2022-01-01 RX ADMIN — BUSPIRONE HYDROCHLORIDE 10 MG: 10 TABLET ORAL at 09:11

## 2022-01-01 RX ADMIN — POTASSIUM CHLORIDE 40 MEQ: 750 CAPSULE, EXTENDED RELEASE ORAL at 15:27

## 2022-01-01 RX ADMIN — PAROXETINE HYDROCHLORIDE 20 MG: 20 TABLET, FILM COATED ORAL at 07:56

## 2022-01-01 RX ADMIN — METHYLPREDNISOLONE SODIUM SUCCINATE 80 MG: 125 INJECTION, POWDER, FOR SOLUTION INTRAMUSCULAR; INTRAVENOUS at 00:17

## 2022-01-01 RX ADMIN — Medication 1 CAPSULE: at 09:29

## 2022-01-01 RX ADMIN — IPRATROPIUM BROMIDE AND ALBUTEROL SULFATE 3 ML: .5; 3 SOLUTION RESPIRATORY (INHALATION) at 06:44

## 2022-01-01 RX ADMIN — BUSPIRONE HYDROCHLORIDE 10 MG: 10 TABLET ORAL at 09:31

## 2022-01-01 RX ADMIN — SODIUM CHLORIDE 1 G: 900 INJECTION INTRAVENOUS at 16:48

## 2022-01-01 RX ADMIN — LEVETIRACETAM 500 MG: 500 TABLET, FILM COATED ORAL at 08:57

## 2022-01-01 RX ADMIN — Medication 10 ML: at 08:44

## 2022-01-01 RX ADMIN — LEVETIRACETAM 500 MG: 5 INJECTION INTRAVENOUS at 20:02

## 2022-01-01 RX ADMIN — SODIUM CHLORIDE 2 G: 900 INJECTION INTRAVENOUS at 13:12

## 2022-01-01 RX ADMIN — FAMOTIDINE 10 MG: 20 TABLET, FILM COATED ORAL at 08:13

## 2022-01-01 RX ADMIN — PAROXETINE HYDROCHLORIDE 20 MG: 20 TABLET, FILM COATED ORAL at 08:02

## 2022-01-01 RX ADMIN — METHYLPREDNISOLONE SODIUM SUCCINATE 60 MG: 125 INJECTION, POWDER, FOR SOLUTION INTRAMUSCULAR; INTRAVENOUS at 17:00

## 2022-01-01 RX ADMIN — IPRATROPIUM BROMIDE AND ALBUTEROL SULFATE 3 ML: .5; 3 SOLUTION RESPIRATORY (INHALATION) at 03:24

## 2022-01-01 RX ADMIN — METHYLPREDNISOLONE SODIUM SUCCINATE 60 MG: 125 INJECTION, POWDER, FOR SOLUTION INTRAMUSCULAR; INTRAVENOUS at 06:01

## 2022-01-01 RX ADMIN — LEVETIRACETAM 500 MG: 500 TABLET, FILM COATED ORAL at 09:32

## 2022-01-01 RX ADMIN — IPRATROPIUM BROMIDE AND ALBUTEROL SULFATE 3 ML: .5; 3 SOLUTION RESPIRATORY (INHALATION) at 20:18

## 2022-01-01 RX ADMIN — FAMOTIDINE 10 MG: 20 TABLET, FILM COATED ORAL at 07:46

## 2022-01-01 RX ADMIN — Medication 10 ML: at 09:13

## 2022-01-01 RX ADMIN — BUDESONIDE AND FORMOTEROL FUMARATE DIHYDRATE 2 PUFF: 80; 4.5 AEROSOL RESPIRATORY (INHALATION) at 08:31

## 2022-01-01 RX ADMIN — DIAZEPAM 5 MG: 5 TABLET ORAL at 09:32

## 2022-01-01 RX ADMIN — CEFEPIME 2 G: 2 INJECTION, POWDER, FOR SOLUTION INTRAVENOUS at 03:07

## 2022-01-01 RX ADMIN — PAROXETINE HYDROCHLORIDE 20 MG: 20 TABLET, FILM COATED ORAL at 09:29

## 2022-01-01 RX ADMIN — IPRATROPIUM BROMIDE AND ALBUTEROL SULFATE 3 ML: .5; 3 SOLUTION RESPIRATORY (INHALATION) at 19:49

## 2022-01-01 RX ADMIN — FAMOTIDINE 10 MG: 20 TABLET ORAL at 16:39

## 2022-01-01 RX ADMIN — IPRATROPIUM BROMIDE AND ALBUTEROL SULFATE 3 ML: .5; 3 SOLUTION RESPIRATORY (INHALATION) at 11:50

## 2022-01-01 RX ADMIN — BUSPIRONE HYDROCHLORIDE 10 MG: 10 TABLET ORAL at 08:51

## 2022-01-01 RX ADMIN — IPRATROPIUM BROMIDE AND ALBUTEROL SULFATE 3 ML: .5; 3 SOLUTION RESPIRATORY (INHALATION) at 23:34

## 2022-01-01 RX ADMIN — FAMOTIDINE 10 MG: 20 TABLET, FILM COATED ORAL at 17:40

## 2022-01-01 RX ADMIN — LEVETIRACETAM 500 MG: 500 TABLET, FILM COATED ORAL at 21:46

## 2022-01-01 RX ADMIN — LORAZEPAM 1 MG: 1 TABLET ORAL at 09:10

## 2022-01-01 RX ADMIN — Medication 10 ML: at 07:56

## 2022-01-01 RX ADMIN — IPRATROPIUM BROMIDE AND ALBUTEROL SULFATE 3 ML: 2.5; .5 SOLUTION RESPIRATORY (INHALATION) at 08:40

## 2022-01-01 RX ADMIN — HEPARIN SODIUM 5000 UNITS: 5000 INJECTION, SOLUTION INTRAVENOUS; SUBCUTANEOUS at 13:23

## 2022-01-01 RX ADMIN — FAMOTIDINE 10 MG: 20 TABLET ORAL at 06:26

## 2022-01-01 RX ADMIN — PREDNISONE 40 MG: 20 TABLET ORAL at 08:02

## 2022-01-01 RX ADMIN — Medication 1 CAPSULE: at 08:13

## 2022-01-01 RX ADMIN — LEVETIRACETAM 500 MG: 500 TABLET, FILM COATED ORAL at 07:57

## 2022-01-01 RX ADMIN — IPRATROPIUM BROMIDE AND ALBUTEROL SULFATE 3 ML: .5; 3 SOLUTION RESPIRATORY (INHALATION) at 20:51

## 2022-01-01 RX ADMIN — IPRATROPIUM BROMIDE AND ALBUTEROL SULFATE 3 ML: .5; 3 SOLUTION RESPIRATORY (INHALATION) at 23:19

## 2022-01-01 RX ADMIN — BUSPIRONE HYDROCHLORIDE 10 MG: 10 TABLET ORAL at 16:39

## 2022-01-01 RX ADMIN — LEVETIRACETAM 500 MG: 5 INJECTION INTRAVENOUS at 10:09

## 2022-01-01 RX ADMIN — PREDNISONE 40 MG: 20 TABLET ORAL at 09:31

## 2022-01-01 RX ADMIN — CEFAZOLIN SODIUM 2 G: 2 INJECTION, SOLUTION INTRAVENOUS at 15:10

## 2022-01-01 RX ADMIN — HEPARIN SODIUM 5000 UNITS: 5000 INJECTION, SOLUTION INTRAVENOUS; SUBCUTANEOUS at 13:13

## 2022-01-01 RX ADMIN — MAGNESIUM SULFATE 4 G: 4 INJECTION INTRAVENOUS at 09:54

## 2022-01-01 RX ADMIN — IPRATROPIUM BROMIDE AND ALBUTEROL SULFATE 3 ML: 2.5; .5 SOLUTION RESPIRATORY (INHALATION) at 20:30

## 2022-01-01 RX ADMIN — ACETAMINOPHEN 650 MG: 325 TABLET ORAL at 09:32

## 2022-01-01 RX ADMIN — BUDESONIDE AND FORMOTEROL FUMARATE DIHYDRATE 2 PUFF: 80; 4.5 AEROSOL RESPIRATORY (INHALATION) at 19:14

## 2022-01-01 RX ADMIN — AMLODIPINE BESYLATE 5 MG: 5 TABLET ORAL at 09:11

## 2022-01-01 RX ADMIN — Medication 10 ML: at 08:07

## 2022-01-01 RX ADMIN — LEVETIRACETAM 500 MG: 500 TABLET, FILM COATED ORAL at 08:40

## 2022-01-01 RX ADMIN — SODIUM CHLORIDE 100 ML/HR: 9 INJECTION, SOLUTION INTRAVENOUS at 21:42

## 2022-01-01 RX ADMIN — BUSPIRONE HYDROCHLORIDE 10 MG: 10 TABLET ORAL at 15:04

## 2022-01-01 RX ADMIN — FAMOTIDINE 10 MG: 20 TABLET, FILM COATED ORAL at 08:06

## 2022-01-01 RX ADMIN — IPRATROPIUM BROMIDE AND ALBUTEROL SULFATE 3 ML: .5; 3 SOLUTION RESPIRATORY (INHALATION) at 16:38

## 2022-01-01 RX ADMIN — HEPARIN SODIUM 5000 UNITS: 5000 INJECTION, SOLUTION INTRAVENOUS; SUBCUTANEOUS at 14:12

## 2022-01-01 RX ADMIN — HEPARIN SODIUM 5000 UNITS: 5000 INJECTION, SOLUTION INTRAVENOUS; SUBCUTANEOUS at 06:01

## 2022-01-01 RX ADMIN — IPRATROPIUM BROMIDE AND ALBUTEROL SULFATE 3 ML: 2.5; .5 SOLUTION RESPIRATORY (INHALATION) at 08:22

## 2022-01-01 RX ADMIN — IPRATROPIUM BROMIDE AND ALBUTEROL SULFATE 3 ML: 2.5; .5 SOLUTION RESPIRATORY (INHALATION) at 19:14

## 2022-01-01 RX ADMIN — SENNOSIDES AND DOCUSATE SODIUM 2 TABLET: 50; 8.6 TABLET ORAL at 17:43

## 2022-01-01 RX ADMIN — FENTANYL CITRATE 50 MCG: 50 INJECTION, SOLUTION INTRAMUSCULAR; INTRAVENOUS at 16:33

## 2022-01-01 RX ADMIN — PROPOFOL 100 MG: 10 INJECTION, EMULSION INTRAVENOUS at 15:01

## 2022-01-01 RX ADMIN — AMLODIPINE BESYLATE 5 MG: 5 TABLET ORAL at 08:01

## 2022-01-01 RX ADMIN — DIAZEPAM 5 MG: 5 TABLET ORAL at 20:35

## 2022-01-01 RX ADMIN — Medication 10 ML: at 20:21

## 2022-01-01 RX ADMIN — FENTANYL CITRATE 50 MCG: 50 INJECTION, SOLUTION INTRAMUSCULAR; INTRAVENOUS at 15:46

## 2022-01-01 RX ADMIN — DOXYCYCLINE 100 MG: 100 CAPSULE ORAL at 20:17

## 2022-01-01 RX ADMIN — IPRATROPIUM BROMIDE AND ALBUTEROL SULFATE 3 ML: 2.5; .5 SOLUTION RESPIRATORY (INHALATION) at 15:17

## 2022-01-01 RX ADMIN — IPRATROPIUM BROMIDE AND ALBUTEROL SULFATE 3 ML: 2.5; .5 SOLUTION RESPIRATORY (INHALATION) at 07:05

## 2022-01-01 RX ADMIN — HEPARIN SODIUM 5000 UNITS: 5000 INJECTION, SOLUTION INTRAVENOUS; SUBCUTANEOUS at 13:26

## 2022-01-01 RX ADMIN — PAROXETINE HYDROCHLORIDE 20 MG: 20 TABLET, FILM COATED ORAL at 08:46

## 2022-01-01 RX ADMIN — IPRATROPIUM BROMIDE AND ALBUTEROL SULFATE 3 ML: 2.5; .5 SOLUTION RESPIRATORY (INHALATION) at 12:17

## 2022-01-01 RX ADMIN — LEVETIRACETAM 500 MG: 500 TABLET, FILM COATED ORAL at 21:26

## 2022-01-01 RX ADMIN — DOXYCYCLINE 100 MG: 100 CAPSULE ORAL at 08:46

## 2022-01-01 RX ADMIN — IPRATROPIUM BROMIDE AND ALBUTEROL SULFATE 3 ML: 2.5; .5 SOLUTION RESPIRATORY (INHALATION) at 19:53

## 2022-01-01 RX ADMIN — Medication 10 ML: at 21:06

## 2022-01-01 RX ADMIN — ENOXAPARIN SODIUM 40 MG: 40 INJECTION SUBCUTANEOUS at 19:54

## 2022-01-01 RX ADMIN — ACETAMINOPHEN 325MG 650 MG: 325 TABLET ORAL at 15:41

## 2022-01-01 RX ADMIN — BUSPIRONE HYDROCHLORIDE 10 MG: 10 TABLET ORAL at 16:03

## 2022-01-01 RX ADMIN — POTASSIUM CHLORIDE 40 MEQ: 750 CAPSULE, EXTENDED RELEASE ORAL at 19:57

## 2022-01-01 RX ADMIN — POTASSIUM CHLORIDE 40 MEQ: 750 CAPSULE, EXTENDED RELEASE ORAL at 06:57

## 2022-01-01 RX ADMIN — Medication 1 CAPSULE: at 08:01

## 2022-01-01 RX ADMIN — IPRATROPIUM BROMIDE AND ALBUTEROL SULFATE 3 ML: 2.5; .5 SOLUTION RESPIRATORY (INHALATION) at 06:50

## 2022-01-01 RX ADMIN — FAMOTIDINE 10 MG: 20 TABLET, FILM COATED ORAL at 17:26

## 2022-01-01 RX ADMIN — FAMOTIDINE 10 MG: 20 TABLET ORAL at 17:09

## 2022-01-01 RX ADMIN — BUSPIRONE HYDROCHLORIDE 10 MG: 10 TABLET ORAL at 09:29

## 2022-01-01 RX ADMIN — POTASSIUM CHLORIDE 10 MEQ: 7.46 INJECTION, SOLUTION INTRAVENOUS at 21:46

## 2022-01-01 RX ADMIN — LEVETIRACETAM 500 MG: 500 TABLET, FILM COATED ORAL at 20:45

## 2022-01-01 RX ADMIN — DOXYCYCLINE 100 MG: 100 CAPSULE ORAL at 21:25

## 2022-01-01 RX ADMIN — Medication 1 CAPSULE: at 09:10

## 2022-01-01 RX ADMIN — METHYLPREDNISOLONE SODIUM SUCCINATE 60 MG: 125 INJECTION, POWDER, FOR SOLUTION INTRAMUSCULAR; INTRAVENOUS at 14:12

## 2022-01-01 RX ADMIN — BUSPIRONE HYDROCHLORIDE 10 MG: 10 TABLET ORAL at 20:00

## 2022-01-01 RX ADMIN — PAROXETINE HYDROCHLORIDE 20 MG: 20 TABLET, FILM COATED ORAL at 10:09

## 2022-01-01 RX ADMIN — BUSPIRONE HYDROCHLORIDE 10 MG: 10 TABLET ORAL at 15:01

## 2022-01-01 RX ADMIN — FAMOTIDINE 10 MG: 20 TABLET ORAL at 17:10

## 2022-01-01 RX ADMIN — BUSPIRONE HYDROCHLORIDE 10 MG: 10 TABLET ORAL at 15:41

## 2022-01-01 RX ADMIN — IPRATROPIUM BROMIDE AND ALBUTEROL SULFATE 3 ML: .5; 3 SOLUTION RESPIRATORY (INHALATION) at 12:18

## 2022-01-01 RX ADMIN — Medication 10 ML: at 08:41

## 2022-01-01 RX ADMIN — FAMOTIDINE 10 MG: 20 TABLET ORAL at 06:03

## 2022-01-01 RX ADMIN — MORPHINE SULFATE 1 MG: 2 INJECTION, SOLUTION INTRAMUSCULAR; INTRAVENOUS at 22:51

## 2022-01-01 RX ADMIN — SENNOSIDES AND DOCUSATE SODIUM 2 TABLET: 50; 8.6 TABLET ORAL at 19:58

## 2022-01-01 RX ADMIN — DOXYCYCLINE 100 MG: 100 INJECTION, POWDER, LYOPHILIZED, FOR SOLUTION INTRAVENOUS at 23:30

## 2022-01-01 RX ADMIN — PREDNISONE 30 MG: 20 TABLET ORAL at 08:13

## 2022-01-01 RX ADMIN — LORAZEPAM 0.5 MG: 0.5 TABLET ORAL at 23:30

## 2022-01-01 RX ADMIN — LEVETIRACETAM 500 MG: 500 TABLET, FILM COATED ORAL at 21:31

## 2022-01-01 RX ADMIN — CEFEPIME 2 G: 2 INJECTION, POWDER, FOR SOLUTION INTRAVENOUS at 03:04

## 2022-01-01 RX ADMIN — IPRATROPIUM BROMIDE AND ALBUTEROL SULFATE 3 ML: .5; 3 SOLUTION RESPIRATORY (INHALATION) at 08:05

## 2022-01-01 RX ADMIN — Medication 10 ML: at 08:13

## 2022-01-01 RX ADMIN — BUSPIRONE HYDROCHLORIDE 10 MG: 10 TABLET ORAL at 15:49

## 2022-01-01 RX ADMIN — Medication 10 ML: at 09:04

## 2022-01-01 RX ADMIN — PAROXETINE HYDROCHLORIDE 20 MG: 20 TABLET, FILM COATED ORAL at 08:58

## 2022-01-01 RX ADMIN — IPRATROPIUM BROMIDE AND ALBUTEROL SULFATE 3 ML: 2.5; .5 SOLUTION RESPIRATORY (INHALATION) at 16:06

## 2022-01-01 RX ADMIN — LEVETIRACETAM 500 MG: 500 TABLET, FILM COATED ORAL at 20:00

## 2022-01-01 RX ADMIN — IPRATROPIUM BROMIDE AND ALBUTEROL SULFATE 3 ML: 2.5; .5 SOLUTION RESPIRATORY (INHALATION) at 22:42

## 2022-01-01 RX ADMIN — IPRATROPIUM BROMIDE AND ALBUTEROL SULFATE 3 ML: .5; 3 SOLUTION RESPIRATORY (INHALATION) at 10:34

## 2022-01-01 RX ADMIN — FAMOTIDINE 10 MG: 20 TABLET, FILM COATED ORAL at 08:42

## 2022-01-01 RX ADMIN — FAMOTIDINE 10 MG: 20 TABLET ORAL at 08:02

## 2022-01-01 RX ADMIN — BUSPIRONE HYDROCHLORIDE 10 MG: 10 TABLET ORAL at 09:03

## 2022-01-01 RX ADMIN — IPRATROPIUM BROMIDE AND ALBUTEROL SULFATE 3 ML: .5; 3 SOLUTION RESPIRATORY (INHALATION) at 07:15

## 2022-01-01 RX ADMIN — Medication 10 ML: at 08:46

## 2022-01-01 RX ADMIN — HEPARIN SODIUM 5000 UNITS: 5000 INJECTION, SOLUTION INTRAVENOUS; SUBCUTANEOUS at 06:05

## 2022-01-01 RX ADMIN — SODIUM CHLORIDE 1 G: 900 INJECTION INTRAVENOUS at 17:53

## 2022-01-01 RX ADMIN — BUDESONIDE AND FORMOTEROL FUMARATE DIHYDRATE 2 PUFF: 80; 4.5 AEROSOL RESPIRATORY (INHALATION) at 19:53

## 2022-01-01 RX ADMIN — AMLODIPINE BESYLATE 5 MG: 5 TABLET ORAL at 08:37

## 2022-01-01 RX ADMIN — Medication 200 MCG: at 15:19

## 2022-01-01 RX ADMIN — FAMOTIDINE 10 MG: 20 TABLET ORAL at 08:45

## 2022-01-01 RX ADMIN — IPRATROPIUM BROMIDE AND ALBUTEROL SULFATE 3 ML: .5; 3 SOLUTION RESPIRATORY (INHALATION) at 16:02

## 2022-01-01 RX ADMIN — AMLODIPINE BESYLATE 5 MG: 5 TABLET ORAL at 09:29

## 2022-01-01 RX ADMIN — AMLODIPINE BESYLATE 5 MG: 5 TABLET ORAL at 08:58

## 2022-01-01 RX ADMIN — DOXYCYCLINE 100 MG: 100 CAPSULE ORAL at 20:19

## 2022-01-01 RX ADMIN — PREDNISONE 40 MG: 20 TABLET ORAL at 08:46

## 2022-01-01 RX ADMIN — FAMOTIDINE 10 MG: 20 TABLET, FILM COATED ORAL at 17:53

## 2022-01-01 RX ADMIN — IPRATROPIUM BROMIDE AND ALBUTEROL SULFATE 3 ML: 2.5; .5 SOLUTION RESPIRATORY (INHALATION) at 12:46

## 2022-01-01 RX ADMIN — IPRATROPIUM BROMIDE AND ALBUTEROL SULFATE 3 ML: .5; 3 SOLUTION RESPIRATORY (INHALATION) at 08:43

## 2022-01-01 RX ADMIN — MAGNESIUM SULFATE 1 G: 1 INJECTION INTRAVENOUS at 02:28

## 2022-01-01 RX ADMIN — FAMOTIDINE 10 MG: 20 TABLET ORAL at 08:37

## 2022-01-01 RX ADMIN — TRIAMTERENE AND HYDROCHLOROTHIAZIDE 1 TABLET: 37.5; 25 TABLET ORAL at 12:35

## 2022-01-01 RX ADMIN — Medication 10 ML: at 20:36

## 2022-01-01 RX ADMIN — Medication 10 ML: at 21:26

## 2022-01-01 RX ADMIN — FUROSEMIDE 40 MG: 10 INJECTION, SOLUTION INTRAMUSCULAR; INTRAVENOUS at 15:46

## 2022-01-01 RX ADMIN — LEVETIRACETAM 500 MG: 500 TABLET, FILM COATED ORAL at 08:13

## 2022-01-01 RX ADMIN — IPRATROPIUM BROMIDE AND ALBUTEROL SULFATE 3 ML: 2.5; .5 SOLUTION RESPIRATORY (INHALATION) at 19:35

## 2022-01-01 RX ADMIN — Medication 10 ML: at 09:53

## 2022-01-01 RX ADMIN — DOXYCYCLINE 100 MG: 100 CAPSULE ORAL at 09:03

## 2022-01-01 RX ADMIN — IPRATROPIUM BROMIDE AND ALBUTEROL SULFATE 3 ML: 2.5; .5 SOLUTION RESPIRATORY (INHALATION) at 19:29

## 2022-01-01 RX ADMIN — SODIUM CHLORIDE 2 G: 900 INJECTION INTRAVENOUS at 13:26

## 2022-01-01 RX ADMIN — IPRATROPIUM BROMIDE AND ALBUTEROL SULFATE 3 ML: 2.5; .5 SOLUTION RESPIRATORY (INHALATION) at 09:35

## 2022-01-01 RX ADMIN — IPRATROPIUM BROMIDE AND ALBUTEROL SULFATE 3 ML: .5; 3 SOLUTION RESPIRATORY (INHALATION) at 16:37

## 2022-01-01 RX ADMIN — PROPOFOL 50 MG: 10 INJECTION, EMULSION INTRAVENOUS at 15:46

## 2022-01-01 RX ADMIN — LORAZEPAM 1 MG: 1 TABLET ORAL at 20:17

## 2022-01-01 RX ADMIN — Medication 10 ML: at 20:18

## 2022-01-01 RX ADMIN — DOXYCYCLINE 100 MG: 100 CAPSULE ORAL at 21:06

## 2022-01-01 RX ADMIN — HYDROCODONE BITARTRATE AND ACETAMINOPHEN 1 TABLET: 5; 325 TABLET ORAL at 15:06

## 2022-01-01 RX ADMIN — IPRATROPIUM BROMIDE AND ALBUTEROL SULFATE 3 ML: 2.5; .5 SOLUTION RESPIRATORY (INHALATION) at 12:12

## 2022-01-01 RX ADMIN — PAROXETINE HYDROCHLORIDE 20 MG: 20 TABLET, FILM COATED ORAL at 09:10

## 2022-01-01 RX ADMIN — Medication 1 CAPSULE: at 08:40

## 2022-01-01 RX ADMIN — DOXYCYCLINE 100 MG: 100 CAPSULE ORAL at 08:13

## 2022-01-01 RX ADMIN — IPRATROPIUM BROMIDE AND ALBUTEROL SULFATE 3 ML: .5; 3 SOLUTION RESPIRATORY (INHALATION) at 20:14

## 2022-01-01 RX ADMIN — IPRATROPIUM BROMIDE AND ALBUTEROL SULFATE 3 ML: .5; 3 SOLUTION RESPIRATORY (INHALATION) at 11:10

## 2022-01-01 RX ADMIN — IPRATROPIUM BROMIDE AND ALBUTEROL SULFATE 3 ML: .5; 3 SOLUTION RESPIRATORY (INHALATION) at 07:04

## 2022-01-01 RX ADMIN — LEVETIRACETAM 500 MG: 500 TABLET, FILM COATED ORAL at 20:17

## 2022-01-01 RX ADMIN — IOPAMIDOL 80 ML: 755 INJECTION, SOLUTION INTRAVENOUS at 22:42

## 2022-01-01 RX ADMIN — IPRATROPIUM BROMIDE AND ALBUTEROL SULFATE 3 ML: .5; 3 SOLUTION RESPIRATORY (INHALATION) at 07:09

## 2022-01-01 RX ADMIN — PAROXETINE HYDROCHLORIDE 20 MG: 20 TABLET, FILM COATED ORAL at 08:42

## 2022-01-01 RX ADMIN — DIAZEPAM 5 MG: 5 TABLET ORAL at 21:00

## 2022-01-01 RX ADMIN — IPRATROPIUM BROMIDE AND ALBUTEROL SULFATE 3 ML: .5; 3 SOLUTION RESPIRATORY (INHALATION) at 22:59

## 2022-01-01 RX ADMIN — Medication 1 CAPSULE: at 08:51

## 2022-01-01 RX ADMIN — POTASSIUM CHLORIDE 40 MEQ: 750 CAPSULE, EXTENDED RELEASE ORAL at 10:26

## 2022-01-01 RX ADMIN — IPRATROPIUM BROMIDE AND ALBUTEROL SULFATE 3 ML: 2.5; .5 SOLUTION RESPIRATORY (INHALATION) at 06:09

## 2022-01-01 RX ADMIN — POTASSIUM CHLORIDE 40 MEQ: 750 CAPSULE, EXTENDED RELEASE ORAL at 11:20

## 2022-01-01 RX ADMIN — BARIUM SULFATE 55 ML: 0.81 POWDER, FOR SUSPENSION ORAL at 15:29

## 2022-01-01 RX ADMIN — LEVETIRACETAM 500 MG: 500 TABLET, FILM COATED ORAL at 20:15

## 2022-01-01 RX ADMIN — DOXYCYCLINE 100 MG: 100 INJECTION, POWDER, LYOPHILIZED, FOR SOLUTION INTRAVENOUS at 23:34

## 2022-01-01 RX ADMIN — IPRATROPIUM BROMIDE AND ALBUTEROL SULFATE 3 ML: 2.5; .5 SOLUTION RESPIRATORY (INHALATION) at 15:23

## 2022-01-01 RX ADMIN — PAROXETINE HYDROCHLORIDE 20 MG: 20 TABLET, FILM COATED ORAL at 08:37

## 2022-01-01 RX ADMIN — FAMOTIDINE 10 MG: 20 TABLET ORAL at 16:17

## 2022-01-01 RX ADMIN — DOXYCYCLINE 100 MG: 100 CAPSULE ORAL at 08:02

## 2022-01-01 RX ADMIN — LEVETIRACETAM 500 MG: 500 TABLET, FILM COATED ORAL at 20:19

## 2022-01-01 RX ADMIN — IPRATROPIUM BROMIDE AND ALBUTEROL SULFATE 3 ML: .5; 3 SOLUTION RESPIRATORY (INHALATION) at 00:22

## 2022-01-01 RX ADMIN — LEVETIRACETAM 500 MG: 500 TABLET, FILM COATED ORAL at 08:01

## 2022-01-01 RX ADMIN — IPRATROPIUM BROMIDE AND ALBUTEROL SULFATE 3 ML: 2.5; .5 SOLUTION RESPIRATORY (INHALATION) at 19:02

## 2022-01-01 RX ADMIN — BUSPIRONE HYDROCHLORIDE 10 MG: 10 TABLET ORAL at 16:17

## 2022-01-01 RX ADMIN — LEVETIRACETAM 500 MG: 500 TABLET, FILM COATED ORAL at 20:47

## 2022-01-01 RX ADMIN — BUSPIRONE HYDROCHLORIDE 10 MG: 10 TABLET ORAL at 21:06

## 2022-01-01 RX ADMIN — IPRATROPIUM BROMIDE AND ALBUTEROL SULFATE 3 ML: .5; 3 SOLUTION RESPIRATORY (INHALATION) at 23:47

## 2022-01-01 RX ADMIN — IPRATROPIUM BROMIDE AND ALBUTEROL SULFATE 3 ML: .5; 3 SOLUTION RESPIRATORY (INHALATION) at 02:52

## 2022-01-01 RX ADMIN — FAMOTIDINE 10 MG: 20 TABLET ORAL at 18:38

## 2022-01-01 RX ADMIN — HEPARIN SODIUM 5000 UNITS: 5000 INJECTION, SOLUTION INTRAVENOUS; SUBCUTANEOUS at 06:09

## 2022-01-01 RX ADMIN — DOXYCYCLINE 100 MG: 100 INJECTION, POWDER, LYOPHILIZED, FOR SOLUTION INTRAVENOUS at 09:03

## 2022-01-01 RX ADMIN — BUSPIRONE HYDROCHLORIDE 10 MG: 10 TABLET ORAL at 21:00

## 2022-01-01 RX ADMIN — LEVETIRACETAM 500 MG: 500 TABLET, FILM COATED ORAL at 08:45

## 2022-01-01 RX ADMIN — ENOXAPARIN SODIUM 40 MG: 40 INJECTION SUBCUTANEOUS at 21:05

## 2022-01-01 RX ADMIN — Medication 10 ML: at 20:20

## 2022-01-01 RX ADMIN — IPRATROPIUM BROMIDE AND ALBUTEROL SULFATE 3 ML: .5; 3 SOLUTION RESPIRATORY (INHALATION) at 06:28

## 2022-01-01 RX ADMIN — BUDESONIDE AND FORMOTEROL FUMARATE DIHYDRATE 2 PUFF: 80; 4.5 AEROSOL RESPIRATORY (INHALATION) at 06:09

## 2022-01-01 RX ADMIN — FAMOTIDINE 10 MG: 20 TABLET ORAL at 15:49

## 2022-01-01 RX ADMIN — DIAZEPAM 5 MG: 5 TABLET ORAL at 18:39

## 2022-01-01 RX ADMIN — IPRATROPIUM BROMIDE AND ALBUTEROL SULFATE 3 ML: .5; 3 SOLUTION RESPIRATORY (INHALATION) at 03:13

## 2022-01-01 RX ADMIN — BUSPIRONE HYDROCHLORIDE 10 MG: 10 TABLET ORAL at 08:02

## 2022-01-01 RX ADMIN — DOXYCYCLINE 100 MG: 100 CAPSULE ORAL at 07:55

## 2022-01-01 RX ADMIN — DIAZEPAM 5 MG: 5 TABLET ORAL at 09:05

## 2022-01-01 RX ADMIN — IPRATROPIUM BROMIDE AND ALBUTEROL SULFATE 3 ML: .5; 3 SOLUTION RESPIRATORY (INHALATION) at 15:49

## 2022-01-01 RX ADMIN — DOXYCYCLINE 100 MG: 100 INJECTION, POWDER, LYOPHILIZED, FOR SOLUTION INTRAVENOUS at 17:51

## 2022-01-01 RX ADMIN — ENOXAPARIN SODIUM 40 MG: 40 INJECTION SUBCUTANEOUS at 20:17

## 2022-01-01 RX ADMIN — ENOXAPARIN SODIUM 40 MG: 40 INJECTION SUBCUTANEOUS at 20:19

## 2022-01-01 RX ADMIN — POTASSIUM CHLORIDE 10 MEQ: 7.46 INJECTION, SOLUTION INTRAVENOUS at 23:40

## 2022-01-01 RX ADMIN — Medication 1 CAPSULE: at 08:37

## 2022-01-01 RX ADMIN — IPRATROPIUM BROMIDE AND ALBUTEROL SULFATE 3 ML: .5; 3 SOLUTION RESPIRATORY (INHALATION) at 20:28

## 2022-01-01 RX ADMIN — LEVETIRACETAM 500 MG: 5 INJECTION INTRAVENOUS at 09:52

## 2022-01-01 RX ADMIN — SODIUM CHLORIDE 1 G: 900 INJECTION INTRAVENOUS at 17:27

## 2022-01-01 RX ADMIN — SODIUM CHLORIDE 2 G: 900 INJECTION INTRAVENOUS at 13:13

## 2022-01-01 RX ADMIN — BUSPIRONE HYDROCHLORIDE 10 MG: 10 TABLET ORAL at 08:57

## 2022-01-01 RX ADMIN — IPRATROPIUM BROMIDE AND ALBUTEROL SULFATE 3 ML: .5; 3 SOLUTION RESPIRATORY (INHALATION) at 23:25

## 2022-01-01 RX ADMIN — LEVETIRACETAM 500 MG: 500 TABLET, FILM COATED ORAL at 21:06

## 2022-01-01 RX ADMIN — FAMOTIDINE 10 MG: 20 TABLET, FILM COATED ORAL at 08:28

## 2022-01-01 RX ADMIN — IPRATROPIUM BROMIDE AND ALBUTEROL SULFATE 3 ML: .5; 3 SOLUTION RESPIRATORY (INHALATION) at 23:30

## 2022-01-01 RX ADMIN — HEPARIN SODIUM 5000 UNITS: 5000 INJECTION, SOLUTION INTRAVENOUS; SUBCUTANEOUS at 21:06

## 2022-01-01 RX ADMIN — Medication 10 ML: at 07:57

## 2022-01-01 RX ADMIN — POTASSIUM CHLORIDE 10 MEQ: 7.46 INJECTION, SOLUTION INTRAVENOUS at 04:27

## 2022-01-01 RX ADMIN — BUSPIRONE HYDROCHLORIDE 10 MG: 10 TABLET ORAL at 08:08

## 2022-01-01 RX ADMIN — PAROXETINE HYDROCHLORIDE 20 MG: 20 TABLET, FILM COATED ORAL at 08:07

## 2022-01-01 RX ADMIN — LEVETIRACETAM 500 MG: 500 TABLET, FILM COATED ORAL at 09:11

## 2022-01-01 RX ADMIN — FAMOTIDINE 10 MG: 20 TABLET ORAL at 16:28

## 2022-01-01 RX ADMIN — FAMOTIDINE 10 MG: 20 TABLET, FILM COATED ORAL at 16:40

## 2022-01-01 RX ADMIN — IPRATROPIUM BROMIDE AND ALBUTEROL SULFATE 3 ML: .5; 3 SOLUTION RESPIRATORY (INHALATION) at 14:46

## 2022-01-01 RX ADMIN — Medication 10 ML: at 19:55

## 2022-01-01 RX ADMIN — PAROXETINE HYDROCHLORIDE 20 MG: 20 TABLET, FILM COATED ORAL at 09:52

## 2022-01-01 RX ADMIN — IPRATROPIUM BROMIDE AND ALBUTEROL SULFATE 3 ML: .5; 3 SOLUTION RESPIRATORY (INHALATION) at 23:23

## 2022-01-01 RX ADMIN — DOXYCYCLINE 100 MG: 100 CAPSULE ORAL at 21:16

## 2022-01-01 RX ADMIN — IPRATROPIUM BROMIDE AND ALBUTEROL SULFATE 3 ML: .5; 3 SOLUTION RESPIRATORY (INHALATION) at 00:16

## 2022-01-01 RX ADMIN — DIAZEPAM 5 MG: 5 TABLET ORAL at 08:01

## 2022-01-01 RX ADMIN — FAMOTIDINE 10 MG: 20 TABLET ORAL at 08:40

## 2022-01-01 RX ADMIN — POTASSIUM CHLORIDE 40 MEQ: 750 CAPSULE, EXTENDED RELEASE ORAL at 16:34

## 2022-01-01 RX ADMIN — ACETAMINOPHEN 325MG 650 MG: 325 TABLET ORAL at 19:57

## 2022-01-01 RX ADMIN — IPRATROPIUM BROMIDE AND ALBUTEROL SULFATE 3 ML: 2.5; .5 SOLUTION RESPIRATORY (INHALATION) at 03:11

## 2022-01-01 RX ADMIN — CEFEPIME 2 G: 2 INJECTION, POWDER, FOR SOLUTION INTRAVENOUS at 10:47

## 2022-01-01 RX ADMIN — IPRATROPIUM BROMIDE AND ALBUTEROL SULFATE 3 ML: 2.5; .5 SOLUTION RESPIRATORY (INHALATION) at 15:50

## 2022-01-01 RX ADMIN — MORPHINE SULFATE 1 MG: 2 INJECTION, SOLUTION INTRAMUSCULAR; INTRAVENOUS at 05:15

## 2022-01-01 RX ADMIN — DOXYCYCLINE 100 MG: 100 INJECTION, POWDER, LYOPHILIZED, FOR SOLUTION INTRAVENOUS at 02:20

## 2022-01-01 RX ADMIN — IPRATROPIUM BROMIDE AND ALBUTEROL SULFATE 3 ML: 2.5; .5 SOLUTION RESPIRATORY (INHALATION) at 23:09

## 2022-01-01 RX ADMIN — FAMOTIDINE 10 MG: 20 TABLET ORAL at 17:35

## 2022-01-01 RX ADMIN — POTASSIUM CHLORIDE 10 MEQ: 7.46 INJECTION, SOLUTION INTRAVENOUS at 04:11

## 2022-01-01 RX ADMIN — LEVETIRACETAM 500 MG: 500 TABLET, FILM COATED ORAL at 07:46

## 2022-01-01 RX ADMIN — DOXYCYCLINE 100 MG: 100 CAPSULE ORAL at 08:40

## 2022-01-01 RX ADMIN — IPRATROPIUM BROMIDE AND ALBUTEROL SULFATE 3 ML: .5; 3 SOLUTION RESPIRATORY (INHALATION) at 03:14

## 2022-01-01 RX ADMIN — IPRATROPIUM BROMIDE AND ALBUTEROL SULFATE 3 ML: .5; 3 SOLUTION RESPIRATORY (INHALATION) at 15:53

## 2022-01-01 RX ADMIN — PREDNISONE 40 MG: 20 TABLET ORAL at 08:51

## 2022-01-01 RX ADMIN — IPRATROPIUM BROMIDE AND ALBUTEROL SULFATE 3 ML: .5; 3 SOLUTION RESPIRATORY (INHALATION) at 12:44

## 2022-01-01 RX ADMIN — METOPROLOL SUCCINATE 25 MG: 25 TABLET, EXTENDED RELEASE ORAL at 09:52

## 2022-01-01 RX ADMIN — SODIUM CHLORIDE 2 G: 900 INJECTION INTRAVENOUS at 13:52

## 2022-01-01 RX ADMIN — DOXYCYCLINE 100 MG: 100 INJECTION, POWDER, LYOPHILIZED, FOR SOLUTION INTRAVENOUS at 00:36

## 2022-01-01 RX ADMIN — BUSPIRONE HYDROCHLORIDE 10 MG: 10 TABLET ORAL at 19:55

## 2022-01-01 RX ADMIN — HEPARIN SODIUM 5000 UNITS: 5000 INJECTION, SOLUTION INTRAVENOUS; SUBCUTANEOUS at 06:25

## 2022-01-01 RX ADMIN — LORAZEPAM 0.5 MG: 0.5 TABLET ORAL at 13:04

## 2022-01-01 RX ADMIN — Medication 10 ML: at 09:02

## 2022-01-01 RX ADMIN — Medication 10 ML: at 08:52

## 2022-01-01 RX ADMIN — HEPARIN SODIUM 5000 UNITS: 5000 INJECTION, SOLUTION INTRAVENOUS; SUBCUTANEOUS at 13:52

## 2022-01-01 RX ADMIN — BUSPIRONE HYDROCHLORIDE 10 MG: 10 TABLET ORAL at 20:08

## 2022-01-01 RX ADMIN — AMLODIPINE BESYLATE 5 MG: 5 TABLET ORAL at 08:44

## 2022-01-01 RX ADMIN — FAMOTIDINE 10 MG: 20 TABLET, FILM COATED ORAL at 07:56

## 2022-01-01 RX ADMIN — PAROXETINE HYDROCHLORIDE 20 MG: 20 TABLET, FILM COATED ORAL at 09:32

## 2022-01-01 RX ADMIN — AMLODIPINE BESYLATE 5 MG: 5 TABLET ORAL at 16:29

## 2022-01-01 RX ADMIN — Medication 10 ML: at 21:46

## 2022-01-01 RX ADMIN — CEFEPIME 2 G: 2 INJECTION, POWDER, FOR SOLUTION INTRAVENOUS at 10:58

## 2022-01-01 RX ADMIN — FAMOTIDINE 10 MG: 20 TABLET ORAL at 06:40

## 2022-01-01 RX ADMIN — DOXYCYCLINE 100 MG: 100 CAPSULE ORAL at 08:51

## 2022-01-01 RX ADMIN — Medication 10 ML: at 21:43

## 2022-01-01 RX ADMIN — FAMOTIDINE 20 MG: 10 INJECTION INTRAVENOUS at 14:18

## 2022-01-01 RX ADMIN — IPRATROPIUM BROMIDE AND ALBUTEROL SULFATE 3 ML: 2.5; .5 SOLUTION RESPIRATORY (INHALATION) at 23:19

## 2022-01-01 RX ADMIN — LEVETIRACETAM 500 MG: 500 TABLET, FILM COATED ORAL at 08:09

## 2022-01-01 RX ADMIN — AMLODIPINE BESYLATE 5 MG: 5 TABLET ORAL at 08:42

## 2022-01-01 RX ADMIN — Medication 1 CAPSULE: at 09:32

## 2022-01-01 RX ADMIN — FAMOTIDINE 10 MG: 20 TABLET ORAL at 17:04

## 2022-01-01 RX ADMIN — DOXYCYCLINE 100 MG: 100 CAPSULE ORAL at 07:47

## 2022-01-01 RX ADMIN — LEVETIRACETAM 500 MG: 500 TABLET, FILM COATED ORAL at 08:07

## 2022-01-01 RX ADMIN — ENOXAPARIN SODIUM 40 MG: 40 INJECTION SUBCUTANEOUS at 20:36

## 2022-01-01 RX ADMIN — MORPHINE SULFATE 1 MG: 2 INJECTION, SOLUTION INTRAMUSCULAR; INTRAVENOUS at 19:48

## 2022-01-01 RX ADMIN — IPRATROPIUM BROMIDE AND ALBUTEROL SULFATE 3 ML: .5; 3 SOLUTION RESPIRATORY (INHALATION) at 23:48

## 2022-01-01 RX ADMIN — Medication 10 ML: at 20:45

## 2022-01-01 RX ADMIN — METHYLPREDNISOLONE SODIUM SUCCINATE 60 MG: 125 INJECTION, POWDER, FOR SOLUTION INTRAMUSCULAR; INTRAVENOUS at 21:30

## 2022-01-01 RX ADMIN — LEVETIRACETAM 500 MG: 5 INJECTION INTRAVENOUS at 20:59

## 2022-01-01 RX ADMIN — LEVETIRACETAM 500 MG: 500 TABLET, FILM COATED ORAL at 20:08

## 2022-01-01 RX ADMIN — Medication 10 ML: at 08:38

## 2022-01-01 RX ADMIN — POTASSIUM CHLORIDE 10 MEQ: 7.46 INJECTION, SOLUTION INTRAVENOUS at 02:12

## 2022-01-01 RX ADMIN — HEPARIN SODIUM 5000 UNITS: 5000 INJECTION, SOLUTION INTRAVENOUS; SUBCUTANEOUS at 13:38

## 2022-01-01 RX ADMIN — Medication 10 ML: at 20:35

## 2022-01-01 RX ADMIN — Medication 10 ML: at 21:01

## 2022-01-01 RX ADMIN — PAROXETINE HYDROCHLORIDE 20 MG: 20 TABLET, FILM COATED ORAL at 08:13

## 2022-01-01 RX ADMIN — IPRATROPIUM BROMIDE AND ALBUTEROL SULFATE 3 ML: 2.5; .5 SOLUTION RESPIRATORY (INHALATION) at 16:09

## 2022-01-01 RX ADMIN — POTASSIUM CHLORIDE 40 MEQ: 750 CAPSULE, EXTENDED RELEASE ORAL at 23:05

## 2022-01-01 RX ADMIN — CEFEPIME 2 G: 2 INJECTION, POWDER, FOR SOLUTION INTRAVENOUS at 12:32

## 2022-01-01 RX ADMIN — LEVETIRACETAM 500 MG: 500 TABLET, FILM COATED ORAL at 21:25

## 2022-01-01 RX ADMIN — POTASSIUM CHLORIDE 10 MEQ: 7.46 INJECTION, SOLUTION INTRAVENOUS at 20:02

## 2022-01-01 RX ADMIN — LIDOCAINE HYDROCHLORIDE 50 ML: 10 INJECTION, SOLUTION EPIDURAL; INFILTRATION; INTRACAUDAL; PERINEURAL at 15:01

## 2022-01-01 RX ADMIN — LEVETIRACETAM 500 MG: 500 TABLET, FILM COATED ORAL at 09:29

## 2022-01-01 RX ADMIN — IPRATROPIUM BROMIDE AND ALBUTEROL SULFATE 3 ML: .5; 3 SOLUTION RESPIRATORY (INHALATION) at 15:43

## 2022-01-01 RX ADMIN — LEVETIRACETAM 500 MG: 500 TABLET, FILM COATED ORAL at 09:03

## 2022-01-01 RX ADMIN — FAMOTIDINE 10 MG: 20 TABLET ORAL at 17:27

## 2022-01-01 RX ADMIN — BARIUM SULFATE 20 ML: 400 PASTE ORAL at 15:29

## 2022-01-01 RX ADMIN — Medication 10 ML: at 08:09

## 2022-01-01 RX ADMIN — IPRATROPIUM BROMIDE AND ALBUTEROL SULFATE 3 ML: .5; 3 SOLUTION RESPIRATORY (INHALATION) at 03:07

## 2022-01-01 RX ADMIN — Medication 1 CAPSULE: at 10:09

## 2022-01-01 RX ADMIN — IPRATROPIUM BROMIDE AND ALBUTEROL SULFATE 3 ML: .5; 3 SOLUTION RESPIRATORY (INHALATION) at 22:37

## 2022-01-01 RX ADMIN — DOXYCYCLINE 100 MG: 100 CAPSULE ORAL at 09:32

## 2022-01-01 RX ADMIN — BUSPIRONE HYDROCHLORIDE 10 MG: 10 TABLET ORAL at 18:38

## 2022-01-01 RX ADMIN — FAMOTIDINE 10 MG: 20 TABLET ORAL at 16:48

## 2022-01-01 RX ADMIN — BUSPIRONE HYDROCHLORIDE 10 MG: 10 TABLET ORAL at 19:57

## 2022-01-01 RX ADMIN — METHYLPREDNISOLONE SODIUM SUCCINATE 60 MG: 125 INJECTION, POWDER, FOR SOLUTION INTRAMUSCULAR; INTRAVENOUS at 12:38

## 2022-01-01 RX ADMIN — METHYLPREDNISOLONE SODIUM SUCCINATE 40 MG: 40 INJECTION, POWDER, FOR SOLUTION INTRAMUSCULAR; INTRAVENOUS at 06:01

## 2022-01-01 RX ADMIN — LEVETIRACETAM 500 MG: 500 TABLET, FILM COATED ORAL at 08:29

## 2022-01-01 RX ADMIN — FAMOTIDINE 10 MG: 20 TABLET, FILM COATED ORAL at 17:00

## 2022-01-01 RX ADMIN — HEPARIN SODIUM 5000 UNITS: 5000 INJECTION, SOLUTION INTRAVENOUS; SUBCUTANEOUS at 21:26

## 2022-01-01 RX ADMIN — PREDNISONE 40 MG: 20 TABLET ORAL at 08:37

## 2022-01-01 RX ADMIN — FAMOTIDINE 10 MG: 20 TABLET, FILM COATED ORAL at 09:52

## 2022-01-01 RX ADMIN — IPRATROPIUM BROMIDE AND ALBUTEROL SULFATE 3 ML: .5; 3 SOLUTION RESPIRATORY (INHALATION) at 11:29

## 2022-01-01 RX ADMIN — IPRATROPIUM BROMIDE AND ALBUTEROL SULFATE 3 ML: .5; 3 SOLUTION RESPIRATORY (INHALATION) at 19:01

## 2022-01-01 RX ADMIN — AMLODIPINE BESYLATE 5 MG: 5 TABLET ORAL at 08:54

## 2022-01-01 RX ADMIN — IPRATROPIUM BROMIDE AND ALBUTEROL SULFATE 3 ML: .5; 3 SOLUTION RESPIRATORY (INHALATION) at 08:15

## 2022-01-01 RX ADMIN — HEPARIN SODIUM 5000 UNITS: 5000 INJECTION, SOLUTION INTRAVENOUS; SUBCUTANEOUS at 08:18

## 2022-01-01 RX ADMIN — AMLODIPINE BESYLATE 5 MG: 5 TABLET ORAL at 08:45

## 2022-01-01 RX ADMIN — PREDNISONE 30 MG: 20 TABLET ORAL at 09:10

## 2022-01-01 RX ADMIN — METHYLPREDNISOLONE SODIUM SUCCINATE 60 MG: 125 INJECTION, POWDER, FOR SOLUTION INTRAMUSCULAR; INTRAVENOUS at 13:25

## 2022-01-01 RX ADMIN — BUSPIRONE HYDROCHLORIDE 10 MG: 10 TABLET ORAL at 17:10

## 2022-01-01 RX ADMIN — SODIUM CHLORIDE, POTASSIUM CHLORIDE, SODIUM LACTATE AND CALCIUM CHLORIDE: 600; 310; 30; 20 INJECTION, SOLUTION INTRAVENOUS at 14:55

## 2022-01-01 RX ADMIN — DOXYCYCLINE 100 MG: 100 CAPSULE ORAL at 21:31

## 2022-01-01 RX ADMIN — POTASSIUM CHLORIDE 40 MEQ: 750 CAPSULE, EXTENDED RELEASE ORAL at 14:15

## 2022-01-01 RX ADMIN — PAROXETINE HYDROCHLORIDE 20 MG: 20 TABLET, FILM COATED ORAL at 16:29

## 2022-01-01 RX ADMIN — METHYLPREDNISOLONE SODIUM SUCCINATE 40 MG: 40 INJECTION, POWDER, FOR SOLUTION INTRAMUSCULAR; INTRAVENOUS at 05:55

## 2022-01-01 RX ADMIN — LEVETIRACETAM 500 MG: 500 TABLET, FILM COATED ORAL at 21:00

## 2022-01-01 RX ADMIN — METHYLPREDNISOLONE SODIUM SUCCINATE 20 MG: 40 INJECTION, POWDER, FOR SOLUTION INTRAMUSCULAR; INTRAVENOUS at 08:29

## 2022-01-01 RX ADMIN — PREDNISONE 40 MG: 20 TABLET ORAL at 08:40

## 2022-01-01 RX ADMIN — METHYLPREDNISOLONE SODIUM SUCCINATE 60 MG: 125 INJECTION, POWDER, FOR SOLUTION INTRAMUSCULAR; INTRAVENOUS at 09:26

## 2022-01-01 RX ADMIN — DOXYCYCLINE 100 MG: 100 CAPSULE ORAL at 08:43

## 2022-01-01 RX ADMIN — IPRATROPIUM BROMIDE AND ALBUTEROL SULFATE 3 ML: .5; 3 SOLUTION RESPIRATORY (INHALATION) at 20:42

## 2022-01-01 RX ADMIN — BUDESONIDE AND FORMOTEROL FUMARATE DIHYDRATE 2 PUFF: 80; 4.5 AEROSOL RESPIRATORY (INHALATION) at 09:38

## 2022-01-01 RX ADMIN — GUAIFENESIN 1200 MG: 600 TABLET, EXTENDED RELEASE ORAL at 12:18

## 2022-01-01 RX ADMIN — LEVETIRACETAM 500 MG: 5 INJECTION INTRAVENOUS at 11:37

## 2022-01-01 RX ADMIN — HEPARIN SODIUM 5000 UNITS: 5000 INJECTION, SOLUTION INTRAVENOUS; SUBCUTANEOUS at 07:19

## 2022-01-01 RX ADMIN — LEVETIRACETAM 500 MG: 500 TABLET, FILM COATED ORAL at 21:44

## 2022-01-01 RX ADMIN — METHYLPREDNISOLONE SODIUM SUCCINATE 40 MG: 40 INJECTION, POWDER, FOR SOLUTION INTRAMUSCULAR; INTRAVENOUS at 21:46

## 2022-01-01 RX ADMIN — Medication 1 CAPSULE: at 16:29

## 2022-01-01 RX ADMIN — Medication 1 CAPSULE: at 08:08

## 2022-01-01 RX ADMIN — HYDROMORPHONE HYDROCHLORIDE 0.25 MG: 1 INJECTION, SOLUTION INTRAMUSCULAR; INTRAVENOUS; SUBCUTANEOUS at 08:36

## 2022-01-01 RX ADMIN — FAMOTIDINE 10 MG: 20 TABLET ORAL at 05:56

## 2022-01-01 RX ADMIN — BUSPIRONE HYDROCHLORIDE 10 MG: 10 TABLET ORAL at 20:47

## 2022-01-01 RX ADMIN — FAMOTIDINE 10 MG: 20 TABLET ORAL at 17:41

## 2022-01-01 RX ADMIN — HEPARIN SODIUM 5000 UNITS: 5000 INJECTION, SOLUTION INTRAVENOUS; SUBCUTANEOUS at 14:00

## 2022-01-01 RX ADMIN — FAMOTIDINE 10 MG: 20 TABLET ORAL at 18:35

## 2022-01-01 RX ADMIN — AMLODIPINE BESYLATE 5 MG: 5 TABLET ORAL at 09:32

## 2022-01-01 RX ADMIN — AMLODIPINE BESYLATE 5 MG: 5 TABLET ORAL at 08:13

## 2022-01-01 RX ADMIN — LORAZEPAM 1 MG: 1 TABLET ORAL at 16:35

## 2022-01-01 RX ADMIN — DOXYCYCLINE 100 MG: 100 CAPSULE ORAL at 20:09

## 2022-01-01 RX ADMIN — IPRATROPIUM BROMIDE AND ALBUTEROL SULFATE 3 ML: 2.5; .5 SOLUTION RESPIRATORY (INHALATION) at 12:28

## 2022-01-01 RX ADMIN — IPRATROPIUM BROMIDE AND ALBUTEROL SULFATE 3 ML: 2.5; .5 SOLUTION RESPIRATORY (INHALATION) at 15:09

## 2022-01-01 RX ADMIN — Medication 10 ML: at 09:45

## 2022-01-01 RX ADMIN — LEVETIRACETAM 500 MG: 500 TABLET, FILM COATED ORAL at 08:42

## 2022-01-01 RX ADMIN — SODIUM CHLORIDE 2 G: 900 INJECTION INTRAVENOUS at 11:41

## 2022-01-01 RX ADMIN — BUSPIRONE HYDROCHLORIDE 10 MG: 10 TABLET ORAL at 20:02

## 2022-01-01 RX ADMIN — IPRATROPIUM BROMIDE AND ALBUTEROL SULFATE 3 ML: .5; 3 SOLUTION RESPIRATORY (INHALATION) at 11:27

## 2022-01-01 RX ADMIN — ONDANSETRON 4 MG: 2 INJECTION INTRAMUSCULAR; INTRAVENOUS at 08:36

## 2022-01-01 RX ADMIN — LEVETIRACETAM 500 MG: 500 TABLET, FILM COATED ORAL at 19:56

## 2022-01-01 RX ADMIN — BUDESONIDE AND FORMOTEROL FUMARATE DIHYDRATE 2 PUFF: 80; 4.5 AEROSOL RESPIRATORY (INHALATION) at 07:55

## 2022-01-01 RX ADMIN — ENOXAPARIN SODIUM 40 MG: 40 INJECTION SUBCUTANEOUS at 20:08

## 2022-01-01 RX ADMIN — BUSPIRONE HYDROCHLORIDE 10 MG: 10 TABLET ORAL at 20:17

## 2022-01-01 RX ADMIN — SODIUM CHLORIDE 1 G: 900 INJECTION INTRAVENOUS at 17:52

## 2022-01-01 RX ADMIN — SODIUM CHLORIDE 1 G: 900 INJECTION INTRAVENOUS at 22:50

## 2022-01-01 RX ADMIN — POTASSIUM CHLORIDE 10 MEQ: 7.46 INJECTION, SOLUTION INTRAVENOUS at 09:05

## 2022-01-01 RX ADMIN — Medication 1 CAPSULE: at 08:57

## 2022-01-01 RX ADMIN — FAMOTIDINE 10 MG: 20 TABLET, FILM COATED ORAL at 17:04

## 2022-01-01 RX ADMIN — MORPHINE SULFATE 1 MG: 2 INJECTION, SOLUTION INTRAMUSCULAR; INTRAVENOUS at 04:50

## 2022-01-01 RX ADMIN — DOXYCYCLINE 100 MG: 100 CAPSULE ORAL at 20:47

## 2022-01-01 RX ADMIN — LORAZEPAM 1 MG: 1 TABLET ORAL at 08:16

## 2022-01-01 RX ADMIN — BUSPIRONE HYDROCHLORIDE 10 MG: 10 TABLET ORAL at 08:13

## 2022-01-01 RX ADMIN — POTASSIUM CHLORIDE 10 MEQ: 7.46 INJECTION, SOLUTION INTRAVENOUS at 03:04

## 2022-01-01 RX ADMIN — METHYLPREDNISOLONE SODIUM SUCCINATE 60 MG: 125 INJECTION, POWDER, FOR SOLUTION INTRAMUSCULAR; INTRAVENOUS at 13:12

## 2022-01-01 RX ADMIN — BUDESONIDE AND FORMOTEROL FUMARATE DIHYDRATE 2 PUFF: 80; 4.5 AEROSOL RESPIRATORY (INHALATION) at 20:39

## 2022-01-01 RX ADMIN — PAROXETINE HYDROCHLORIDE 20 MG: 20 TABLET, FILM COATED ORAL at 08:09

## 2022-01-01 RX ADMIN — HEPARIN SODIUM 5000 UNITS: 5000 INJECTION, SOLUTION INTRAVENOUS; SUBCUTANEOUS at 06:11

## 2022-01-01 RX ADMIN — HEPARIN SODIUM 5000 UNITS: 5000 INJECTION, SOLUTION INTRAVENOUS; SUBCUTANEOUS at 06:13

## 2022-01-01 RX ADMIN — Medication 100 MCG: at 15:21

## 2022-01-01 RX ADMIN — LEVETIRACETAM 500 MG: 500 TABLET, FILM COATED ORAL at 07:56

## 2022-01-01 RX ADMIN — FAMOTIDINE 10 MG: 20 TABLET ORAL at 06:25

## 2022-01-01 RX ADMIN — IPRATROPIUM BROMIDE AND ALBUTEROL SULFATE 3 ML: .5; 3 SOLUTION RESPIRATORY (INHALATION) at 15:35

## 2022-01-01 RX ADMIN — BUSPIRONE HYDROCHLORIDE 10 MG: 10 TABLET ORAL at 08:41

## 2022-01-01 RX ADMIN — PAROXETINE HYDROCHLORIDE 20 MG: 20 TABLET, FILM COATED ORAL at 09:03

## 2022-01-01 RX ADMIN — TRIAMTERENE AND HYDROCHLOROTHIAZIDE 1 TABLET: 37.5; 25 TABLET ORAL at 08:52

## 2022-01-01 RX ADMIN — LEVETIRACETAM 500 MG: 500 TABLET, FILM COATED ORAL at 21:16

## 2022-01-01 RX ADMIN — CEFEPIME 2 G: 2 INJECTION, POWDER, FOR SOLUTION INTRAVENOUS at 18:35

## 2022-01-01 RX ADMIN — Medication 10 ML: at 21:31

## 2022-01-01 RX ADMIN — PREDNISONE 30 MG: 20 TABLET ORAL at 08:58

## 2022-01-01 RX ADMIN — IPRATROPIUM BROMIDE AND ALBUTEROL SULFATE 3 ML: .5; 3 SOLUTION RESPIRATORY (INHALATION) at 03:34

## 2022-01-01 RX ADMIN — IPRATROPIUM BROMIDE AND ALBUTEROL SULFATE 3 ML: .5; 3 SOLUTION RESPIRATORY (INHALATION) at 08:40

## 2022-01-01 RX ADMIN — METHYLPREDNISOLONE SODIUM SUCCINATE 20 MG: 40 INJECTION, POWDER, FOR SOLUTION INTRAMUSCULAR; INTRAVENOUS at 22:06

## 2022-01-01 RX ADMIN — IPRATROPIUM BROMIDE AND ALBUTEROL SULFATE 3 ML: .5; 3 SOLUTION RESPIRATORY (INHALATION) at 10:54

## 2022-01-01 RX ADMIN — IPRATROPIUM BROMIDE AND ALBUTEROL SULFATE 3 ML: .5; 3 SOLUTION RESPIRATORY (INHALATION) at 07:42

## 2022-01-01 RX ADMIN — POTASSIUM CHLORIDE 10 MEQ: 7.46 INJECTION, SOLUTION INTRAVENOUS at 01:54

## 2022-01-01 RX ADMIN — POTASSIUM CHLORIDE 10 MEQ: 7.46 INJECTION, SOLUTION INTRAVENOUS at 05:49

## 2022-01-01 RX ADMIN — FUROSEMIDE 40 MG: 10 INJECTION, SOLUTION INTRAMUSCULAR; INTRAVENOUS at 08:59

## 2022-01-01 RX ADMIN — FAMOTIDINE 10 MG: 20 TABLET ORAL at 16:36

## 2022-01-01 RX ADMIN — ENOXAPARIN SODIUM 40 MG: 40 INJECTION SUBCUTANEOUS at 20:00

## 2022-01-01 RX ADMIN — IPRATROPIUM BROMIDE AND ALBUTEROL SULFATE 3 ML: .5; 3 SOLUTION RESPIRATORY (INHALATION) at 14:56

## 2022-01-01 RX ADMIN — IPRATROPIUM BROMIDE AND ALBUTEROL SULFATE 3 ML: 2.5; .5 SOLUTION RESPIRATORY (INHALATION) at 06:00

## 2022-01-01 RX ADMIN — ENOXAPARIN SODIUM 40 MG: 40 INJECTION SUBCUTANEOUS at 21:00

## 2022-01-01 RX ADMIN — IPRATROPIUM BROMIDE AND ALBUTEROL SULFATE 3 ML: .5; 3 SOLUTION RESPIRATORY (INHALATION) at 12:17

## 2022-01-01 RX ADMIN — PAROXETINE HYDROCHLORIDE 20 MG: 20 TABLET, FILM COATED ORAL at 07:57

## 2022-01-01 RX ADMIN — BUSPIRONE HYDROCHLORIDE 10 MG: 10 TABLET ORAL at 08:37

## 2022-01-01 RX ADMIN — MORPHINE SULFATE 1 MG: 2 INJECTION, SOLUTION INTRAMUSCULAR; INTRAVENOUS at 23:57

## 2022-01-01 RX ADMIN — LEVETIRACETAM 500 MG: 500 TABLET, FILM COATED ORAL at 22:54

## 2022-01-01 RX ADMIN — LEVETIRACETAM 500 MG: 500 TABLET, FILM COATED ORAL at 08:51

## 2022-01-01 RX ADMIN — FAMOTIDINE 10 MG: 20 TABLET, FILM COATED ORAL at 16:54

## 2022-01-01 RX ADMIN — BUSPIRONE HYDROCHLORIDE 10 MG: 10 TABLET ORAL at 20:45

## 2022-01-01 RX ADMIN — SODIUM CHLORIDE 500 ML: 9 INJECTION, SOLUTION INTRAVENOUS at 10:35

## 2022-01-01 RX ADMIN — FAMOTIDINE 10 MG: 20 TABLET ORAL at 10:09

## 2022-01-01 RX ADMIN — FAMOTIDINE 10 MG: 20 TABLET, FILM COATED ORAL at 09:03

## 2022-01-01 RX ADMIN — IPRATROPIUM BROMIDE AND ALBUTEROL SULFATE 3 ML: 2.5; .5 SOLUTION RESPIRATORY (INHALATION) at 19:07

## 2022-01-01 RX ADMIN — SENNOSIDES AND DOCUSATE SODIUM 2 TABLET: 50; 8.6 TABLET ORAL at 09:32

## 2022-01-01 RX ADMIN — PAROXETINE HYDROCHLORIDE 20 MG: 20 TABLET, FILM COATED ORAL at 08:28

## 2022-01-01 RX ADMIN — LORAZEPAM 0.5 MG: 0.5 TABLET ORAL at 16:28

## 2022-01-01 RX ADMIN — ENOXAPARIN SODIUM 40 MG: 40 INJECTION SUBCUTANEOUS at 21:15

## 2022-01-01 RX ADMIN — AMLODIPINE BESYLATE 5 MG: 5 TABLET ORAL at 08:08

## 2022-01-01 RX ADMIN — LEVETIRACETAM 500 MG: 500 TABLET, FILM COATED ORAL at 09:53

## 2022-01-01 RX ADMIN — BUSPIRONE HYDROCHLORIDE 10 MG: 10 TABLET ORAL at 08:45

## 2022-01-01 RX ADMIN — SODIUM CHLORIDE 1 G: 900 INJECTION INTRAVENOUS at 16:26

## 2022-01-01 RX ADMIN — METHYLPREDNISOLONE SODIUM SUCCINATE 60 MG: 125 INJECTION, POWDER, FOR SOLUTION INTRAMUSCULAR; INTRAVENOUS at 06:33

## 2022-01-01 RX ADMIN — LORAZEPAM 1 MG: 1 TABLET ORAL at 17:08

## 2022-01-01 RX ADMIN — Medication 10 ML: at 09:17

## 2022-01-01 RX ADMIN — PREDNISONE 30 MG: 20 TABLET ORAL at 08:09

## 2022-01-01 RX ADMIN — IPRATROPIUM BROMIDE AND ALBUTEROL SULFATE 3 ML: .5; 3 SOLUTION RESPIRATORY (INHALATION) at 08:04

## 2022-01-01 RX ADMIN — IPRATROPIUM BROMIDE AND ALBUTEROL SULFATE 3 ML: .5; 3 SOLUTION RESPIRATORY (INHALATION) at 07:27

## 2022-01-01 RX ADMIN — AMLODIPINE BESYLATE 5 MG: 5 TABLET ORAL at 07:56

## 2022-01-01 RX ADMIN — DOXYCYCLINE 100 MG: 100 CAPSULE ORAL at 21:46

## 2022-01-01 RX ADMIN — HEPARIN SODIUM 5000 UNITS: 5000 INJECTION, SOLUTION INTRAVENOUS; SUBCUTANEOUS at 21:30

## 2022-01-01 RX ADMIN — IPRATROPIUM BROMIDE AND ALBUTEROL SULFATE 3 ML: 2.5; .5 SOLUTION RESPIRATORY (INHALATION) at 23:46

## 2022-01-01 RX ADMIN — IPRATROPIUM BROMIDE AND ALBUTEROL SULFATE 3 ML: 2.5; .5 SOLUTION RESPIRATORY (INHALATION) at 03:23

## 2022-01-01 RX ADMIN — IPRATROPIUM BROMIDE AND ALBUTEROL SULFATE 3 ML: .5; 3 SOLUTION RESPIRATORY (INHALATION) at 12:28

## 2022-01-01 RX ADMIN — DOXYCYCLINE 100 MG: 100 INJECTION, POWDER, LYOPHILIZED, FOR SOLUTION INTRAVENOUS at 16:08

## 2022-01-01 RX ADMIN — ENOXAPARIN SODIUM 40 MG: 40 INJECTION SUBCUTANEOUS at 20:15

## 2022-01-01 RX ADMIN — IPRATROPIUM BROMIDE AND ALBUTEROL SULFATE 3 ML: .5; 3 SOLUTION RESPIRATORY (INHALATION) at 19:34

## 2022-01-01 RX ADMIN — DOXYCYCLINE 100 MG: 100 INJECTION, POWDER, LYOPHILIZED, FOR SOLUTION INTRAVENOUS at 09:55

## 2022-01-01 RX ADMIN — CEFEPIME 2 G: 2 INJECTION, POWDER, FOR SOLUTION INTRAVENOUS at 20:02

## 2022-01-01 RX ADMIN — IPRATROPIUM BROMIDE AND ALBUTEROL SULFATE 3 ML: .5; 3 SOLUTION RESPIRATORY (INHALATION) at 06:55

## 2022-01-01 RX ADMIN — IPRATROPIUM BROMIDE AND ALBUTEROL SULFATE 3 ML: .5; 3 SOLUTION RESPIRATORY (INHALATION) at 23:51

## 2022-01-01 RX ADMIN — IPRATROPIUM BROMIDE AND ALBUTEROL SULFATE 3 ML: .5; 3 SOLUTION RESPIRATORY (INHALATION) at 23:17

## 2022-01-01 RX ADMIN — HEPARIN SODIUM 5000 UNITS: 5000 INJECTION, SOLUTION INTRAVENOUS; SUBCUTANEOUS at 20:35

## 2022-01-01 RX ADMIN — IPRATROPIUM BROMIDE AND ALBUTEROL SULFATE 3 ML: 2.5; .5 SOLUTION RESPIRATORY (INHALATION) at 13:34

## 2022-01-01 RX ADMIN — IPRATROPIUM BROMIDE AND ALBUTEROL SULFATE 3 ML: .5; 3 SOLUTION RESPIRATORY (INHALATION) at 20:32

## 2022-01-01 RX ADMIN — HEPARIN SODIUM 5000 UNITS: 5000 INJECTION, SOLUTION INTRAVENOUS; SUBCUTANEOUS at 22:54

## 2022-01-01 RX ADMIN — LORAZEPAM 1 MG: 1 TABLET ORAL at 08:44

## 2022-01-01 RX ADMIN — POTASSIUM CHLORIDE 10 MEQ: 7.46 INJECTION, SOLUTION INTRAVENOUS at 11:21

## 2022-01-01 RX ADMIN — POTASSIUM CHLORIDE 10 MEQ: 7.46 INJECTION, SOLUTION INTRAVENOUS at 18:30

## 2022-01-01 RX ADMIN — DIAZEPAM 5 MG: 5 TABLET ORAL at 21:51

## 2022-01-01 RX ADMIN — DEXAMETHASONE SODIUM PHOSPHATE 6 MG: 4 INJECTION, SOLUTION INTRAMUSCULAR; INTRAVENOUS at 15:33

## 2022-01-01 RX ADMIN — SODIUM CHLORIDE 2 G: 900 INJECTION INTRAVENOUS at 11:22

## 2022-01-01 RX ADMIN — IPRATROPIUM BROMIDE AND ALBUTEROL SULFATE 3 ML: .5; 3 SOLUTION RESPIRATORY (INHALATION) at 20:22

## 2022-01-01 RX ADMIN — HEPARIN SODIUM 5000 UNITS: 5000 INJECTION, SOLUTION INTRAVENOUS; SUBCUTANEOUS at 21:25

## 2022-01-01 RX ADMIN — Medication 10 ML: at 08:29

## 2022-01-01 RX ADMIN — BUDESONIDE AND FORMOTEROL FUMARATE DIHYDRATE 2 PUFF: 80; 4.5 AEROSOL RESPIRATORY (INHALATION) at 06:00

## 2022-01-01 RX ADMIN — Medication 1 CAPSULE: at 08:45

## 2022-01-01 RX ADMIN — PREDNISONE 30 MG: 20 TABLET ORAL at 09:29

## 2022-01-01 RX ADMIN — DIAZEPAM 5 MG: 5 TABLET ORAL at 18:19

## 2022-01-01 RX ADMIN — IPRATROPIUM BROMIDE AND ALBUTEROL SULFATE 3 ML: .5; 3 SOLUTION RESPIRATORY (INHALATION) at 23:16

## 2022-01-01 RX ADMIN — IPRATROPIUM BROMIDE AND ALBUTEROL SULFATE 3 ML: 2.5; .5 SOLUTION RESPIRATORY (INHALATION) at 03:26

## 2022-01-01 RX ADMIN — IPRATROPIUM BROMIDE AND ALBUTEROL SULFATE 3 ML: .5; 3 SOLUTION RESPIRATORY (INHALATION) at 02:51

## 2022-01-01 RX ADMIN — METOPROLOL TARTRATE 5 MG: 5 INJECTION INTRAVENOUS at 05:42

## 2022-01-01 RX ADMIN — IPRATROPIUM BROMIDE AND ALBUTEROL SULFATE 3 ML: 2.5; .5 SOLUTION RESPIRATORY (INHALATION) at 12:50

## 2022-01-01 RX ADMIN — LORAZEPAM 1 MG: 1 TABLET ORAL at 08:58

## 2022-01-01 RX ADMIN — POTASSIUM CHLORIDE 10 MEQ: 7.46 INJECTION, SOLUTION INTRAVENOUS at 12:59

## 2022-01-01 RX ADMIN — Medication 100 MCG: at 15:20

## 2022-01-01 RX ADMIN — METHYLPREDNISOLONE SODIUM SUCCINATE 40 MG: 40 INJECTION, POWDER, FOR SOLUTION INTRAMUSCULAR; INTRAVENOUS at 13:23

## 2022-01-01 RX ADMIN — IPRATROPIUM BROMIDE AND ALBUTEROL SULFATE 3 ML: .5; 3 SOLUTION RESPIRATORY (INHALATION) at 15:31

## 2022-01-01 RX ADMIN — Medication 10 ML: at 21:16

## 2022-01-01 RX ADMIN — IPRATROPIUM BROMIDE AND ALBUTEROL SULFATE 3 ML: 2.5; .5 SOLUTION RESPIRATORY (INHALATION) at 07:50

## 2022-01-01 RX ADMIN — LEVETIRACETAM 500 MG: 500 TABLET, FILM COATED ORAL at 19:57

## 2022-01-01 RX ADMIN — METHYLPREDNISOLONE SODIUM SUCCINATE 40 MG: 40 INJECTION, POWDER, FOR SOLUTION INTRAMUSCULAR; INTRAVENOUS at 15:01

## 2022-01-01 RX ADMIN — BUSPIRONE HYDROCHLORIDE 10 MG: 10 TABLET ORAL at 18:20

## 2022-01-01 RX ADMIN — METHYLPREDNISOLONE SODIUM SUCCINATE 40 MG: 40 INJECTION, POWDER, FOR SOLUTION INTRAMUSCULAR; INTRAVENOUS at 21:43

## 2022-01-01 RX ADMIN — IPRATROPIUM BROMIDE AND ALBUTEROL SULFATE 3 ML: .5; 3 SOLUTION RESPIRATORY (INHALATION) at 03:08

## 2022-01-01 RX ADMIN — IPRATROPIUM BROMIDE AND ALBUTEROL SULFATE 3 ML: .5; 3 SOLUTION RESPIRATORY (INHALATION) at 08:12

## 2022-01-01 RX ADMIN — ENOXAPARIN SODIUM 40 MG: 40 INJECTION SUBCUTANEOUS at 21:43

## 2022-01-01 RX ADMIN — BUSPIRONE HYDROCHLORIDE 10 MG: 10 TABLET ORAL at 20:36

## 2022-01-01 RX ADMIN — METOPROLOL TARTRATE 2.5 MG: 5 INJECTION INTRAVENOUS at 05:38

## 2022-01-01 RX ADMIN — ROPIVACAINE HYDROCHLORIDE 25 ML: 5 INJECTION, SOLUTION EPIDURAL; INFILTRATION; PERINEURAL at 10:35

## 2022-01-01 RX ADMIN — AMLODIPINE BESYLATE 5 MG: 5 TABLET ORAL at 17:40

## 2022-01-01 RX ADMIN — GUAIFENESIN 1200 MG: 600 TABLET, EXTENDED RELEASE ORAL at 20:35

## 2022-01-01 RX ADMIN — Medication 10 ML: at 20:00

## 2022-01-01 RX ADMIN — Medication 1 CAPSULE: at 09:03

## 2022-01-01 RX ADMIN — BUSPIRONE HYDROCHLORIDE 10 MG: 10 TABLET ORAL at 10:09

## 2022-01-01 RX ADMIN — METOPROLOL SUCCINATE 25 MG: 25 TABLET, EXTENDED RELEASE ORAL at 10:09

## 2022-01-01 RX ADMIN — ENOXAPARIN SODIUM 40 MG: 40 INJECTION SUBCUTANEOUS at 20:45

## 2022-01-01 RX ADMIN — HEPARIN SODIUM 5000 UNITS: 5000 INJECTION, SOLUTION INTRAVENOUS; SUBCUTANEOUS at 13:12

## 2022-01-01 RX ADMIN — PAROXETINE HYDROCHLORIDE 20 MG: 20 TABLET, FILM COATED ORAL at 08:51

## 2022-01-01 RX ADMIN — ACETAMINOPHEN 325MG 650 MG: 325 TABLET ORAL at 06:17

## 2022-02-22 PROBLEM — J18.9 PNEUMONIA OF RIGHT LOWER LOBE DUE TO INFECTIOUS ORGANISM: Status: ACTIVE | Noted: 2022-01-01

## 2022-02-22 PROBLEM — J43.9 LUNG BLEBS: Status: ACTIVE | Noted: 2022-01-01

## 2022-02-22 PROBLEM — K21.9 GERD WITHOUT ESOPHAGITIS: Status: ACTIVE | Noted: 2022-01-01

## 2022-02-22 PROBLEM — J44.1 COPD WITH ACUTE EXACERBATION (HCC): Status: ACTIVE | Noted: 2022-01-01

## 2022-02-22 PROBLEM — N28.89 LEFT RENAL MASS: Status: ACTIVE | Noted: 2022-01-01

## 2022-02-22 PROBLEM — J18.9 CAP (COMMUNITY ACQUIRED PNEUMONIA): Status: ACTIVE | Noted: 2022-01-01

## 2022-02-22 PROBLEM — R73.9 HYPERGLYCEMIA: Status: ACTIVE | Noted: 2022-01-01

## 2022-02-22 PROBLEM — F41.1 GAD (GENERALIZED ANXIETY DISORDER): Status: ACTIVE | Noted: 2022-01-01

## 2022-02-23 PROBLEM — E43 SEVERE MALNUTRITION: Status: ACTIVE | Noted: 2022-01-01

## 2022-03-01 NOTE — THERAPY TREATMENT NOTE
Patient Name: Sierra Saba  : 1940    MRN: [id number]                              Today's Date: 3/1/2022       Admit Date: 2022    Visit Dx:     ICD-10-CM ICD-9-CM   1. Pneumonia of right lower lobe due to infectious organism  J18.9 486   2. Acute respiratory failure with hypoxia and hypercapnia ([facility])  J96.01 518.81    J96.02    3. Acute exacerbation of chronic obstructive pulmonary disease (COPD) ([facility])  J44.1 491.21   4. Cough  R05.9 786.2   5. Chest tightness  R07.89 786.59   6. History of emphysema  Z87.09 V12.69   7. Emphysematous bleb of lung ([facility])  J43.9 492.0   8. Generalized weakness  R53.1 780.79   9. Former smoker  Z87.891 V15.82   10. History of pneumothorax  Z87. V12.69   11. Impaired functional mobility, balance, gait, and endurance  Z74.09 V49.89     Patient Active Problem List   Diagnosis   • Cough   • Fall   • Acute kidney injury (HCC)   • Essential hypertension   • COPD with acute exacerbation ([facility])   • Lung blebs ([facility])   • Left renal mass   • CAP (community acquired pneumonia)   • GERD without esophagitis   • KIM (generalized anxiety disorder)   • Hyperglycemia   • Pneumonia of right lower lobe due to infectious organism   • Severe malnutrition (CMS/HCC)     Past Medical History:   Diagnosis Date   • Hypertension      Past Surgical History:   Procedure Laterality Date   • BACK SURGERY     • CHEST TUBE INSERTION Left 10/20/2021   • CHOLECYSTECTOMY     • HYSTERECTOMY     • TOTAL HIP ARTHROPLASTY        General Information     Row Name 22 1217          Physical Therapy Time and Intention    Document Type therapy note (daily note)  -DM     Mode of Treatment physical therapy  -DM     Row Name 22 1217          General Information    Existing Precautions/Restrictions fall; oxygen therapy device and L/min; seizures; other (see comments)  Anx; mesh panty/pad  -DM           User Key  (r) = Recorded By, (t) = Taken By, (c) = Cosigned By    Initials Name Provider Type    DM Charlie  Enid NAVARRO, PT Physical Therapist               Mobility     Row Name 03/01/22 1217          Bed Mobility    Bed Mobility rolling right; scooting/bridging; supine-sit; sit-supine; rolling left  -DM     Rolling Left [place] (Bed Mobility) verbal cues; standby assist  -DM     Rolling Right [place] (Bed Mobility) verbal cues; standby assist  -DM     Scooting/Bridging [place] (Bed Mobility) verbal cues; minimum assist (75% patient effort)  min A to scoot to HOB using draw sheet  -DM     Supine-Sit [place] (Bed Mobility) supervision  -DM     Sit-Supine [place] (Bed Mobility) supervision  -DM     Assistive Device (Bed Mobility) bed rails; draw sheet; head of bed elevated  -DM     Comment (Bed Mobility) respirex to 500 cc,then req BSC for BM;Issued mask & new tdsocks d/t pt c/o her being too scratchy; brought BSC  -DM     Row Name 03/01/22 1217          Transfers    Comment (Transfers) vc's for HP,seq w/ BSC transf  -DM     Row Name 03/01/22 1217          Sit-Stand Transfer    Sit-Stand [place] (Transfers) verbal cues; contact guard  -DM     Assistive Device (Sit-Stand Transfers) walker, front-wheeled  decl AD for SPT to/from BSC, but used R wx for 3rd STS transf p/t gt in room; req. 10 min toileting/hygiene  -DM     Row Name 03/01/22 1217          Gait/Stairs (Locomotion)    [place] Level (Gait) verbal cues; minimum assist (75% patient effort)  sidestepped 2 ft x 2 to/from BSC; 4 Min sit rest S/P,then 32 ft in room;mod SOB;Decl. UIC;transf to bed for nap;, then 106 s/p sup.  -DM     Assistive Device (Gait) walker, front-wheeled  -DM     Distance in Feet (Gait) 36  -DM     Deviations/Abnormal Patterns (Gait) bilateral deviations; base of support, narrow; lisa decreased; stride length decreased; weight shifting decreased  decr step length;drifts L  -DM     Bilateral Gait Deviations forward flexed posture; heel strike decreased  gazes @ floor  -DM     Comment (Gait/Stairs) cues for  incr step length & BRITANY,maintaining midline, trunk & neck ext/focusing ahead, PLB; Desat 90 % on 2L (recovered to 93%)  -DM           User Key  (r) = Recorded By, (t) = Taken By, (c) = Cosigned By    Initials Name Provider Type    Enid Sands, PT Physical Therapist               Obj/Interventions     Row Name 03/01/22 1217          Motor Skills    Therapeutic Exercise shoulder; hip; knee; ankle  per issued HEP  -DM     Row Name 03/01/22 1217          Shoulder (Therapeutic Exercise)    Shoulder AROM (Therapeutic Exercise) bilateral; flexion; extension; aBduction; aDduction; horizontal aBduction/aDduction; supine; 10 repetitions; other (see comments)  biceps curls  -DM     Row Name 03/01/22 1217          Hip (Therapeutic Exercise)    Hip (Therapeutic Exercise) AROM (active range of motion); AAROM (active assistive range of motion); isometric exercises  -DM     Hip AROM (Therapeutic Exercise) bilateral; flexion; extension; external rotation; internal rotation; sitting; supine; 10 repetitions  bridging x 2  -DM     Hip AAROM (Therapeutic Exercise) bilateral; aBduction; aDduction; supine; 10 repetitions  -DM     Hip Isometrics (Therapeutic Exercise) bilateral; gluteal sets; supine; 10 repetitions  -DM     Row Name 03/01/22 1217          Knee (Therapeutic Exercise)    Knee (Therapeutic Exercise) AROM (active range of motion); isometric exercises  -DM     Knee AROM (Therapeutic Exercise) bilateral; flexion; extension; SAQ (short arc quad); LAQ (long arc quad); SLR (straight leg raise); heel slides; supine; sitting; 10 repetitions  -DM     Knee Isometrics (Therapeutic Exercise) bilateral; hamstring sets; quad sets; supine; 10 repetitions  -DM     Row Name 03/01/22 1217          Ankle (Therapeutic Exercise)    Ankle (Therapeutic Exercise) AROM (active range of motion)  -DM     Ankle AROM (Therapeutic Exercise) bilateral; dorsiflexion; plantarflexion; supine; 10 repetitions; other (see comments)  AC  -DM     Row Name  03/01/22 1217          Balance    Balance Assessment sitting static balance; sitting dynamic balance; standing dynamic balance; standing static balance  -DM     Static Sitting Balance WNL; unsupported; sitting, edge of bed; other (see comments)  BSC  -DM     Dynamic Sitting Balance WNL; unsupported; other (see comments); sitting, edge of bed  toileting/hygiene  -DM     Static Standing Balance WFL; supported; standing  -DM     Dynamic Standing Balance WFL; supported; standing  -DM     Balance Interventions sitting; standing; static; dynamic; weight shifting activity  -DM           User Key  (r) = Recorded By, (t) = Taken By, (c) = Cosigned By    Initials Name Provider Type    DM Enid Guerra, PT Physical Therapist               Goals/Plan    No documentation.                Clinical Impression     Row Name 03/01/22 1217          Pain    Additional Documentation Pain Scale: Numbers Pre/Post-Treatment (Group)  -DM     Row Name 03/01/22 1217          Pain Scale: Numbers Pre/Post-Treatment    Pretreatment Pain Rating 0/10 - no pain  -DM     Posttreatment Pain Rating 0/10 - no pain  -DM     Row Name 03/01/22 1217          Plan of Care Review    Plan of Care Reviewed With patient  -DM     Progress improving  -DM     Outcome Summary Req supv.to SBA for bed mobil (min A to scoot to HOB), SPT to/from BSC w/ CGA, amb total of 36 ft w/ R wx for 2nd phase w/ CGA & 1 stand.rest, + performed bridging & ther exer per HEP sup & EOB.Noted desat to 90% on 2 L, HR to 126, & mod SOB. Plans d/c to SNF 3-2.  -DM     Row Name 03/01/22 1217          Vital Signs    Pre Systolic BP Rehab 131  -DM     Pre Treatment Diastolic BP 78  -DM     Post Systolic BP Rehab 135  -DM     Post Treatment Diastolic BP 89  -DM     Pretreatment Heart Rate (beats/min) 96  -DM     Intratreatment Heart Rate (beats/min) 126  -DM     Posttreatment Heart Rate (beats/min) 106  -DM     Pre SpO2 (%) 92  -DM     O2 Delivery Pre Treatment supplemental O2  -DM      Intra SpO2 (%) 92  -DM     O2 Delivery Intra Treatment supplemental O2  -DM     Post SpO2 (%) 93  -DM     O2 Delivery Post Treatment supplemental O2  -DM     Pre Patient Position Supine  -DM     Intra Patient Position Standing  -DM     Post Patient Position Supine  -DM     Row Name 03/01/22 1217          Positioning and Restraints    Pre-Treatment Position in bed  -DM     Post Treatment Position bed  -DM     In Bed notified nsg; supine; call light within reach; encouraged to call for assist; exit alarm on; heels elevated  -DM           User Key  (r) = Recorded By, (t) = Taken By, (c) = Cosigned By    Initials Name Provider Type    Enid Sands, PT Physical Therapist               Outcome Measures     Row Name 03/01/22 1217 03/01/22 0800       How much help from another person do you currently need...    Turning from your back to your side while in flat bed without using bedrails? 3  -DM 3  -MR    Moving from lying on back to sitting on the side of a flat bed without bedrails? 3  -DM 3  -MR    Moving to and from a bed to a chair (including a wheelchair)? 3  -DM 3  -MR    Standing up from a chair using your arms (e.g., wheelchair, bedside chair)? 3  -DM 3  -MR    Climbing 3-5 steps with a railing? 2  -DM 2  -MR    To walk in hospital room? 3  -DM 3  -MR    AM-PAC 6 Clicks Score (PT) 17  -DM 17  -MR    Row Name 03/01/22 1217          Functional Assessment    Outcome Measure Options AM-PAC 6 Clicks Basic Mobility (PT)  -DM           User Key  (r) = Recorded By, (t) = Taken By, (c) = Cosigned By    Initials Name Provider Type    Enid Sands, PT Physical Therapist    Ollie Calhoun, RN Registered Nurse                             Physical Therapy Education                 Title: PT OT SLP Therapies (In Progress)     Topic: Physical Therapy (Done)     Point: Mobility training (Done)     Learning Progress Summary           Patient BOLA Vallecillo,REBECCA,H, VU by RODY at 3/1/2022 1306    BOLA Vallecillo,REBECCA,H, NR by RODY at 2/25/2022  1320    Acceptance, E,D,H, NR by DM at 2/23/2022 1722    Eager, E, VU,DU by  at 2/22/2022 1509                   Point: Home exercise program (Done)     Learning Progress Summary           Patient Eager, E,D,H, VU by DM at 3/1/2022 1306    Eager, E,D,H, NR by DM at 2/25/2022 1320    Acceptance, E,D,H, NR by DM at 2/23/2022 1722    Eager, E, VU,DU by SJ at 2/22/2022 1509                   Point: Body mechanics (Done)     Learning Progress Summary           Patient Eager, E,D,H, VU by DM at 3/1/2022 1306    Eager, E,D,H, NR by DM at 2/25/2022 1320    Acceptance, E,D,H, NR by DM at 2/23/2022 1722    Eager, E, VU,DU by  at 2/22/2022 1509                   Point: Precautions (Done)     Learning Progress Summary           Patient Eager, E,D,H, VU by DM at 3/1/2022 1306    Eager, E,D,H, NR by DM at 2/25/2022 1320    Acceptance, E,D,H, NR by DM at 2/23/2022 1722    Eager, E, VU,DU by  at 2/22/2022 1509                               User Key     Initials Effective Dates Name Provider Type Discipline     06/16/21 -  Mary Hatfield, PT Physical Therapist PT     06/16/21 -  Enid Guerra PT Physical Therapist PT              PT Recommendation and Plan     Plan of Care Reviewed With: patient  Progress: improving  Outcome Summary: Req supv.to SBA for bed mobil (min A to scoot to HOB), SPT to/from BSC w/ CGA, amb total of 36 ft w/ R wx for 2nd phase w/ CGA & 1 stand.rest, + performed bridging & ther exer per HEP sup & EOB.Noted desat to 90% on 2 L, HR to 126, & mod SOB. Plans d/c to SNF 3-2.     Time Calculation:    PT Charges     Row Name 03/01/22 1306             Time Calculation    Start Time 1217  -DM      PT Received On 03/01/22  -DM      PT Goal Re-Cert Due Date 03/04/22  -DM              Time Calculation- PT    Total Timed Code Minutes- PT 31 minute(s)  -DM              Timed Charges    80346 - Gait Training Minutes  14  -DM      28687 - PT Therapeutic Activity Minutes 17  -DM              Total Minutes     Timed Charges Total Minutes 31  -DM       Total Minutes 31  -DM            User Key  (r) = Recorded By, (t) = Taken By, (c) = Cosigned By    Initials Name Provider Type    Enid Sands, PT Physical Therapist              Therapy Charges for Today     Code Description Service Date Service Provider Modifiers Qty    59060091714 HC GAIT TRAINING EA 15 MIN 3/1/2022 Enid Guerra, PT GP 1    50840166242 HC PT THERAPEUTIC ACT EA 15 MIN 3/1/2022 Enid Guerra, PT GP 1          PT G-Codes  Outcome Measure Options: AM-PAC 6 Clicks Basic Mobility (PT)  AM-PAC 6 Clicks Score (PT): 17  AM-PAC 6 Clicks Score (OT): 23    Enid Guerra, PT  3/1/2022

## 2022-03-01 NOTE — PLAN OF CARE
Goal Outcome Evaluation:  Plan of Care Reviewed With: patient        Progress: improving  Outcome Summary: Req supv.to SBA for bed mobil (min A to scoot to HOB), SPT to/from BSC w/ CGA, amb total of 36 ft w/ R wx for 2nd phase w/ CGA & 1 stand.rest, + performed bridging & ther exer per HEP sup & EOB.Noted desat to 90% on 2 L, HR to 126, & mod SOB. Plans d/c to SNF 3-2.

## 2022-03-01 NOTE — PLAN OF CARE
Goal Outcome Evaluation:  Patient has no voiced complaints this shift. Oxygen saturation above 90% on 2L NC. No complaints of SOA at this time.     Sinus rhythm per telemetry.

## 2022-03-01 NOTE — PROGRESS NOTES
NN spoke with pt at BS.  Pt alert and able to answer questions appropriately. Pt O2 sat  95% on  2 L currently, no home O2 use. COPD action plan reviewed. Deep breathing exercises encouraged. Patient continues to deny outpatient pulmonary services.  No new concerns or questions voiced at this time.  NN will continue to follow as needed.       Per current G[employer] Standards, please consider: No LAMA in place, Outpatient PFT, Rehab as appropriate

## 2022-03-01 NOTE — PROGRESS NOTES
"Clinical Nutrition   Reason For Visit: po f/u    Patient Name: Sierra Saba  YOB: 1940  MRN: [id number]  Date of Encounter: 22 12:03 EST  Admission date: 2022        Nutrition Assessment     Admission Problem List:    Essential hypertension    COPD with acute exacerbation (HCC)    Lung blebs (HCC)    Left renal mass    CAP (community acquired pneumonia)    GERD without esophagitis    KIM (generalized anxiety disorder)    Hyperglycemia    Pneumonia of right lower lobe due to infectious organism    Severe malnutrition (CMS/HCC)     Applicable PMH:    Applicable Nutrition-Related Information:    Applicable medical tests/procedures since admission:      PMH: She  has a past medical history of Hypertension.   PSxH: She  has a past surgical history that includes Back surgery; Hysterectomy; Total hip arthroplasty; Cholecystectomy; and Chest tube insertion (Left, 10/20/2021).        Reported/Observed/Food/Nutrition Related History   Per previous chartin/22)Pt sitting in bedside chair in NAD. She states she has lost weight and eating about 50% as much as usual over the past 6 months r/t loss appetite. She reports this has been gradual but especially over the past 3 weeks she has been very weak and tired affecting PO intakes. She reports she does try to prioritize energy/nutient dense foods. She states she has been recommended to drink ONS in the past but does not like them, open to trying magic cups but states she wont like it if \"it smells like vitamins.\" She states she would eat better on a regular diet. She denies further dietary needs/preferences, denies dysphagia. NKFA. RD notes incidental finding of renal mass, concern for malignancy with significant weight loss and loss appetite in the past 6 months. Pt has had stable weight for several years prior to this.     Anthropometrics   Per previous charting:    Height: 65 in  Weight: 114 lb  BMI: 19  BMI classification: Normal: 18.5-24.9kg/m2 "   IBW: 125 lb    UBW: 136 lb ~6 months ago per pt, only recent wts avail are stated, however all ~135 past 2 years  Weight change: loss 22 lb / 17% body weight past 6 months    Nutrition Focused Physical Exam 2/22     Pt meets criteria severe malnutrition 2/22    Labs reviewed   Labs reviewed: Yes    Results from last 7 days   Lab Units 02/26/22  0306 02/24/22  0414 02/23/22  0423   SODIUM mmol/L 140 141 144   POTASSIUM mmol/L 4.3 4.5 4.4   CHLORIDE mmol/L 106 107 109*   CO2 mmol/L 26.0 26.0 26.0   BUN mg/dL 20 15 15   CREATININE mg/dL 0.60 0.50* 0.71   GLUCOSE mg/dL 111* 98 93   CALCIUM mg/dL 8.8 9.0 8.6   MAGNESIUM mg/dL  --  2.0 2.5*     Results from last 7 days   Lab Units 02/26/22  0306 02/24/22  0414 02/23/22  0423   WBC 10*3/mm3 4.81 6.39 7.56     Results from last 7 days   Lab Units 03/01/22  1106 03/01/22  0718 02/28/22  1858 02/28/22  1607 02/28/22  1115 02/28/22  0649   GLUCOSE mg/dL 118 99 132* 105 105 107     Lab Results   Lab Value Date/Time    HGBA1C 5.60 02/21/2022 1945     Medications reviewed   Medications reviewed: Yes      Current Nutrition Prescription   PO: Diet Regular  Oral Nutrition Supplement:      Average PO intake: 75% @ past 3 meals      Nutrition Diagnosis   2/22  Problem Malnutrition    Etiology COPD(+ possible renal malignancy)   Signs/Symptoms  PO intakes <75% estimated needs and loss 22 lb / 17% body weight past 6 months, severe muscle wasting, and severe subcutaneous fat loss.   Ongoing 3/1  Goal:   General: Nutrition to support treatment  PO: Increase intake       Intervention   Intervention: Follow treatment progress, Care plan reviewed    Monitoring/Evaluation:   Monitoring/Evaluation: Per protocol, I&O, PO intake, Supplement intake, Pertinent labs, Weight, Skin status, Symptoms, POC/GOC    Naomi Kc, MS,RD,LD  Time Spent: 25 mins

## 2022-03-01 NOTE — PLAN OF CARE
Goal Outcome Evaluation:  Plan of Care Reviewed With: patient           Pt VSS on 2L O2. No c/o pain. Plan for the [place] tomorrow. Transportation scheduled @ 1530.

## 2022-03-01 NOTE — PROGRESS NOTES
[facility] Medicine Services  PROGRESS NOTE    Patient Name: Sierra Saba  : 1940  MRN: [id number]    Date of Admission: 2022  Primary Care Physician: Praveen Page MD    Subjective   Subjective     CC:   Hypoxia and shortness of breath    HPI:    Doing well.  No issues today.  Denies fevers or chills.  No shortness of breath reported.  No family in room today.    ROS:  Gen- No fevers, No chills  CV- No chest pain, palpitations  Resp- no dyspnea, no cough  GI- no abdominal pain, no nausea, no vomiting    Objective   Objective     Vital Signs:   Temp:  [97.4 °F (36.3 °C)-98.5 °F (36.9 °C)] 97.8 °F (36.6 °C)  Heart Rate:  [] 95  Resp:  [16-20] 20  BP: (120-147)/(64-81) 147/81  Flow (L/min):  [2] 2     Physical Exam:    Constitutional: denies fevers or chills, denies shortness of breath, in no acute distress  HENT: NCAT, no JVD  Respiratory: poor inspiratory capacity, inspiratory capacity of 35% of expected, no wheezes  Cardiovascular: RRR, s1 and s2  Gastrointestinal: Positive bowel sounds, soft, nontender, nondistended  Musculoskeletal: no pedal edema  Psychiatric: Appropriate affect, cooperative  Neurologic: Oriented x 3, strength symmetric in all extremities, Cranial Nerves grossly intact to confrontation, speech clear  Skin: dry skin      Results Reviewed:  LAB RESULTS:      Lab 22   WBC 4.81 6.39 7.56   HEMOGLOBIN 14.2 13.7 13.6   HEMATOCRIT 43.0 42.4 42.7   PLATELETS 182 184 187   NEUTROS ABS  --  4.94 3.99   IMMATURE GRANS (ABS)  --  0.03 0.02   LYMPHS ABS  --  1.00 2.27   MONOS ABS  --  0.39 0.62   EOS ABS  --  0.01 0.61*   MCV 92.5 93.4 94.9         Lab 22   SODIUM 140 141 144   POTASSIUM 4.3 4.5 4.4   CHLORIDE 106 107 109*   CO2 26.0 26.0 26.0   ANION GAP 8.0 8.0 9.0   BUN 20 15 15   CREATININE 0.60 0.50* 0.71   GLUCOSE 111* 98 93   CALCIUM 8.8 9.0 8.6   MAGNESIUM  --  2.0  2.5*                         Brief Urine Lab Results  (Last result in the past 365 days)      Color   Clarity   Blood   Leuk Est   Nitrite   Protein   CREAT   Urine HCG        02/23/22 1440 Yellow   Clear   Negative   Negative   Negative   Negative                 Microbiology Results Abnormal     Procedure Component Value - Date/Time    COVID-19, APTIMA PANTHER [facility] IN-HOUSE NP/OP SWAB IN UTM/VTM/[facility] TRANSPORT MEDIA 24HR TAT - Swab, Nasopharynx [[id number]]  (Normal) Collected: 03/01/22 1324    Lab Status: Final result Specimen: Swab from Nasopharynx Updated: 03/01/22 1810     COVID19 Not Detected    Narrative:      Fact sheet for providers: https://www.fda.gov/media/754953/download     Fact sheet for patients: https://www.fda.gov/media/856861/download    Test performed by RT PCR.    Blood Culture - Blood, Arm, Left [[id number]]  (Normal) Collected: 02/21/22 2231    Lab Status: Final result Specimen: Blood from Arm, Left Updated: 02/26/22 2315     Blood Culture No growth at 5 days    Blood Culture - Blood, Arm, Right [[id number]]  (Normal) Collected: 02/21/22 2231    Lab Status: Final result Specimen: Blood from Arm, Right Updated: 02/26/22 2315     Blood Culture No growth at 5 days    Respiratory Culture - Sputum, Cough [[id number]] Collected: 02/23/22 1801    Lab Status: Final result Specimen: Sputum from Cough Updated: 02/25/22 0949     Respiratory Culture Light growth (2+) Normal respiratory chi. No S. aureus or Pseudomonas aeruginosa detected. Final report.     Gram Stain Moderate (3+) WBCs per low power field      Few (2+) Epithelial cells per low power field      Rare (1+) Mixed bacterial morphotypes seen on Gram Stain    Legionella Antigen, Urine - Urine, Urine, Clean Catch [[id number]]  (Normal) Collected: 02/23/22 0444    Lab Status: Final result Specimen: Urine, Clean Catch Updated: 02/23/22 1029     LEGIONELLA ANTIGEN, URINE Negative    S. Pneumo Ag Urine or CSF - Urine, Urine, Clean Catch [[id number]]   (Normal) Collected: 02/23/22 0444    Lab Status: Final result Specimen: Urine, Clean Catch Updated: 02/23/22 1029     Strep Pneumo Ag Negative    MRSA Screen, PCR (Inpatient) - Swab, Nares [[id number]]  (Normal) Collected: 02/22/22 0226    Lab Status: Final result Specimen: Swab from Nares Updated: 02/22/22 0908     MRSA PCR Negative    Narrative:      MRSA Negative    Respiratory Panel PCR w/COVID-19(SARS-CoV-2) ESTELLA/SAMY/MADDY/PAD/COR/MAD/HARVEY In-House, NP Swab in UTM/VTM, 3-4 HR TAT - Swab, Nasopharynx [[id number]]  (Normal) Collected: 02/22/22 0226    Lab Status: Final result Specimen: Swab from Nasopharynx Updated: 02/22/22 0341     ADENOVIRUS, PCR Not Detected     Coronavirus 229E Not Detected     Coronavirus HKU1 Not Detected     Coronavirus NL63 Not Detected     Coronavirus OC43 Not Detected     COVID19 Not Detected     Human Metapneumovirus Not Detected     Human Rhinovirus/Enterovirus Not Detected     Influenza A PCR Not Detected     Influenza B PCR Not Detected     Parainfluenza Virus 1 Not Detected     Parainfluenza Virus 2 Not Detected     Parainfluenza Virus 3 Not Detected     Parainfluenza Virus 4 Not Detected     RSV, PCR Not Detected     Bordetella pertussis pcr Not Detected     Bordetella parapertussis PCR Not Detected     Chlamydophila pneumoniae PCR Not Detected     Mycoplasma pneumo by PCR Not Detected    Narrative:      In the setting of a positive respiratory panel with a viral infection PLUS a negative procalcitonin without other underlying concern for bacterial infection, consider observing off antibiotics or discontinuation of antibiotics and continue supportive care. If the respiratory panel is positive for atypical bacterial infection (Bordetella pertussis, Chlamydophila pneumoniae, or Mycoplasma pneumoniae), consider antibiotic de-escalation to target atypical bacterial infection.    COVID PRE-OP / PRE-PROCEDURE SCREENING ORDER (NO ISOLATION) - Swab, Nasopharynx [[id number]]  (Normal) Collected:  02/21/22 1945    Lab Status: Final result Specimen: Swab from Nasopharynx Updated: 02/21/22 2021    Narrative:      The following orders were created for panel order COVID PRE-OP / PRE-PROCEDURE SCREENING ORDER (NO ISOLATION) - Swab, Nasopharynx.  Procedure                               Abnormality         Status                     ---------                               -----------         ------                     COVID-19 and FLU A/B PCR...[[id number]]  Normal              Final result                 Please view results for these tests on the individual orders.    COVID-19 and FLU A/B PCR - Swab, Nasopharynx [[id number]]  (Normal) Collected: 02/21/22 1945    Lab Status: Final result Specimen: Swab from Nasopharynx Updated: 02/21/22 2021     COVID19 Not Detected     Influenza A PCR Not Detected     Influenza B PCR Not Detected    Narrative:      Fact sheet for providers: https://www.fda.gov/media/288684/download    Fact sheet for patients: https://www.fda.gov/media/969463/download    Test performed by PCR.          No radiology results from the last 24 hrs        I have reviewed the medications:  Scheduled Meds:amLODIPine, 5 mg, Oral, Q24H  budesonide-formoterol, 2 puff, Inhalation, BID - RT  famotidine, 10 mg, Oral, BID AC  heparin (porcine), 5,000 Units, Subcutaneous, Q8H  insulin lispro, 0-9 Units, Subcutaneous, TID AC  ipratropium-albuterol, 3 mL, Nebulization, Q6H While Awake - RT  levETIRAcetam, 500 mg, Oral, Q12H  [START ON 3/2/2022] methylPREDNISolone sodium succinate, 20 mg, Intravenous, Q12H  PARoxetine, 20 mg, Oral, Daily  sodium chloride, 10 mL, Intravenous, Q12H      Continuous Infusions:   PRN Meds:.•  acetaminophen  •  dextrose  •  dextrose  •  glucagon (human recombinant)  •  magnesium sulfate **OR** magnesium sulfate **OR** magnesium sulfate  •  melatonin  •  ondansetron **OR** ondansetron  •  potassium chloride **OR** potassium chloride **OR** potassium chloride  •  potassium chloride **OR**  potassium chloride **OR** [DISCONTINUED] potassium chloride  •  sodium chloride  •  [COMPLETED] Insert peripheral IV **AND** sodium chloride  •  sodium chloride    Assessment/Plan   Assessment & Plan     Active Hospital Problems    Diagnosis  POA   • Severe malnutrition (CMS/HCC) [E43]  Yes   • COPD with acute exacerbation (HCC) [J44.1]  Yes   • Lung blebs (HCC) [J43.9]  Yes   • Left renal mass [N28.89]  Yes   • CAP (community acquired pneumonia) [J18.9]  Yes   • GERD without esophagitis [K21.9]  Yes   • KIM (generalized anxiety disorder) [F41.1]  Yes   • Hyperglycemia [R73.9]  Yes   • Pneumonia of right lower lobe due to infectious organism [J18.9]  Yes   • Essential hypertension [I10]  Yes      Resolved Hospital Problems   No resolved problems to display.        Brief Hospital Course to date:  Sierra Saba is a 81 y.o. female with past medical history of anxiety COPD, hypertension, seizures, GERD, and history of spontaneous pneumothorax on 10/2021 secondary to bleb rupture who presents the ER with complaints of shortness of breath and cough.     Patient reports that she has been having overall decline in her respiratory status over the past 3 weeks.  She reports productive cough, general malaise, increased fatigue.  She denies any chest pain or pressure.  She has had difficulty with exertion due to her shortness of breath.  Called EMS for shortness of breath today and was found to have a oxygen saturation of 85% on room air.  Brought to the ER for further evaluation.  Denies any fever chills, however chest CT concerning for possible atelectasis versus pneumonia.        Community-acquired Pneumonia  COPD with Acute Exacerbation   Lung Blebs  Acute Respiratory failure with Hypoxia  - Patient currently stable and in no acute distress  - oxygen requirement mild improvement back to 2 LNC today  - CXR shows developing right lower lobe pneumonia.  - CTA Chest shows findings suggesting pneumonia of right lower lung.  Chronic COPD changes, as well as a small left pleural effusion. Negative for PE.  - cont Ceftriaxone + doxycycline  - wbc normal, lactate and procal normal.   - follow cultures  - strep pneumo and legionella Ag's negative  - cont symbicort BID.  Repeat CXR on 2/25 showed continued right basilar infiltrate and right sided pleural fluid and advance emphysema.  Not much improvement,  increased solumedrol to 60mg IV q8h day 2.    - encourage incentive spirometer exercises  - she is pulling 35% of expected volumes  - flutter valve  - change bronchodilator to every 6 hours while awake   - de-escalate IV Solu Medrol again today  - decrease Solu Medrol to 20 mg IV every 12 hours    Left Renal Mass  - CTA chest also revealed mass-like appearance at the anterior medial aspect of the lower pole of left kidney.   - This was not noted on scan on 10/2021. She reports never being told of this  - Needs f/u renal proctocol CT at discharge with urology followup     Hypokalemia  --replacement protocol     Hypertension  - She reports to be on a blood pressure medication, but she cannot recall what it is.  - has been hypotensive here/improved with IVF    Seizure Disorder  - Continue Keppra  - Seizure precautions     GERD  - Continue Pepcid     Generalized Anxiety disorder  - Continue Paxil     Hyperglycemia  - mild  - A1C 5.60  - No current dx of T2DM  - SSI with steroids    DVT prophylaxis:  Medical DVT prophylaxis orders are present. heparin subQ      AM-PAC 6 Clicks Score (PT): 17 (03/01/22 1217)    Disposition: I expect the patient to be discharged to and SNF tomorrow - [facility] tomorrow - COVID-19 pending -  [facility] transport at 15:30    CODE STATUS:   Code Status and Medical Interventions:   Ordered at: 02/22/22 0124     Code Status (Patient has no pulse and is not breathing):    CPR (Attempt to Resuscitate)     Medical Interventions (Patient has pulse or is breathing):    Full Support       Ty Lima  MD  03/01/22

## 2022-03-02 NOTE — DISCHARGE SUMMARY
[facility] Medicine Services  DISCHARGE SUMMARY    Patient Name: Sierra Saba  : 1940  MRN: [id number]    Date of Admission: 2022  7:19 PM  Date of Discharge:  3/2/2022 (Wednesday)  Primary Care Physician: Praveen Page MD    Consults     No orders found from 2022 to 2022.          Hospital Course     Presenting Problem:   COPD with acute exacerbation (HCC) [J44.1]  Pneumonia of right lower lobe due to infectious organism [J18.9]    Active Hospital Problems    Diagnosis  POA   • Severe malnutrition (CMS/HCC) [E43]  Yes   • COPD with acute exacerbation (HCC) [J44.1]  Yes   • Lung blebs (HCC) [J43.9]  Yes   • Left renal mass [N28.89]  Yes   • CAP (community acquired pneumonia) [J18.9]  Yes   • GERD without esophagitis [K21.9]  Yes   • KIM (generalized anxiety disorder) [F41.1]  Yes   • Hyperglycemia [R73.9]  Yes   • Pneumonia of right lower lobe due to infectious organism [J18.9]  Yes   • Essential hypertension [I10]  Yes      Resolved Hospital Problems   No resolved problems to display.            Sierra Saba is a 81 y.o. female with past medical history of anxiety COPD, hypertension, seizures, GERD, and history of spontaneous pneumothorax on 10/2021 secondary to bleb rupture who presents the ER with complaints of shortness of breath and cough.     Patient reports that she has been having overall decline in her respiratory status over the past 3 weeks.  She reports productive cough, general malaise, increased fatigue.  She denies any chest pain or pressure.  She has had difficulty with exertion due to her shortness of breath.  Called EMS for shortness of breath today and was found to have a oxygen saturation of 85% on room air.  Brought to the ER for further evaluation.  Denies any fever chills, however chest CT concerning for possible atelectasis versus pneumonia.     She has very weak inspiratory capacity estimated to be 35% of expected for her  age/height.     Possible Community-acquired Pneumonia  Possible COPD with Acute Exacerbation   Lung Blebs  Acute Respiratory failure with Hypoxia  - Patient currently stable and in no acute distress  - completed a course of abx  - has had 9 days of IV steroids  - will prescribe a 4 days course of oral steroids to complete 2 weeks of steroids  - encourage Incentive Spirometer Exercises     Left Renal Mass  - CTA chest also revealed mass-like appearance at the anterior medial aspect of the lower pole of left kidney.   - This was not noted on scan on 10/2021. She reports never being told of this  - Needs f/u renal proctocol CT at discharge with urology followup     Hypokalemia  --replacement protocol     Hypertension  - norvasc dose decreased from 10 mg to 5 mg     Seizure Disorder  - Continue Keppra  - Seizure precautions     GERD  - Continue Pepcid     Generalized Anxiety disorder  - Continue Paxil     Hyperglycemia  - mild  - A1C 5.60  - No current dx of T2DM  - SSI with steroids    Day of Discharge     HPI:   Sleepy.  Feels fine.  Denies shortness of breath.  Denies chest pain    Review of Systems    Gen- No fevers, chills  CV- No chest pain, palpitations  Resp- No cough, dyspnea  GI- No N/V/D, abd pain    Vital Signs:   Temp:  [97.8 °F (36.6 °C)-98.1 °F (36.7 °C)] 97.9 °F (36.6 °C)  Heart Rate:  [] 84  Resp:  [16-20] 16  BP: (113-147)/(66-85) 114/66  Flow (L/min):  [2] 2    Physical Exam:    Constitutional: No acute distress, sleepy  HENT: NCAT, dry tongue  Respiratory: Clear to auscultation bilaterally, respiratory effort normal   Cardiovascular: RRR, no murmurs, rubs, or gallops  Gastrointestinal: Positive bowel sounds, soft, nontender, nondistended  Musculoskeletal: No bilateral ankle edema  Skin: No rashes    Pertinent  and/or Most Recent Results     LAB RESULTS:      Lab 02/26/22  0306 02/24/22  0414   WBC 4.81 6.39   HEMOGLOBIN 14.2 13.7   HEMATOCRIT 43.0 42.4   PLATELETS 182 184   NEUTROS ABS  --   4.94   IMMATURE GRANS (ABS)  --  0.03   LYMPHS ABS  --  1.00   MONOS ABS  --  0.39   EOS ABS  --  0.01   MCV 92.5 93.4         Lab 02/26/22  0306 02/24/22  0414   SODIUM 140 141   POTASSIUM 4.3 4.5   CHLORIDE 106 107   CO2 26.0 26.0   ANION GAP 8.0 8.0   BUN 20 15   CREATININE 0.60 0.50*   GLUCOSE 111* 98   CALCIUM 8.8 9.0   MAGNESIUM  --  2.0                         Brief Urine Lab Results  (Last result in the past 365 days)      Color   Clarity   Blood   Leuk Est   Nitrite   Protein   CREAT   Urine HCG        02/23/22 1440 Yellow   Clear   Negative   Negative   Negative   Negative               Microbiology Results (last 10 days)     Procedure Component Value - Date/Time    COVID-19, APTIMA PANTHER SAMY IN-HOUSE NP/OP SWAB IN UTM/VTM/SALINE TRANSPORT MEDIA 24HR TAT - Swab, Nasopharynx [[id number]]  (Normal) Collected: 03/01/22 1324    Lab Status: Final result Specimen: Swab from Nasopharynx Updated: 03/01/22 1810     COVID19 Not Detected    Narrative:      Fact sheet for providers: https://www.fda.gov/media/778604/download     Fact sheet for patients: https://www.fda.gov/media/286395/download    Test performed by RT PCR.    Respiratory Culture - Sputum, Cough [[id number]] Collected: 02/23/22 1801    Lab Status: Final result Specimen: Sputum from Cough Updated: 02/25/22 0949     Respiratory Culture Light growth (2+) Normal respiratory chi. No S. aureus or Pseudomonas aeruginosa detected. Final report.     Gram Stain Moderate (3+) WBCs per low power field      Few (2+) Epithelial cells per low power field      Rare (1+) Mixed bacterial morphotypes seen on Gram Stain    S. Pneumo Ag Urine or CSF - Urine, Urine, Clean Catch [[id number]]  (Normal) Collected: 02/23/22 0444    Lab Status: Final result Specimen: Urine, Clean Catch Updated: 02/23/22 1029     Strep Pneumo Ag Negative    Legionella Antigen, Urine - Urine, Urine, Clean Catch [[id number]]  (Normal) Collected: 02/23/22 0444    Lab Status: Final result Specimen:  Urine, Clean Catch Updated: 02/23/22 1029     LEGIONELLA ANTIGEN, URINE Negative    MRSA Screen, PCR (Inpatient) - Swab, Nares [[id number]]  (Normal) Collected: 02/22/22 0226    Lab Status: Final result Specimen: Swab from Nares Updated: 02/22/22 0908     MRSA PCR Negative    Narrative:      MRSA Negative    Respiratory Panel PCR w/COVID-19(SARS-CoV-2) ESTELLA/SAMY/MADDY/PAD/COR/MAD/HARVEY In-House, NP Swab in UTM/VTM, 3-4 HR TAT - Swab, Nasopharynx [[id number]]  (Normal) Collected: 02/22/22 0226    Lab Status: Final result Specimen: Swab from Nasopharynx Updated: 02/22/22 0341     ADENOVIRUS, PCR Not Detected     Coronavirus 229E Not Detected     Coronavirus HKU1 Not Detected     Coronavirus NL63 Not Detected     Coronavirus OC43 Not Detected     COVID19 Not Detected     Human Metapneumovirus Not Detected     Human Rhinovirus/Enterovirus Not Detected     Influenza A PCR Not Detected     Influenza B PCR Not Detected     Parainfluenza Virus 1 Not Detected     Parainfluenza Virus 2 Not Detected     Parainfluenza Virus 3 Not Detected     Parainfluenza Virus 4 Not Detected     RSV, PCR Not Detected     Bordetella pertussis pcr Not Detected     Bordetella parapertussis PCR Not Detected     Chlamydophila pneumoniae PCR Not Detected     Mycoplasma pneumo by PCR Not Detected    Narrative:      In the setting of a positive respiratory panel with a viral infection PLUS a negative procalcitonin without other underlying concern for bacterial infection, consider observing off antibiotics or discontinuation of antibiotics and continue supportive care. If the respiratory panel is positive for atypical bacterial infection (Bordetella pertussis, Chlamydophila pneumoniae, or Mycoplasma pneumoniae), consider antibiotic de-escalation to target atypical bacterial infection.    Blood Culture - Blood, Arm, Left [[id number]]  (Normal) Collected: 02/21/22 2231    Lab Status: Final result Specimen: Blood from Arm, Left Updated: 02/26/22 2315     Blood  Culture No growth at 5 days    Blood Culture - Blood, Arm, Right [[id number]]  (Normal) Collected: 02/21/22 2231    Lab Status: Final result Specimen: Blood from Arm, Right Updated: 02/26/22 2315     Blood Culture No growth at 5 days    COVID PRE-OP / PRE-PROCEDURE SCREENING ORDER (NO ISOLATION) - Swab, Nasopharynx [[id number]]  (Normal) Collected: 02/21/22 1945    Lab Status: Final result Specimen: Swab from Nasopharynx Updated: 02/21/22 2021    Narrative:      The following orders were created for panel order COVID PRE-OP / PRE-PROCEDURE SCREENING ORDER (NO ISOLATION) - Swab, Nasopharynx.  Procedure                               Abnormality         Status                     ---------                               -----------         ------                     COVID-19 and FLU A/B PCR...[[id number]]  Normal              Final result                 Please view results for these tests on the individual orders.    COVID-19 and FLU A/B PCR - Swab, Nasopharynx [[id number]]  (Normal) Collected: 02/21/22 1945    Lab Status: Final result Specimen: Swab from Nasopharynx Updated: 02/21/22 2021     COVID19 Not Detected     Influenza A PCR Not Detected     Influenza B PCR Not Detected    Narrative:      Fact sheet for providers: https://www.fda.gov/media/867927/download    Fact sheet for patients: https://www.fda.gov/media/083841/download    Test performed by PCR.          XR Chest 1 View    Result Date: 2/25/2022  DATE OF EXAM: 2/25/2022 3:12 PM  PROCEDURE: XR CHEST 1 VW-  INDICATIONS: increasing hypoxia; J18.9-Pneumonia, unspecified organism; J96.01-Acute respiratory failure with hypoxia; J96.02-Acute respiratory failure with hypercapnia; J44.1-Chronic obstructive pulmonary disease with (acute) exacerbation; R05.9-Cough, unspecified; R07.89-Other chest pain; Z87.09-Personal history of other diseases of the respiratory system; J43.9-Emphysema, unspecified; R53.1-Weakness; Z87.8  COMPARISON: 2/21/2022  TECHNIQUE: Single  radiographic AP view of the chest was obtained.  FINDINGS: There is advanced emphysematous lung disease present. The left lung remains clear. There is a skinfold overlying the left chest. On the right, there is persistent infiltrate at the base with a dependent right-sided pleural effusion. Heart size is normal. There is dense atherosclerotic calcification of the aorta.       1. Continued right basilar infiltrate and right-sided pleural fluid. Allowing for differences in technique and positioning, this is not changed significantly. 2. Advanced emphysematous lung disease.  This report was finalized on 2/25/2022 3:30 PM by Xavier Wasserman MD.      XR Chest 1 View    Result Date: 2/21/2022   DATE OF EXAM: 2/21/2022 7:35 PM  PROCEDURE: XR CHEST 1 VW-  INDICATIONS: SOA triage protocol  COMPARISON: 11/17/2021  TECHNIQUE: [Portable chest radiograph]  FINDINGS: There is evidence for worsening abnormal density in the right lower lobe suggesting developing infiltrate. There is evidence for small right pleural effusion. The cardiac silhouette is within normal limits for size. The mediastinum is unremarkable. No acute osseous abnormalities are seen.      Evidence for developing right lower lobe pneumonia. Recommend correlation for signs or symptoms of acute infection and follow-up to ensure resolution.  This report was finalized on 2/21/2022 8:00 PM by Bola King MD.      CT Angiogram Chest    Result Date: 2/21/2022   DATE OF EXAM: 2/21/2022 10:36 PM  PROCEDURE: CT ANGIOGRAM CHEST-  INDICATIONS: SOA, hypoxia, tachy, r/o pneumonia or PE  COMPARISON: 10/21/2021  TECHNIQUE: Contiguous axial imaging was obtained from the thoracic inlet through the upper abdomen following the intravenous administration of 100 mL Isovue 370. Reconstructed coronal and sagittal images were also obtained. In addition, a 3 D volume rendered image was obtained after post processing. Automated exposure control and iterative reconstruction methods were  used.  FINDINGS: No significant focal filling defects are identified to indicate the presence of pulmonary embolism.  Severe changes of emphysema are again noted with areas of scarring. There is superimposed atelectasis versus infiltrate in the right lower lobe. A small right pleural effusion is seen. There is a subpleural bleb within the right lower lobe with associated scarring.  No significant hilar, mediastinal, or axillary lymphadenopathy is observed. A normal aortic arch branching pattern is identified. Severe atherosclerosis again noted. Mild to moderate coronary calcifications are observed. The thyroid gland is unremarkable. The esophagus is unremarkable.  The limited evaluation of the upper abdomen demonstrates no evidence for acute abnormality. There is a masslike appearance at the anterior medial aspect of the lower pole of the left kidney.  No acute osseous abnormalities are observed.      1.  No evidence for pulmonary embolism. 2.  Severe changes of emphysema are again noted. There is evidence for worsening scarring with development of a subpleural bleb in the inferior margin of the right lower lobe. There may be upward traction in this region related to these changes. Recommend follow-up CT of the chest in 3 months to assess for stability of these changes. 3.  Evidence for small left pleural effusion and superimposed atelectasis versus infiltrates in the right lower lobe and inferior right middle lobe. The findings suggest developing right lower lung pneumonia. Recommend correlation for clinical signs of infection. 4.  Severe atherosclerosis is noted. Mild to moderate coronary artery calcifications are also observed. 5.  Masslike appearance at the anterior medial aspect of the lower pole of left kidney. Recommend follow-up nonemergent outpatient CT of the abdomen and pelvis with multi phase contrast renal protocol for better evaluation.  This report was finalized on 2/21/2022 10:57 PM by Bola King  MD.                    Discharge Details        Discharge Medications      New Medications      Instructions Start Date   predniSONE 20 MG tablet  Commonly known as: DELTASONE   20 mg, Oral, Daily         Changes to Medications      Instructions Start Date   amLODIPine 5 MG tablet  Commonly known as: NORVASC  What changed:   · medication strength  · how much to take  · when to take this   5 mg, Oral, Every 24 Hours Scheduled   Start Date: March 3, 2022     sennosides-docusate 8.6-50 MG per tablet  Commonly known as: PERICOLACE  What changed:   · when to take this  · reasons to take this   2 tablets, Oral, 2 Times Daily         Continue These Medications      Instructions Start Date   acetaminophen 325 MG tablet  Commonly known as: TYLENOL   650 mg, Oral, Every 4 Hours PRN      Anoro Ellipta 62.5-25 MCG/INH aerosol powder  inhaler  Generic drug: umeclidinium-vilanterol   1 puff, Inhalation, Daily - RT      famotidine 10 MG tablet  Commonly known as: PEPCID   10 mg, Oral, 2 Times Daily      ipratropium-albuterol 0.5-2.5 mg/3 ml nebulizer  Commonly known as: DUO-NEB   3 mL, Nebulization, Every 4 Hours PRN      Keppra 500 MG tablet  Generic drug: levETIRAcetam   500 mg, Oral, Every 12 Hours Scheduled      Paxil 20 MG tablet  Generic drug: PARoxetine   20 mg, Oral, Daily         Stop These Medications    bisacodyl 5 MG EC tablet  Commonly known as: DULCOLAX     diazePAM 5 MG tablet  Commonly known as: Valium            Allergies   Allergen Reactions   • Ciprofloxacin Other (See Comments)     PT STATES IT MAKES HER CRAZY   • Zithromax [Azithromycin] Other (See Comments)     PT STATES IT MAKES HER CRAZY    • Penicillins Rash     Discharge Disposition:  Skilled Nursing Facility ([place] - External)  - [facility] [place]    Diet:  Hospital:  Diet Order   Procedures   • Diet Regular     Activity:  As tolerated    Restrictions or Other Recommendations:   Continue to encourage incentive spirometer exercises and ambulation /  activity.  She appears to have weak respiratory muscles and has weak inspiratory capacity.       CODE STATUS:    Code Status and Medical Interventions:   Ordered at: 02/22/22 0124     Code Status (Patient has no pulse and is not breathing):    CPR (Attempt to Resuscitate)     Medical Interventions (Patient has pulse or is breathing):    Full Support       No future appointments.    Ambulatory Referral to Outpatient Urology for Left Renal Mass seen Incidentally    we will give her another 4 days of steroids, however I question the benefit.  She has had 9-10 days of IV steroids here, this will give her 2 weeks of steroid therapy.    She has been tested 3 times for COVID-19.  Yesterday she tested negative, the two times she tested earlier in the hospitalization she was also found to be negative.      Ty Lima MD  03/02/22      Time Spent on Discharge:  I spent  41  minutes on this discharge activity which included: face-to-face encounter with the patient, reviewing the data in the system, coordination of the care with the nursing staff as well as consultants, documentation, and entering orders.

## 2022-03-02 NOTE — PLAN OF CARE
Goal Outcome Evaluation:  Patient has no voiced complaints this shift. No c/o SOA, pt currently on 2L NC.    Patient hopeful to discharge to rehab today.    Sinus rhythm per telemetry.

## 2022-03-02 NOTE — PLAN OF CARE
Goal Outcome Evaluation:  Plan of Care Reviewed With: patient        Progress: improving       VSS, O2 on 2L NC, NSR per monitor. No c/o pain or SOB. Pt to be d/c this afternoon per [facility].       Problem: Adult Inpatient Plan of Care  Goal: Absence of Hospital-Acquired Illness or Injury  Outcome: Met  Intervention: Identify and Manage Fall Risk  Recent Flowsheet Documentation  Taken 3/2/2022 1200 by Pia Samuels RN  Safety Promotion/Fall Prevention:   activity supervised   assistive device/personal items within reach   clutter free environment maintained   fall prevention program maintained   lighting adjusted   nonskid shoes/slippers when out of bed   room organization consistent   safety round/check completed   toileting scheduled  Taken 3/2/2022 1000 by Pia Samuels RN  Safety Promotion/Fall Prevention:   activity supervised   assistive device/personal items within reach   clutter free environment maintained   fall prevention program maintained   lighting adjusted   nonskid shoes/slippers when out of bed   room organization consistent   safety round/check completed   toileting scheduled  Taken 3/2/2022 0834 by Pia Samuels RN  Safety Promotion/Fall Prevention:   activity supervised   assistive device/personal items within reach   clutter free environment maintained   fall prevention program maintained   lighting adjusted   nonskid shoes/slippers when out of bed   room organization consistent   safety round/check completed   toileting scheduled  Intervention: Prevent Skin Injury  Recent Flowsheet Documentation  Taken 3/2/2022 1200 by Pia Samuels RN  Body Position: position changed independently  Taken 3/2/2022 1000 by Pia Samuels RN  Body Position: position changed independently  Taken 3/2/2022 0834 by Pia Samuels RN  Body Position: position changed independently  Skin Protection:   adhesive use limited   incontinence pads utilized   tubing/devices free from skin  contact  Intervention: Prevent Infection  Recent Flowsheet Documentation  Taken 3/2/2022 1200 by Pia Samuels, RN  Infection Prevention:   personal protective equipment utilized   rest/sleep promoted   single patient room provided   visitors restricted/screened  Taken 3/2/2022 1000 by Pia Samuels, RN  Infection Prevention:   personal protective equipment utilized   rest/sleep promoted   single patient room provided   visitors restricted/screened  Taken 3/2/2022 0834 by Pia Samuels, RN  Infection Prevention:   personal protective equipment utilized   rest/sleep promoted   single patient room provided   visitors restricted/screened

## 2022-03-02 NOTE — PROGRESS NOTES
NN spoke with pt at BS.  Pt alert and able to answer questions appropriately. Pt O2 sat  96% on  2 L currently, no home O2 use. COPD action plan reviewed. Deep breathing exercises encouraged. Patient expected to discharge today to rehab.  No new concerns or questions voiced at this time.  NN will continue to follow as needed.         Per current GOLD Standards, please consider: No LAMA in place, Outpatient PFT, Rehab as appropriate

## 2022-03-02 NOTE — CASE MANAGEMENT/SOCIAL WORK
Case Management Discharge Note      Final Note: Per Katya at [facility], they have a skilled bed for the pt today. Please call report to [phone] and send the copied chart and DC summary with the pt. Any controlled meds can be e scribed to the SNF pharmacy placed in [facility]. The pt is in agreement and will notify her family. [facility] van to transport with 02 at 1530. The pt must be at the [address] by 1515.   I gave Katya the  PCP number [phone] to provide the SNF SW with to arrange a  PCP after DC from the SNF. The facility MD will see the pt while in-house. It is the pts preference to wait until after DC from the SNF to arrange a PCP in the [facility] area. The pt has a list.         Selected Continued Care - Admitted Since 2/21/2022     Destination Coordination complete.    Service Provider Selected Services Address Phone Fax Patient Preferred    [facility] [place]  Skilled Nursing [address] [place] [address] [phone] --          Durable Medical Equipment    No services have been selected for the patient.              Dialysis/Infusion    No services have been selected for the patient.              Home Medical Care    No services have been selected for the patient.              Therapy    No services have been selected for the patient.              Community Resources    No services have been selected for the patient.              Community & DME    No services have been selected for the patient.                       Final Discharge Disposition Code: 03 - skilled nursing facility (SNF)

## 2022-05-11 NOTE — HOME HEALTH
SOC Note:    Home Health ordered for: disciplines SN/PT/OT    Reason for Hosp/Primary Dx/Co-morbidities: COPD    Focus of Care: COPD, weakness    Current Functional status/mobility/DME: walker, grab bars, oxygen concentrator    HB status/Living Arrangements: Lives alone, sister is staying with her until she gets better    Skin Integrity/wound status: None    Code Status: Full    Fall Risk: High fall risk    POC confirmed with patient on 5/9/22

## 2022-05-12 PROBLEM — J96.01 ACUTE HYPOXEMIC RESPIRATORY FAILURE (HCC): Status: ACTIVE | Noted: 2022-01-01

## 2022-05-12 PROBLEM — J96.22 ACUTE ON CHRONIC RESPIRATORY FAILURE WITH HYPOXIA AND HYPERCAPNIA (HCC): Status: ACTIVE | Noted: 2022-01-01

## 2022-05-12 PROBLEM — J96.21 ACUTE ON CHRONIC RESPIRATORY FAILURE WITH HYPOXIA AND HYPERCAPNIA (HCC): Status: ACTIVE | Noted: 2022-01-01

## 2022-05-12 PROBLEM — R79.89 ELEVATED PROCALCITONIN: Status: ACTIVE | Noted: 2022-01-01

## 2022-05-26 NOTE — TELEPHONE ENCOUNTER
Pt originally had an appt scheduled on 5/20 with Dr. Lomeli but it was canceled due to the provider being in surgery. Spoke with patients son, Judd, to reschedule. Pt is currently at [facility] and he is not sure if they can transport her here so he has decided to hold off on scheduling until the later part of June. Will contact patient to reschedule at a later date.

## 2022-07-06 NOTE — DISCHARGE INSTRUCTIONS
I recommend a recheck with lab work for hypokalemia, in 24 hours.  Potassium and magnesium were replaced in the emergency room.  If there is persisting hypokalemia, consideration of medication management and close follow-up with this.  There is a right upper lobe infiltrate on the chest x-ray, this was present in May on the prior CT scan, continue to follow this up with primary team.

## 2022-07-06 NOTE — ED PROVIDER NOTES
EMERGENCY DEPARTMENT ENCOUNTER    Pt Name: Sierra Saba  MRN: [id number]  Pt :   1940  Room Number:  1414  Date of encounter:  2022  PCP: Belinda King DO  ED Provider: Josiah Henriquez MD    Historian: Patient, EMS      HPI:  Chief Complaint: Hypokalemia        Context: Sierra Saba is a 81 y.o. female who presents to the ED c/o concerns over a lab draw that showed a low potassium.  She is currently being cared for at the [facility] for COPD, she had routine labs that showed a low potassium on .  Patient says she does not have any symptoms, has no weakness, no vomiting, no diarrhea recently.  She has baseline COPD and uses oxygen, she says she is having about the same symptoms that she usually does every day with this.  Request I give her potassium here and send her back to [facility].      PAST MEDICAL HISTORY  Past Medical History:   Diagnosis Date   • Anxiety    • COPD (chronic obstructive pulmonary disease) (Formerly [facility])    • Hypertension    • Seizures (HCC)          PAST SURGICAL HISTORY  Past Surgical History:   Procedure Laterality Date   • BACK SURGERY     • CHEST TUBE INSERTION Left 10/20/2021   • CHOLECYSTECTOMY     • HYSTERECTOMY     • TOTAL HIP ARTHROPLASTY           FAMILY HISTORY  Family History   Problem Relation Age of Onset   • Hypertension Mother    • Diabetes Father          SOCIAL HISTORY  Social History     Socioeconomic History   • Marital status:    Tobacco Use   • Smoking status: Former Smoker     Years: 41.25     Types: Cigarettes     Quit date:      Years since quitting: 10.5   • Smokeless tobacco: Never Used   Substance and Sexual Activity   • Alcohol use: Not Currently   • Drug use: Never   • Sexual activity: Not Currently         ALLERGIES  Adhesive tape, Ciprofloxacin, Zithromax [azithromycin], Sulfa antibiotics, Temazepam, and Penicillins        REVIEW OF SYSTEMS  Review of Systems   Constitutional: Negative.    Respiratory: Positive for  shortness of breath. Negative for cough.    Cardiovascular: Negative.    Gastrointestinal: Negative.           All systems reviewed and negative except for those discussed in HPI.       PHYSICAL EXAM    I have reviewed the triage vital signs and nursing notes.    ED Triage Vitals [07/05/22 2346]   Temp Heart Rate Resp BP SpO2   98.6 °F (37 °C) 105 20 153/89 95 %      Temp src Heart Rate Source Patient Position BP Location FiO2 (%)   Tympanic -- Lying Right arm --       Physical Exam  Constitutional:       Appearance: Normal appearance.   HENT:      Head: Normocephalic.      Nose: Nose normal.      Mouth/Throat:      Mouth: Mucous membranes are moist.   Eyes:      Pupils: Pupils are equal, round, and reactive to light.   Cardiovascular:      Rate and Rhythm: Normal rate and regular rhythm.   Pulmonary:      Effort: Pulmonary effort is normal. No accessory muscle usage or prolonged expiration.      Breath sounds: Examination of the right-upper field reveals wheezing. Examination of the left-upper field reveals wheezing. Decreased breath sounds and wheezing present. No rhonchi or rales.   Abdominal:      General: Abdomen is flat.      Palpations: Abdomen is soft.   Musculoskeletal:      Cervical back: Normal range of motion.   Skin:     General: Skin is warm and dry.   Neurological:      General: No focal deficit present.      Mental Status: She is alert and oriented to person, place, and time.   Psychiatric:         Mood and Affect: Mood normal.         Behavior: Behavior normal.         LAB RESULTS  Recent Results (from the past 24 hour(s))   CBC (No Diff)    Collection Time: 07/05/22  6:36 PM    Specimen: Blood   Result Value Ref Range    WBC 10.23 3.40 - 10.80 10*3/mm3    RBC 4.36 3.77 - 5.28 10*6/mm3    Hemoglobin 13.5 12.0 - 15.9 g/dL    Hematocrit 40.1 34.0 - 46.6 %    MCV 92.0 79.0 - 97.0 fL    MCH 31.0 26.6 - 33.0 pg    MCHC 33.7 31.5 - 35.7 g/dL    RDW 15.2 12.3 - 15.4 %    RDW-SD 51.1 37.0 - 54.0 fl    MPV  11.9 6.0 - 12.0 fL    Platelets 73 (L) 140 - 450 10*3/mm3   Comprehensive Metabolic Panel    Collection Time: 07/05/22  6:36 PM    Specimen: Blood   Result Value Ref Range    Glucose 131 (H) 65 - 99 mg/dL    BUN 21 8 - 23 mg/dL    Creatinine 0.70 0.57 - 1.00 mg/dL    Sodium 141 136 - 145 mmol/L    Potassium 2.5 (C) 3.5 - 5.2 mmol/L    Chloride 88 (L) 98 - 107 mmol/L    CO2 40.0 (H) 22.0 - 29.0 mmol/L    Calcium 8.8 8.6 - 10.5 mg/dL    Total Protein 5.6 (L) 6.0 - 8.5 g/dL    Albumin 3.50 3.50 - 5.20 g/dL    ALT (SGPT) 225 (H) 1 - 33 U/L    AST (SGOT) 94 (H) 1 - 32 U/L    Alkaline Phosphatase 103 39 - 117 U/L    Total Bilirubin 1.1 0.0 - 1.2 mg/dL    Globulin 2.1 gm/dL    A/G Ratio 1.7 g/dL    BUN/Creatinine Ratio 30.0 (H) 7.0 - 25.0    Anion Gap 13.0 5.0 - 15.0 mmol/L    eGFR 87.6 >60.0 mL/min/1.73   Green Top (Gel)    Collection Time: 07/06/22  1:13 AM   Result Value Ref Range    Extra Tube Hold for add-ons.    Lavender Top    Collection Time: 07/06/22  1:13 AM   Result Value Ref Range    Extra Tube hold for add-on    Gold Top - SST    Collection Time: 07/06/22  1:13 AM   Result Value Ref Range    Extra Tube Hold for add-ons.    Light Blue Top    Collection Time: 07/06/22  1:13 AM   Result Value Ref Range    Extra Tube Hold for add-ons.    Comprehensive Metabolic Panel    Collection Time: 07/06/22  1:13 AM    Specimen: Blood   Result Value Ref Range    Glucose 215 (H) 65 - 99 mg/dL    BUN 23 8 - 23 mg/dL    Creatinine 0.63 0.57 - 1.00 mg/dL    Sodium 141 136 - 145 mmol/L    Potassium 2.4 (C) 3.5 - 5.2 mmol/L    Chloride 89 (L) 98 - 107 mmol/L    CO2 42.0 (H) 22.0 - 29.0 mmol/L    Calcium 8.7 8.6 - 10.5 mg/dL    Total Protein 5.4 (L) 6.0 - 8.5 g/dL    Albumin 3.40 (L) 3.50 - 5.20 g/dL    ALT (SGPT) 213 (H) 1 - 33 U/L    AST (SGOT) 84 (H) 1 - 32 U/L    Alkaline Phosphatase 102 39 - 117 U/L    Total Bilirubin 1.0 0.0 - 1.2 mg/dL    Globulin 2.0 gm/dL    A/G Ratio 1.7 g/dL    BUN/Creatinine Ratio 36.5 (H) 7.0 - 25.0     Anion Gap 10.0 5.0 - 15.0 mmol/L    eGFR 89.8 >60.0 mL/min/1.73   Magnesium    Collection Time: 07/06/22  1:13 AM    Specimen: Blood   Result Value Ref Range    Magnesium 2.0 1.6 - 2.4 mg/dL   CBC Auto Differential    Collection Time: 07/06/22  1:13 AM    Specimen: Blood   Result Value Ref Range    WBC 10.28 3.40 - 10.80 10*3/mm3    RBC 4.31 3.77 - 5.28 10*6/mm3    Hemoglobin 13.4 12.0 - 15.9 g/dL    Hematocrit 39.6 34.0 - 46.6 %    MCV 91.9 79.0 - 97.0 fL    MCH 31.1 26.6 - 33.0 pg    MCHC 33.8 31.5 - 35.7 g/dL    RDW 15.0 12.3 - 15.4 %    RDW-SD 50.7 37.0 - 54.0 fl    MPV 10.2 6.0 - 12.0 fL    Platelets 72 (L) 140 - 450 10*3/mm3   Manual Differential    Collection Time: 07/06/22  1:13 AM    Specimen: Blood   Result Value Ref Range    Neutrophil % 64.0 42.7 - 76.0 %    Lymphocyte % 20.0 19.6 - 45.3 %    Monocyte % 4.0 (L) 5.0 - 12.0 %    Eosinophil % 0.0 (L) 0.3 - 6.2 %    Basophil % 0.0 0.0 - 1.5 %    Bands %  11.0 (H) 0.0 - 5.0 %    Metamyelocyte % 1.0 (H) 0.0 - 0.0 %    Neutrophils Absolute 7.71 (H) 1.70 - 7.00 10*3/mm3    Lymphocytes Absolute 2.06 0.70 - 3.10 10*3/mm3    Monocytes Absolute 0.41 0.10 - 0.90 10*3/mm3    Eosinophils Absolute 0.00 0.00 - 0.40 10*3/mm3    Basophils Absolute 0.00 0.00 - 0.20 10*3/mm3    RBC Morphology Normal Normal    WBC Morphology Normal Normal    Platelet Morphology Normal Normal       If labs were ordered, I independently reviewed the results.        RADIOLOGY  XR Chest 1 View    Result Date: 7/6/2022  EXAM:  XR CHEST 1 VW   DATE: 7/6/2022 1:08 AM HISTORY:   soa    chest pain COMPARISON:  5/12/2022. FINDINGS:  Increased right upper lung wedge-shaped opacity. Small/moderate right effusion. Mildly coarse basilar opacities. Coarsened parenchyma Heart size is normal. No acute bony findings.     1.  Right upper lung opacity, consider pneumonia. 2.  Small/moderate right pleural effusion 3.  Mildly coarse basilar opacities Electronically signed by:  Gabby Gonzalez M.D.  7/5/2022 11:51  PM Mountain Time      I ordered and reviewed the above noted radiographic studies.      I viewed images of chest x-ray which showed right upper lobe infiltrate per my independent interpretation.  I did compare to the prior imaging done in May, this is similar or matches a right anterior or upper lobe infiltrate on the CT done then.    See radiologist's dictation for official interpretation.        PROCEDURES    Procedures    No orders to display       MEDICATIONS GIVEN IN ER    Medications   sodium chloride 0.9 % flush 10 mL (has no administration in time range)   potassium chloride 10 mEq in 100 mL IVPB (10 mEq Intravenous New Bag 7/6/22 0154)   magnesium sulfate in D5W 1g/100mL (PREMIX) (1 g Intravenous New Bag 7/6/22 0228)   potassium chloride (MICRO-K) CR capsule 40 mEq (40 mEq Oral Given 7/6/22 0225)         PROGRESS, DATA ANALYSIS, CONSULTS, AND MEDICAL DECISION MAKING    All labs have been independently reviewed by me.  All radiology studies have been reviewed by me and the radiologist dictating the report.   EKG's have been independently viewed and interpreted by me.      Differential diagnoses: COPD pneumonia dehydration electrolyte depletion  ED Course as of 07/06/22 0246   Wed Jul 06, 2022 0245 I discussed with Mr. Saba the right upper lobe infiltrate, on clinical evaluation in comparison to prior imaging, it does not appear there is an acute infectious pneumonia at this time to indicate a clinical benefit for emergent antibiotic coverage. [TA]      ED Course User Index  [TA] Josiah Henriquez MD       Potassium and magnesium were replaced in the emergency room, I rechecked and reevaluated with Ms. Saba, she says she feels well and is requesting discharge again.  Her oxygen saturation at 2:44 AM is 98% on 2 L.      AS OF 02:46 EDT VITALS:    BP - 155/78  HR - 100  TEMP - 98.6 °F (37 °C) (Tympanic)                  DIAGNOSIS  Final diagnoses:   Hypokalemia   Right upper lobe pulmonary infiltrate    Chronic bronchitis, unspecified chronic bronchitis type (HCC)         DISPOSITION  To care facility           Josiah Henriquez MD  07/06/22 8275

## 2022-07-08 PROBLEM — R09.02 COPD WITH HYPOXIA (HCC): Status: ACTIVE | Noted: 2022-01-01

## 2022-07-08 PROBLEM — E87.6 HYPOKALEMIA: Status: ACTIVE | Noted: 2022-01-01

## 2022-07-08 PROBLEM — S72.002A CLOSED LEFT HIP FRACTURE, INITIAL ENCOUNTER (HCC): Status: ACTIVE | Noted: 2022-01-01

## 2022-07-08 PROBLEM — D69.6 THROMBOCYTOPENIA (HCC): Status: ACTIVE | Noted: 2022-01-01

## 2022-07-08 PROBLEM — J44.9 COPD WITH HYPOXIA (HCC): Status: ACTIVE | Noted: 2022-01-01

## 2022-07-08 PROBLEM — R74.8 ELEVATED LIVER ENZYMES: Status: ACTIVE | Noted: 2022-01-01

## 2022-07-08 PROBLEM — R56.9 SEIZURE (HCC): Status: ACTIVE | Noted: 2022-01-01

## 2022-07-08 NOTE — CONSULTS
Referring Provider: Bola Ortiz  Reason for Consultation: Left displaced intertrochanteric hip fracture    Patient Care Team:  Belinda King DO as PCP - General (Family Medicine)    Chief complaint left hip pain    Subjective .     History of present illness: The patient suffered a fall and has a displaced intertrochanteric hip fracture.  She is in a nursing home for pneumonia treatment.  Significant COPD and lung history.  Smoking history in the past.    Contralateral hip hemiarthroplasty completed by Dr. Damon she thinks 5 or 6 years ago.    She has perceived a leg length discrepancy since that significant injury.  Counseled that she has suffered yet another significant injury and leg length discrepancy can occur her primary goal would be fracture healing and ability to ambulate again.  Significant injury noted.      She has fairly significant left lower extremity swelling with pitting edema.  She reports that this is fairly acute and is not noted on the contralateral side.  No obvious evidence of DVT or PE      LOCATION: Left hip  QUALITY: Sharp  TIMING: Off-and-on  WORSENED WITH: Movement  IMPROVED WITH: Rest, medication, block  PAIN RATING (OUT OF 10): Up to 10 of 10 at the time of injury    Review of Systems  Pertinent items are noted in HPI, all other systems reviewed and negative    The rest of her HPI review of systems are noted per the ER note and internal medicine note.  Patient has significant pneumonia history, COPD, left lower extremity swelling    Oxygen dependent    ER note from 7/6/2022 also reviewed and hypokalemia noted at that time and at this admission as well.  Potassium and magnesium repletion were completed in the ER at that time.  She was on 2 L of oxygen 98% at that visit      [facility]        History  Family History   Problem Relation Age of Onset   • Hypertension Mother    • Diabetes Father      Social History     Socioeconomic History   • Marital status:     Tobacco Use   • Smoking status: Former Smoker     Years: 41.25     Types: Cigarettes     Quit date: 2012     Years since quitting: 10.5   • Smokeless tobacco: Never Used   Substance and Sexual Activity   • Alcohol use: Not Currently   • Drug use: Never   • Sexual activity: Not Currently     Past Surgical History:   Procedure Laterality Date   • BACK SURGERY     • CHEST TUBE INSERTION Left 10/20/2021   • CHOLECYSTECTOMY     • HYSTERECTOMY     • TOTAL HIP ARTHROPLASTY       Past Medical History:   Diagnosis Date   • Anxiety    • COPD (chronic obstructive pulmonary disease) ([facility])    • Hypertension    • Seizures (HCC)      Allergies   Allergen Reactions   • Adhesive Tape Other (See Comments)     Redness and fluuid filled blister under cardio monitor electrodes.    • Ciprofloxacin Other (See Comments)     PT STATES IT MAKES HER CRAZY   • Zithromax [Azithromycin] Other (See Comments)     PT STATES IT MAKES HER CRAZY    • Sulfa Antibiotics Unknown - Low Severity   • Temazepam Unknown - Low Severity   • Penicillins Rash       Current Facility-Administered Medications:   •  acetaminophen (TYLENOL) tablet 650 mg, 650 mg, Oral, Q8H, Dollar, Haley, CRNA  •  potassium chloride (MICRO-K) CR capsule 40 mEq, 40 mEq, Oral, PRN **OR** potassium chloride (KLOR-CON) packet 40 mEq, 40 mEq, Oral, PRN **OR** potassium chloride 10 mEq in 100 mL IVPB, 10 mEq, Intravenous, Q1H PRN, Krysten Jackson,   •  ropivacaine (NAROPIN) 0.2 % infusion (INFUSYSTEM), , Peripheral Nerve, Continuous, Dollar, Haley, CRNA  •  sodium chloride 0.9 % flush 10 mL, 10 mL, Intravenous, PRN, Emergency, Triage Protocol, MD    Current Outpatient Medications:   •  acetaminophen (TYLENOL) 325 MG tablet, Take 650 mg by mouth Every 4 (Four) Hours As Needed for Mild Pain ., Disp: , Rfl:   •  amLODIPine (NORVASC) 5 MG tablet, Take 5 mg by mouth Daily., Disp: , Rfl:   •  busPIRone (BUSPAR) 10 MG tablet, Take 10 mg by mouth 3 (Three) Times a Day., Disp: , Rfl:   •   diphenhydrAMINE-acetaminophen (TYLENOL PM)  MG tablet per tablet, Take 1 tablet by mouth At Night As Needed for Sleep., Disp: , Rfl:   •  famotidine (PEPCID) 10 MG tablet, Take 10 mg by mouth 2 (Two) Times a Day., Disp: , Rfl:   •  ipratropium-albuterol (DUO-NEB) 0.5-2.5 mg/3 ml nebulizer, Take 3 mL by nebulization Every 4 (Four) Hours As Needed for Wheezing or Shortness of Air., Disp: 360 mL, Rfl:   •  ipratropium-albuterol (DUO-NEB) 0.5-2.5 mg/3 ml nebulizer, Take 3 mL by nebulization Every 4 (Four) Hours., Disp: 360 mL, Rfl:   •  lactobacillus acidophilus (RISAQUAD) capsule capsule, Take 1 capsule by mouth Daily., Disp: , Rfl:   •  levETIRAcetam (KEPPRA) 500 MG tablet, Take 500 mg by mouth Every 12 (Twelve) Hours., Disp: , Rfl:   •  LORazepam (ATIVAN) 1 MG tablet, Take 1 mg by mouth Every 8 (Eight) Hours As Needed for Anxiety., Disp: , Rfl:   •  metoprolol succinate XL (TOPROL-XL) 25 MG 24 hr tablet, Take 25 mg by mouth Daily., Disp: , Rfl:   •  O2 (OXYGEN), Inhale 2 L/min Continuous., Disp: , Rfl:   •  PARoxetine (PAXIL) 20 MG tablet, Take 20 mg by mouth Daily., Disp: , Rfl:   •  potassium chloride 10 MEQ CR tablet, Take 1 tablet by mouth Daily., Disp: 7 tablet, Rfl: 0  •  predniSONE (DELTASONE) 10 MG tablet, Take 3 tablets by mouth Daily With Breakfast., Disp: , Rfl:   •  sennosides-docusate (PERICOLACE) 8.6-50 MG per tablet, Take 2 tablets by mouth 2 (Two) Times a Day., Disp: , Rfl:   •  umeclidinium-vilanterol (Anoro Ellipta) 62.5-25 MCG/INH aerosol powder  inhaler, Inhale 1 puff Daily., Disp: , Rfl:   •  levoFLOXacin (LEVAQUIN) 500 MG tablet, Take 500 mg by mouth Daily., Disp: , Rfl:       Objective     Vital Signs   Temp:  [97.4 °F (36.3 °C)] 97.4 °F (36.3 °C)  Heart Rate:  [] 101  Resp:  [18-20] 18  BP: (124-162)/(64-88) 133/74    Physical Exam:     General Appearance:    Alert, cooperative   Head:    Normocephalic, without obvious abnormality, atraumatic   Ears:    Ears intact with no obvious  external abnormalities noted   Throat:   No external oral excretions   Neck:   supple, trachea midline   Lungs:     respirations regular, even and unlabored, known COPD history    Heart:   Well-perfused distally   Chest Wall:    No abnormalities observed, no intercostal retractions noted   Abdomen:     soft nontender, nondistended, no guarding   Pulses:  Well-perfused distally but difficult to palpate pulses given pitting edema in the foot distally   Skin:  Bruising noted fairly significantly throughout including in the upper arm and scabs noted   Neurologic:   sensation intact distally     Orthopedic/MSK:  Left lower extremity EHL FHL tib ant gastrocsoleus demonstrated.  She has shortening and external rotation consistent with her intertrochanteric hip fracture.  She has fairly significant pitting edema below the knee.  No obvious calf pain but pitting edema noted makes it difficult to palpate pulses although well-perfused distally.  Block for anesthesia already in place with indwelling catheter       Results Review:   I reviewed the patient's new clinical results.      Hypokalemia noted      Results from last 7 days   Lab Units 07/08/22  0734   WBC 10*3/mm3 9.70   HEMOGLOBIN g/dL 13.3   HEMATOCRIT % 40.4   PLATELETS 10*3/mm3 58*     Results from last 7 days   Lab Units 07/08/22  0734   SODIUM mmol/L 143   POTASSIUM mmol/L 2.5*   CHLORIDE mmol/L 88*   CO2 mmol/L 44.0*   BUN mg/dL 22   CREATININE mg/dL 0.69   GLUCOSE mg/dL 190*   CALCIUM mg/dL 9.4     Results from last 7 days   Lab Units 07/08/22  0734   INR  1.15*     Results from last 7 days   Lab Units 07/08/22  0734   SODIUM mmol/L 143   POTASSIUM mmol/L 2.5*   CHLORIDE mmol/L 88*   CO2 mmol/L 44.0*   BUN mg/dL 22   CREATININE mg/dL 0.69   CALCIUM mg/dL 9.4   ALK PHOS U/L 125*   ALT (SGPT) U/L 193*   AST (SGOT) U/L 96*   GLUCOSE mg/dL 190*       XR Femur 2 View Left    Result Date: 7/8/2022  DATE OF EXAM: 7/8/2022 7:36 AM  PROCEDURE: XR FEMUR 2 VW LEFT-   INDICATIONS: LE Injury triage protocol  COMPARISON: No comparisons available.  TECHNIQUE: Two radiographic views of the left femur were obtained.  FINDINGS: There is a comminuted fracture of the intertrochanteric region of the left femur. The greater trochanter is a separate fragment. There is slight varus angulation. The remainder of the femur is intact      Intertrochanteric left femur fracture  This report was finalized on 7/8/2022 8:11 AM by Eric Middleton.      022 11:51 PM Mountain Time    XR Pelvis 1 or 2 View    Result Date: 7/8/2022  DATE OF EXAM: 7/8/2022 7:35 AM  PROCEDURE: XR PELVIS 1 OR 2 VW-  INDICATIONS: fall  COMPARISON: No comparisons available.  TECHNIQUE: An AP radiologic view of the pelvis was obtained.  FINDINGS: Fracture of the intertrochanteric region of the left femur. Greater trochanter is a separate fragment. Femoral head is not dislocated. There has been previous right hip hemiarthroplasty. No fracture of the pelvis is demonstrated. There is a lumbosacral fusion      Intertrochanteric left femur fracture  This report was finalized on 7/8/2022 8:12 AM by Eric Middleton.      I reviewed and interpreted the images above showing comminution displacement intertrochanteric hip fracture with significant displacement-left hip.  Contralateral hip has a hemiarthroplasty noted.    Assessment & Plan       Closed left hip fracture, initial encounter (HCC)    Essential hypertension    COPD with hypoxia (HCC)    Thrombocytopenia (HCC)    Hypokalemia    Elevated liver enzymes    Seizure (HCC)        I discussed the patient's findings and my recommendations with patient      Left displaced intertrochanteric hip fracture  N.p.o. after midnight  Patient is boarded for surgery 7/9/2022 a.m.  Consent will need to be obtained for left hip operative fixation- discussed with bedside team      Hypokalemia-we will need repleted per ER or admitting team in advance of surgery on 7/9/2022          I discussed at  length with the patient and family about the risks of hip surgery.  Non-operative measures would be fraught with inability to walk and concern for development of pressure sores/UTI/pneumonia and bed bound.      Operative measures for this hip fracture would be a cephalomedullary nail to fix the fracture.  There is a known high mortality rate reported in the literature for this injury/this procedure in this age population and potential risks include the risk of infection, nonunion, malunion, additional fracture, revision surgery, hardware failure, DVT/PE, MI, stroke, death, anesthesia complications, and medical complications.      Given the significant injury there is a potential for prolonged recovery and possible stay in rehabilitation at the discretion of the admitting medical team.    Procedure: LEFT Hip operative fixation with cephalomedullary nail        Flip Ying MD, FAAOS  Orthopedic Surgeon  Fellowship-trained Shoulder and Elbow Surgeon  [facility]  Orthopedics and Sports Medicine  [address] [place] [address]. [address]  [place] [phone]    Flip Ying MD  07/08/22  13:35 EDT

## 2022-07-08 NOTE — ED PROVIDER NOTES
Subjective   81-year-old female presents emergency department today after having a fall this morning and injuring her left hip.  She reports she got up to go to the bathroom states that she had a trip and fall and landed she thinks that she prior broke her left hip.  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  She states that that she is unable to bear weight.  Having quite a bit of pain.  She did not have any other injuries.  She is not on an anticoagulant.  She did eat at about 6 AM this morning.  She denies any abdominal pain or chest pain associated with this.  She states this is not a syncopal episode.  She is currently in a skilled nursing facility rehabilitating from a pneumonia admission at the end of May.  She has a history of COPD on chronic steroids on chronic oxygen has a history of seizures.      History provided by:  Patient and EMS personnel   used: No    Fall  Mechanism of injury: fall    Injury location:  Pelvis  Pelvic injury location:  L hip  Incident location:  [facility]  Time since incident:  1 hour  Arrived directly from scene: yes    Fall:     Fall occurred:  Walking    Impact surface:  Hard floor    Point of impact:  Buttocks    Entrapped after fall: no    Suspicion of alcohol use: no    Suspicion of drug use: no    Tetanus status:  Unknown  Associated symptoms: no abdominal pain, no back pain, no chest pain, no headaches, no hearing loss, no loss of consciousness, no neck pain, no seizures and no vomiting    Risk factors: COPD, kidney disease and steroid use    Risk factors: no anticoagulation therapy        Review of Systems   Constitutional: Negative for chills, diaphoresis and fever.   HENT: Negative for hearing loss.    Respiratory: Negative for chest tightness, shortness of breath and wheezing.    Cardiovascular: Negative for chest pain and palpitations.   Gastrointestinal: Negative for abdominal pain and vomiting.    Genitourinary: Negative for dysuria, frequency and urgency.   Musculoskeletal: Negative for back pain and neck pain.   Skin: Negative for pallor and rash.   Neurological: Negative for seizures, loss of consciousness and headaches.   Psychiatric/Behavioral: Negative.    All other systems reviewed and are negative.      Past Medical History:   Diagnosis Date   • Anxiety    • COPD (chronic obstructive pulmonary disease) ([facility])    • Hypertension    • Seizures ([facility])        Allergies   Allergen Reactions   • Adhesive Tape Other (See Comments)     Redness and fluuid filled blister under cardio monitor electrodes.    • Ciprofloxacin Other (See Comments)     PT STATES IT MAKES HER CRAZY   • Zithromax [Azithromycin] Other (See Comments)     PT STATES IT MAKES HER CRAZY    • Sulfa Antibiotics Unknown - Low Severity   • Temazepam Unknown - Low Severity   • Penicillins Rash       Past Surgical History:   Procedure Laterality Date   • BACK SURGERY     • CHEST TUBE INSERTION Left 10/20/2021   • CHOLECYSTECTOMY     • HYSTERECTOMY     • TOTAL HIP ARTHROPLASTY         Family History   Problem Relation Age of Onset   • Hypertension Mother    • Diabetes Father        Social History     Socioeconomic History   • Marital status:    Tobacco Use   • Smoking status: Former Smoker     Years: 41.25     Types: Cigarettes     Quit date: 2012     Years since quitting: 10.5   • Smokeless tobacco: Never Used   Substance and Sexual Activity   • Alcohol use: Not Currently   • Drug use: Never   • Sexual activity: Not Currently           Objective   Physical Exam  Vitals and nursing note reviewed.   Constitutional:       General: She is not in acute distress.     Appearance: She is well-developed. She is not diaphoretic.      Comments: Warm pink dry on her oxygen.  Does not appear to be any acute distress able to speak in complete sentences.   HENT:      Head: Normocephalic and atraumatic.      Nose: Nose normal.      Mouth/Throat:      Mouth:  Mucous membranes are moist.   Eyes:      General: No scleral icterus.     Conjunctiva/sclera: Conjunctivae normal.   Cardiovascular:      Rate and Rhythm: Normal rate and regular rhythm.      Heart sounds: Normal heart sounds. No murmur heard.  Pulmonary:      Effort: Pulmonary effort is normal. No respiratory distress.      Breath sounds: Wheezing present.      Comments: Scattered occasional wheezes and rhonchi no respiratory distress.  Abdominal:      General: Bowel sounds are normal.      Palpations: Abdomen is soft.      Tenderness: There is no abdominal tenderness.   Musculoskeletal:      Cervical back: Normal range of motion and neck supple.      Comments: Left lower extremity is shortened and externally rotated..  Leg is mildly edematous.  Posterior to vascular status pedis pulses are palpable cap refill less than 2 seconds.   Skin:     General: Skin is warm and dry.   Neurological:      Mental Status: She is alert and oriented to person, place, and time.   Psychiatric:         Behavior: Behavior normal.         Procedures           ED Course  ED Course as of 07/08/22 1726   Fri Jul 08, 2022 0912 A/G Ratio: 1.8 [JOSE ELIAS]   2282 Spoke to Dr. Flip Ying orthopedics on-call.  He states that he would like the hospitalist to try to get her evaluated any plans on taking her to surgery tomorrow morning.  He also asked to have the hip block done by anesthesia. [JOSE ELIAS]   0483 I spoke to anesthesia they are going to come see the patient here to perform a hip block in the emergency department. [JOSE ELIAS]      ED Course User Index  [JOSE ELIAS] Xavier Ortiz PA                                 Recent Results (from the past 24 hour(s))   ECG 12 Lead    Collection Time: 07/08/22  7:33 AM   Result Value Ref Range    QT Interval 340 ms    QTC Interval 453 ms   Lavender Top    Collection Time: 07/08/22  7:34 AM   Result Value Ref Range    Extra Tube hold for add-on    Gold Top - SST    Collection Time: 07/08/22  7:34 AM   Result Value  Ref Range    Extra Tube Hold for add-ons.    Gray Top    Collection Time: 07/08/22  7:34 AM   Result Value Ref Range    Extra Tube Hold for add-ons.    Light Blue Top    Collection Time: 07/08/22  7:34 AM   Result Value Ref Range    Extra Tube Hold for add-ons.    Comprehensive Metabolic Panel    Collection Time: 07/08/22  7:34 AM    Specimen: Blood   Result Value Ref Range    Glucose 190 (H) 65 - 99 mg/dL    BUN 22 8 - 23 mg/dL    Creatinine 0.69 0.57 - 1.00 mg/dL    Sodium 143 136 - 145 mmol/L    Potassium 2.5 (C) 3.5 - 5.2 mmol/L    Chloride 88 (L) 98 - 107 mmol/L    CO2 44.0 (H) 22.0 - 29.0 mmol/L    Calcium 9.4 8.6 - 10.5 mg/dL    Total Protein 5.8 (L) 6.0 - 8.5 g/dL    Albumin 3.70 3.50 - 5.20 g/dL    ALT (SGPT) 193 (H) 1 - 33 U/L    AST (SGOT) 96 (H) 1 - 32 U/L    Alkaline Phosphatase 125 (H) 39 - 117 U/L    Total Bilirubin 1.5 (H) 0.0 - 1.2 mg/dL    Globulin 2.1 gm/dL    A/G Ratio 1.8 g/dL    BUN/Creatinine Ratio 31.9 (H) 7.0 - 25.0    Anion Gap 11.0 5.0 - 15.0 mmol/L    eGFR 87.9 >60.0 mL/min/1.73   Protime-INR    Collection Time: 07/08/22  7:34 AM    Specimen: Blood   Result Value Ref Range    Protime 14.7 (H) 11.4 - 14.4 Seconds    INR 1.15 (H) 0.84 - 1.13   CBC Auto Differential    Collection Time: 07/08/22  7:34 AM    Specimen: Blood   Result Value Ref Range    WBC 9.70 3.40 - 10.80 10*3/mm3    RBC 4.25 3.77 - 5.28 10*6/mm3    Hemoglobin 13.3 12.0 - 15.9 g/dL    Hematocrit 40.4 34.0 - 46.6 %    MCV 95.1 79.0 - 97.0 fL    MCH 31.3 26.6 - 33.0 pg    MCHC 32.9 31.5 - 35.7 g/dL    RDW 15.1 12.3 - 15.4 %    RDW-SD 53.1 37.0 - 54.0 fl    MPV 12.0 6.0 - 12.0 fL    Platelets 58 (L) 140 - 450 10*3/mm3    Neutrophil % 61.8 42.7 - 76.0 %    Lymphocyte % 24.8 19.6 - 45.3 %    Monocyte % 5.1 5.0 - 12.0 %    Eosinophil % 0.2 (L) 0.3 - 6.2 %    Basophil % 0.6 0.0 - 1.5 %    Immature Grans % 7.5 (H) 0.0 - 0.5 %    Neutrophils, Absolute 5.99 1.70 - 7.00 10*3/mm3    Lymphocytes, Absolute 2.41 0.70 - 3.10 10*3/mm3     Monocytes, Absolute 0.49 0.10 - 0.90 10*3/mm3    Eosinophils, Absolute 0.02 0.00 - 0.40 10*3/mm3    Basophils, Absolute 0.06 0.00 - 0.20 10*3/mm3    Immature Grans, Absolute 0.73 (H) 0.00 - 0.05 10*3/mm3    nRBC 1.3 (H) 0.0 - 0.2 /100 WBC   Scan Slide    Collection Time: 07/08/22  7:34 AM    Specimen: Blood   Result Value Ref Range    RBC Morphology Normal Normal    WBC Morphology Normal Normal    Large Platelets Slight/1+ None Seen   Green Top (Gel)    Collection Time: 07/08/22  8:07 AM   Result Value Ref Range    Extra Tube Hold for add-ons.    COVID-19, [facility] IN-HOUSE,NASAL Swab (NO TRANSPORT MEDIA) 2 HR TAT - Swab, Nasopharynx    Collection Time: 07/08/22  8:36 AM    Specimen: Nasopharynx; Swab   Result Value Ref Range    COVID19 Presumptive Negative Presumptive Negative - Ref. Range   Type & Screen    Collection Time: 07/08/22  8:43 AM    Specimen: Blood   Result Value Ref Range    ABO Type A     RH type Positive     Antibody Screen Negative     T&S Expiration Date 7/11/2022 11:59:59 PM    Urinalysis With Microscopic If Indicated (No Culture) - Urine, Clean Catch    Collection Time: 07/08/22 12:08 PM    Specimen: Urine, Clean Catch   Result Value Ref Range    Color, UA Yellow Yellow, Straw    Appearance, UA Clear Clear    pH, UA 7.0 5.0 - 8.0    Specific Gravity, UA 1.015 1.001 - 1.030    Glucose, UA Negative Negative    Ketones, UA Negative Negative    Bilirubin, UA Negative Negative    Blood, UA Negative Negative    Protein, UA 30 mg/dL (1+) (A) Negative    Leuk Esterase, UA Negative Negative    Nitrite, UA Negative Negative    Urobilinogen, UA 1.0 E.U./dL 0.2 - 1.0 E.U./dL   Urinalysis, Microscopic Only - Urine, Clean Catch    Collection Time: 07/08/22 12:08 PM    Specimen: Urine, Clean Catch   Result Value Ref Range    RBC, UA 7-12 (A) None Seen, 0-2 /HPF    WBC, UA 0-2 None Seen, 0-2 /HPF    Bacteria, UA None Seen None Seen, Trace /HPF    Squamous Epithelial Cells, UA 0-2 None Seen, 0-2 /HPF    Hyaline  Casts, UA 0-6 0 - 6 /LPF    Methodology Automated Microscopy    Basic Metabolic Panel    Collection Time: 07/08/22  3:43 PM    Specimen: Blood   Result Value Ref Range    Glucose 145 (H) 65 - 99 mg/dL    BUN 22 8 - 23 mg/dL    Creatinine 0.61 0.57 - 1.00 mg/dL    Sodium 142 136 - 145 mmol/L    Potassium 2.4 (C) 3.5 - 5.2 mmol/L    Chloride 91 (L) 98 - 107 mmol/L    CO2 43.0 (H) 22.0 - 29.0 mmol/L    Calcium 8.7 8.6 - 10.5 mg/dL    BUN/Creatinine Ratio 36.1 (H) 7.0 - 25.0    Anion Gap 8.0 5.0 - 15.0 mmol/L    eGFR 90.5 >60.0 mL/min/1.73   Platelet Ct On Citrated Bld    Collection Time: 07/08/22  3:43 PM    Specimen: Blood   Result Value Ref Range    Platelets (Citrated Blood) 34 (C) 140 - 450 10*3/mm3   Prepare Platelet Pheresis, 2 Units    Collection Time: 07/08/22  5:09 PM   Result Value Ref Range    Product Code K5379U44     Unit Number [id number]     UNIT  ABO A     UNIT  RH POS     Dispense Status XM     Blood Expiration Date 202207092359     Blood Type Barcode 6200     Product Code F2899Z11     Unit Number [id number]     UNIT  ABO O     UNIT  RH POS     Dispense Status XM     Blood Expiration Date 202207092359     Blood Type Barcode 5100      Note: In addition to lab results from this visit, the labs listed above may include labs taken at another facility or during a different encounter within the last 24 hours. Please correlate lab times with ED admission and discharge times for further clarification of the services performed during this visit.    XR Chest 1 View   Final Result   Stable chest. There is stable right suprahilar atelectasis and   underlying pulmonary emphysema. There is stable small right larger than   left pleural effusions. Mild increased interstitial markings in the   peripheral lung zones may indicate an element of mild interstitial edema       This report was finalized on 7/8/2022 8:15 AM by Eric Middleton.          XR Pelvis 1 or 2 View   Final Result   Intertrochanteric left  femur fracture       This report was finalized on 7/8/2022 8:12 AM by Eric Middleton.          XR Femur 2 View Left   Final Result   Intertrochanteric left femur fracture       This report was finalized on 7/8/2022 8:11 AM by Eric Middleton.          US Liver    (Results Pending)     Vitals:    07/08/22 1330 07/08/22 1425 07/08/22 1553 07/08/22 1629   BP: 134/88 131/66  130/73   BP Location:  Left arm     Patient Position:  Lying     Pulse: 105 106  111   Resp: 18 18     Temp:  98.9 °F (37.2 °C)     TempSrc:  Oral     SpO2: 93% 91% 93%    Weight:       Height:         Medications   sodium chloride 0.9 % flush 10 mL (has no administration in time range)   tranexamic acid 1000 mg in 100 mL 0.7% NaCl infusion (premix) (has no administration in time range)   tranexamic acid 1000 mg in 100 mL 0.7% NaCl infusion (premix) (has no administration in time range)   ceFAZolin in dextrose (ANCEF) IVPB solution 2 g (has no administration in time range)   ropivacaine (NAROPIN) 0.2 % infusion (INFUSYSTEM) ( Peripheral Nerve Not Given 7/8/22 1133)   acetaminophen (TYLENOL) tablet 650 mg (has no administration in time range)   amLODIPine (NORVASC) tablet 5 mg (5 mg Oral Given 7/8/22 1629)   busPIRone (BUSPAR) tablet 10 mg (10 mg Oral Given 7/8/22 1629)   famotidine (PEPCID) tablet 10 mg (10 mg Oral Given 7/8/22 1709)   ipratropium-albuterol (DUO-NEB) nebulizer solution 3 mL (has no administration in time range)   lactobacillus acidophilus (RISAQUAD) capsule 1 capsule (1 capsule Oral Given 7/8/22 1629)   levETIRAcetam (KEPPRA) tablet 500 mg (has no administration in time range)   metoprolol succinate XL (TOPROL-XL) 24 hr tablet 25 mg (25 mg Oral Given 7/8/22 1629)   PARoxetine (PAXIL) tablet 20 mg (20 mg Oral Given 7/8/22 1629)   ipratropium-albuterol (DUO-NEB) nebulizer solution 3 mL (3 mL Nebulization Given 7/8/22 1421)   sodium chloride 0.9 % flush 10 mL (has no administration in time range)   sodium chloride 0.9 % flush 10 mL (has  no administration in time range)   HYDROcodone-acetaminophen (NORCO) 5-325 MG per tablet 1 tablet (1 tablet Oral Given 7/8/22 1506)   morphine injection 1 mg (has no administration in time range)     And   naloxone (NARCAN) injection 0.4 mg (has no administration in time range)   sennosides-docusate (PERICOLACE) 8.6-50 MG per tablet 2 tablet (has no administration in time range)     And   polyethylene glycol (MIRALAX) packet 17 g (has no administration in time range)     And   bisacodyl (DULCOLAX) EC tablet 5 mg (has no administration in time range)     And   bisacodyl (DULCOLAX) suppository 10 mg (has no administration in time range)   calcium carbonate (TUMS) chewable tablet 500 mg (200 mg elemental) (has no administration in time range)   potassium chloride (MICRO-K) CR capsule 40 mEq (has no administration in time range)     Or   potassium chloride (KLOR-CON) packet 40 mEq (has no administration in time range)     Or   potassium chloride 10 mEq in 100 mL IVPB (has no administration in time range)   acetaminophen (TYLENOL) tablet 650 mg (650 mg Oral Not Given 7/8/22 1134)   potassium chloride (MICRO-K) CR capsule 40 mEq ( Oral Canceled Entry 7/8/22 1708)     Or   potassium chloride (KLOR-CON) packet 40 mEq ( Oral Not Given:  See Alt 7/8/22 1708)     Or   potassium chloride 10 mEq in 100 mL IVPB ( Intravenous Not Given:  See Alt 7/8/22 1708)   HYDROmorphone (DILAUDID) injection 0.25 mg (0.25 mg Intravenous Given 7/8/22 0836)   ondansetron (ZOFRAN) injection 4 mg (4 mg Intravenous Given 7/8/22 0836)   potassium chloride 10 mEq in 100 mL IVPB (0 mEq Intravenous Stopped 7/8/22 1014)   fat emulsion (INTRALIPID,LIPOSYN) 20 % infusion  - ADS Override Pull (  Override Pull-Not given 7/8/22 1135)     ECG/EMG Results (last 24 hours)     Procedure Component Value Units Date/Time    ECG 12 Lead [[id number]] Collected: 07/08/22 0733     Updated: 07/08/22 0738    ECG 12 Lead [[id number]] Collected: 07/08/22 0908     Updated:  07/08/22 0915    ECG 12 Lead [[id number]] Collected: 07/08/22 0920     Updated: 07/08/22 0920        ECG 12 Lead   Preliminary Result         ECG 12 Lead   Preliminary Result         ECG 12 Lead   Final Result   Test Reason : fall   Blood Pressure :   */*   mmHG   Vent. Rate : 107 BPM     Atrial Rate : 107 BPM      P-R Int : 136 ms          QRS Dur :  86 ms       QT Int : 340 ms       P-R-T Axes :  68  57 143 degrees      QTc Int : 453 ms      Sinus tachycardia   Possible Left atrial enlargement   ST & T wave abnormality, consider lateral ischemia   Abnormal ECG   When compared with ECG of 12-MAY-2022 14:22,   premature supraventricular complexes are no longer present   ST now depressed in Lateral leads   Inverted T waves have replaced nonspecific T wave abnormality in Lateral    leads   QT has lengthened   Confirmed by JARED CHU MD ([id number]) on 7/8/2022 4:34:39 PM      Referred By: EDMD           Confirmed By: JARED CHU MD                    MDM  Number of Diagnoses or Management Options  Closed left hip fracture, initial encounter (HCC): new and requires workup  History of COPD: established and worsening  Hypokalemia: new and requires workup     Amount and/or Complexity of Data Reviewed  Clinical lab tests: reviewed and ordered  Tests in the radiology section of CPT®: reviewed and ordered  Tests in the medicine section of CPT®: ordered and reviewed  Discuss the patient with other providers: yes    Patient Progress  Patient progress: stable      Final diagnoses:   Closed left hip fracture, initial encounter ([facility])   Hypokalemia   History of COPD       ED Disposition  ED Disposition     ED Disposition   Decision to Admit    Condition   --    Comment   Level of Care: Telemetry [5]   Diagnosis: Closed left hip fracture, initial encounter ([facility]) [[id number]]   Admitting Physician: HARRIET SANTOS [[id number]]   Certification: I Certify That Inpatient Hospital Services Are Medically Necessary For Greater Than 2 Midnights                No follow-up provider specified.       Medication List      No changes were made to your prescriptions during this visit.          Xavier Ortiz PA  07/08/22 2405

## 2022-07-08 NOTE — CASE MANAGEMENT/SOCIAL WORK
Discharge Planning Assessment  [facility]     Patient Name: Sierra Saba  MRN: [id number]  Today's Date: 7/8/2022    Admit Date: 7/8/2022     Discharge Needs Assessment     Row Name 07/08/22 1135       Living Environment    People in Home other (see comments)  @ [facility] - skilled facility    Current Living Arrangements extended care facility    Primary Care Provided by other (see comments)  facility staff    Provides Primary Care For no one, unable/limited ability to care for self    Family Caregiver if Needed other (see comments)  facility staff    Quality of Family Relationships helpful;involved    Able to Return to Prior Arrangements yes       Resource/Environmental Concerns    Resource/Environmental Concerns none    Transportation Concerns none       Transition Planning    Patient/Family Anticipates Transition to inpatient rehabilitation facility    Patient/Family Anticipated Services at Transition ;rehabilitation services    Transportation Anticipated health plan transportation       Discharge Needs Assessment    Readmission Within the Last 30 Days no previous admission in last 30 days    Current Outpatient/Agency/Support Group skilled nursing facility    Equipment Currently Used at Home bath bench;oxygen;rollator;commode;pulse ox;shower chair;walker, rolling;grab bar;hospital bed    Concerns to be Addressed discharge planning    Anticipated Changes Related to Illness inability to care for self    Outpatient/Agency/Support Group Needs skilled nursing facility    Discharge Facility/Level of Care Needs nursing facility, skilled    Current Discharge Risk dependent with mobility/activities of daily living               Discharge Plan     Row Name 07/08/22 1137       Plan    Plan Initial    Plan Comments CM spoke with patient's son, Judd via phone regarding DC planning. Patient has been at [facility] since May 24 for skilled rehab. Patient's son would like patient to  return for [facility]'l rehab at discharge. Patient is dependent with ADL's, uses a RW primarily to ambulate. Patient has multiple DME. Patient is on 2L NC O2 24/7 provided by [employer]. Patient has medical insurance, prescription coverage and is able to afford/obtain medications without difficulty. Goal is to return to [facility] [place]. CM will continue to follow.    Final Discharge Disposition Code 30 - still a patient              Continued Care and Services - Admitted Since 7/8/2022    Coordination has not been started for this encounter.     Selected Continued Care - Prior Encounters Includes selections from prior encounters from 4/9/2022 to 7/8/2022    Discharged on 5/24/2022 Admission date: 5/12/2022 - Discharge disposition: Skilled Nursing Facility (DC - External)    Destination     Service Provider Selected Services Address Phone Fax Patient Preferred    [facility]  Skilled Nursing [address] [place] [id number] [phone] --                       Demographic Summary     Row Name 07/08/22 1133       General Information    Arrived From other (see comments)  [facility] - skilled rehab    Referral Source emergency department    Reason for Consult discharge planning    Preferred Language English       Contact Information    Contact Information Comments JUSTIN ADAME (POA) Son [phone]               Functional Status     Row Name 07/08/22 1134       Functional Status    Usual Activity Tolerance moderate    Current Activity Tolerance poor       Assessment of Health Literacy    How often do you have someone help you read hospital materials? Always    How often do you have problems learning about your medical condition because of difficulty understanding written information? Always    How often do you have a problem understanding what is told to you about your medical condition? Always    How confident are you filling out medical forms by yourself? Not at all    Health Literacy Moderate        Functional Status, IADL    Medications completely dependent    Meal Preparation completely dependent    Housekeeping completely dependent    Laundry completely dependent    Shopping completely dependent       Mental Status    General Appearance WDL WDL       Mental Status Summary    Recent Changes in Mental Status/Cognitive Functioning unable to assess       Employment/    Employment Status retired               Psychosocial    No documentation.                Abuse/Neglect    No documentation.                Legal    No documentation.                Substance Abuse    No documentation.                Patient Forms    No documentation.                   Yuki Jackson RN

## 2022-07-08 NOTE — H&P
[facility] Medicine Services  HISTORY AND PHYSICAL    Patient Name: Sierra Saba  : 1940  MRN: [id number]  Primary Care Physician: Belinda King DO  Date of admission: 2022      Subjective   Subjective     Chief Complaint:  Fall, left hip pain     HPI:  Sierra Saba is a 81 y.o. female with history of COPD on 2-3L of O2, HTN, seizure who presents today following a fall. States she was exiting the bathroom and felt weak. Denies LOC. Denies palpitations or CP. States she typically gets around with a walker. Currently with left sided hip pain. States that she was recently started on treatment for PNA, however, denies increase SOA, wheezing or cough. Asking for pain control. Called and updated son and answered all questions.       Review of Systems   Gen- No fevers, chills  CV- No chest pain, palpitations  Resp- No cough, dyspnea  GI- No N/V/D, abd pain    All other systems reviewed and are negative.     Personal History     Past Medical History:   Diagnosis Date   • Anxiety    • COPD (chronic obstructive pulmonary disease) (HCC)    • Hypertension    • Seizures (HCC)              Past Surgical History:   Procedure Laterality Date   • BACK SURGERY     • CHEST TUBE INSERTION Left 10/20/2021   • CHOLECYSTECTOMY     • HYSTERECTOMY     • TOTAL HIP ARTHROPLASTY         Family History:  family history includes Diabetes in her father; Hypertension in her mother. Otherwise pertinent FHx was reviewed and unremarkable.     Social History:  reports that she quit smoking about 10 years ago. Her smoking use included cigarettes. She quit after 41.25 years of use. She has never used smokeless tobacco. She reports previous alcohol use. She reports that she does not use drugs.  Social History     Social History Narrative     in . Sister is living with her since she left the [facility]. Shes been wearing 2 LPM of oxygen at home.        Medications:  Available home medication information  reviewed.  (Not in a hospital admission)      Allergies   Allergen Reactions   • Adhesive Tape Other (See Comments)     Redness and fluuid filled blister under cardio monitor electrodes.    • Ciprofloxacin Other (See Comments)     PT STATES IT MAKES HER CRAZY   • Zithromax [Azithromycin] Other (See Comments)     PT STATES IT MAKES HER CRAZY    • Sulfa Antibiotics Unknown - Low Severity   • Temazepam Unknown - Low Severity   • Penicillins Rash       Objective   Objective     Vital Signs:   Temp:  [97.4 °F (36.3 °C)] 97.4 °F (36.3 °C)  Heart Rate:  [101-114] 105  Resp:  [18-20] 18  BP: (124-162)/(64-88) 162/88  Flow (L/min):  [4] 4       Physical Exam   Constitutional: Awake, alert; frail  Eyes: PERRLA, sclerae anicteric, no conjunctival injection  HENT: NCAT, mucous membranes moist  Neck: Supple, no thyromegaly, no lymphadenopathy, trachea midline  Respiratory: Clear to auscultation bilaterally, nonlabored respirations. No wheeze/rhonchi   Cardiovascular: RRR, no murmurs, rubs, or gallops, palpable pedal pulses bilaterally  Gastrointestinal: Positive bowel sounds, soft, nontender, nondistended  Musculoskeletal: left leg with +2 edema; trace edema right leg   Psychiatric: Appropriate affect, cooperative  Neurologic: Oriented x 3, strength symmetric in all extremities, Cranial Nerves grossly intact to confrontation, speech clear  Skin: No rashes      Result Review:  I have personally reviewed the results from the time of this admission to 7/8/2022 11:24 EDT and agree with these findings:  []  Laboratory list / accordion  []  Microbiology  []  Radiology  []  EKG/Telemetry   []  Cardiology/Vascular   []  Pathology  []  Old records  []  Other:  Most notable findings include:         LAB RESULTS:      Lab 07/08/22  0734 07/06/22  0113 07/05/22  1836   WBC 9.70 10.28 10.23   HEMOGLOBIN 13.3 13.4 13.5   HEMATOCRIT 40.4 39.6 40.1   PLATELETS 58* 72* 73*   NEUTROS ABS 5.99 7.71*  --    IMMATURE GRANS (ABS) 0.73*  --   --     LYMPHS ABS 2.41  --   --    MONOS ABS 0.49  --   --    EOS ABS 0.02 0.00  --    MCV 95.1 91.9 92.0   PROTIME 14.7*  --   --    INR 1.15*  --   --          Lab 07/08/22  0734 07/06/22  0113 07/05/22  1836   SODIUM 143 141 141   POTASSIUM 2.5* 2.4* 2.5*   CHLORIDE 88* 89* 88*   CO2 44.0* 42.0* 40.0*   ANION GAP 11.0 10.0 13.0   BUN 22 23 21   CREATININE 0.69 0.63 0.70   EGFR 87.9 89.8 87.6   GLUCOSE 190* 215* 131*   CALCIUM 9.4 8.7 8.8   MAGNESIUM  --  2.0  --          Lab 07/08/22  0734 07/06/22  0113 07/05/22  1836   TOTAL PROTEIN 5.8* 5.4* 5.6*   ALBUMIN 3.70 3.40* 3.50   GLOBULIN 2.1 2.0 2.1   ALT (SGPT) 193* 213* 225*   AST (SGOT) 96* 84* 94*   BILIRUBIN 1.5* 1.0 1.1   ALK PHOS 125* 102 103                 Lab 07/08/22  0843   ABO TYPING A   RH TYPING Positive   ANTIBODY SCREEN Negative         UA    Urinalysis 2/23/22   Specific [place], UA 1.011   Ketones, UA Negative   Blood, UA Negative   Leukocytes, UA Negative   Nitrite, UA Negative             Microbiology Results (last 10 days)     Procedure Component Value - Date/Time    COVID PRE-OP / PRE-PROCEDURE SCREENING ORDER (NO ISOLATION) - Swab, Nasopharynx [[id number]]  (Normal) Collected: 07/08/22 0836    Lab Status: Final result Specimen: Swab from Nasopharynx Updated: 07/08/22 0943    Narrative:      The following orders were created for panel order COVID PRE-OP / PRE-PROCEDURE SCREENING ORDER (NO ISOLATION) - Swab, Nasopharynx.  Procedure                               Abnormality         Status                     ---------                               -----------         ------                     COVID-19, ABBOTT IN-HOUS...[[id number]]  Normal              Final result                 Please view results for these tests on the individual orders.    COVID-19, ABBOTT IN-HOUSE,NASAL Swab (NO TRANSPORT MEDIA) 2 HR TAT - Swab, Nasopharynx [[id number]]  (Normal) Collected: 07/08/22 0836    Lab Status: Final result Specimen: Swab from Nasopharynx Updated:  07/08/22 0943     COVID19 Presumptive Negative    Narrative:      Fact sheet for providers: https://www.fda.gov/media/329049/download     Fact sheet for patients: https://www.fda.gov/media/198690/download    Test performed by PCR.  If inconsistent with clinical signs and symptoms patient should be tested with different authorized molecular test.          XR Femur 2 View Left    Result Date: 7/8/2022  DATE OF EXAM: 7/8/2022 7:36 AM  PROCEDURE: XR FEMUR 2 VW LEFT-  INDICATIONS: LE Injury triage protocol  COMPARISON: No comparisons available.  TECHNIQUE: Two radiographic views of the left femur were obtained.  FINDINGS: There is a comminuted fracture of the intertrochanteric region of the left femur. The greater trochanter is a separate fragment. There is slight varus angulation. The remainder of the femur is intact      Impression: Intertrochanteric left femur fracture  This report was finalized on 7/8/2022 8:11 AM by Eric Middleton.      XR Chest 1 View    Result Date: 7/8/2022  DATE OF EXAM: 7/8/2022 7:45 AM  PROCEDURE: XR CHEST 1 VW-  INDICATIONS: Lower extremity injury  COMPARISON: 07/06/2022  TECHNIQUE: Single radiographic AP view of the chest was obtained.  FINDINGS: Heart size normal. Mild prominence of the central pulmonary vasculature. Attenuated vessels in the left upper lobe due to emphysema. Mild increased interstitial markings in the peripheral lungs. Stable bandlike opacity in the right suprahilar region. Mild blunting of the right costophrenic angle and minimal blunting of the left costophrenic angle. Overall no significant change from yesterday's study      Impression: Stable chest. There is stable right suprahilar atelectasis and underlying pulmonary emphysema. There is stable small right larger than left pleural effusions. Mild increased interstitial markings in the peripheral lung zones may indicate an element of mild interstitial edema  This report was finalized on 7/8/2022 8:15 AM by Eric  Kane.      XR Pelvis 1 or 2 View    Result Date: 7/8/2022  DATE OF EXAM: 7/8/2022 7:35 AM  PROCEDURE: XR PELVIS 1 OR 2 VW-  INDICATIONS: fall  COMPARISON: No comparisons available.  TECHNIQUE: An AP radiologic view of the pelvis was obtained.  FINDINGS: Fracture of the intertrochanteric region of the left femur. Greater trochanter is a separate fragment. Femoral head is not dislocated. There has been previous right hip hemiarthroplasty. No fracture of the pelvis is demonstrated. There is a lumbosacral fusion      Impression: Intertrochanteric left femur fracture  This report was finalized on 7/8/2022 8:12 AM by Eric Middleton.      FICB    Result Date: 7/8/2022  Haley Foster CRNA     7/8/2022 10:42 AM FICB Patient reassessed immediately prior to procedure Patient location during procedure: pre-op Reason for block: procedure for pain and at surgeon's request Performed by AWAIS/CAA: Xavier Kennedy CRNA Assisted by: Caitlin Aranda RN Preanesthetic Checklist Completed: patient identified, IV checked, site marked, risks and benefits discussed, surgical consent, monitors and equipment checked, pre-op evaluation and timeout performed Prep: Pt Position: supine Sterile barriers:cap, gloves and mask Prep: ChloraPrep Patient monitoring: blood pressure monitoring, continuous pulse oximetry and EKG Procedure Performed under: local infiltration Guidance:ultrasound guided Images:still images obtained, printed/placed on chart Laterality:left Block Type:fascia iliaca compartment Injection Technique:catheter Needle Type:echogenic Needle Gauge:18 G Resistance on Injection: none Catheter Size:20 G (20g) Cath Depth at skin: 11 cm Medications Used: ropivacaine (NAROPIN) 0.5 % injection, 25 mL Med administered at 7/8/2022 10:35 AM Medications Preservative Free Saline:25ml Post Assessment Injection Assessment: negative aspiration for heme, no paresthesia on injection and incremental injection Patient Tolerance:comfortable throughout  block Complications:no Additional Notes Procedure:         Pt placed in supine position.   The insertion site was prepped in sterile fashion with Chlorapreop and clear plastic drapes.  Analgesia was provided by skin infiltration at insertion site with Lidocaine 1% 3mls.  A B-Tyson 18 g , 4 inch echogenic Touhy needle was advance In-plane under ultrasound guidance. The   Anterior superior Iliac crest was initially visualized and the probe was directed slightly medially and slightly towards the umbilicus.  The course of the needle was tracked over the sartorius muscle through the fascia Iliacus and into the anterior portion of the Iliacus muscle.  Major vessels where identified and avoided as where structures of the peritoneal cavity.  LA injection was made incrementally in 1-5ml amounts spread was visualized superiorly below fascia iliacus.  Injection was completed with negative aspiration of blood and negative intravascular injection.  Injection pressures where normal or minimal resistance.  A 20 g B-Tyson wire styleted catheter was then advance thru the needle and very easily placed in a superior or cephalad direction.  The catheter was secured at insertion site with exofin tissue adhesive, benzoin, and steristreps.  A CHG tegaderm dressing was placed over the insertion site and the nerve catheter labeled and capped.  Thank You.       Results for orders placed during the hospital encounter of 05/12/22    Adult Transthoracic Echo Complete W/ Cont if Necessary Per Protocol    Interpretation Summary  · There is a large left pleural effusion.  · Estimated right ventricular systolic pressure from tricuspid regurgitation is moderately elevated (45-55 mmHg). Calculated right ventricular systolic pressure from tricuspid regurgitation is 45 mmHg.  · Left ventricular ejection fraction appears to be 61 - 65%.  · There is moderate plaque in the descending aorta present.  · Left ventricular diastolic dysfunction is  noted.      Assessment & Plan   Assessment & Plan     Active Hospital Problems    Diagnosis  POA   • **Closed left hip fracture, initial encounter (HCC) [S72.002A]  Unknown     Added automatically from request for surgery [id number]     • Thrombocytopenia (HCC) [D69.6]  Unknown   • Hypokalemia [E87.6]  Unknown   • Elevated liver enzymes [R74.8]  Unknown   • COPD with hypoxia (HCC) [J44.9, R09.02]  Unknown   • Essential hypertension [I10]  Yes       Sierra Saba is a 81 y.o. female with history of COPD on 2-3L of O2, HTN, seizure who presents today following a fall. Imaging consistent with left intertrochanteric femur fracture. Labs with hypokalemia, new thrombocytopenia and elevated LFTs/bili.     Fall   Left intertrochanteric femur fracture   - Imaging with intertrochanteric left femur fracture   - Dr. Ying consulted and planning surgery tomorrow  - Block in place  - Continue PRN pain control  - NPO at midnight. SCDs.     Hypokalemia   - No precipitating medications. Will replace and monitor.     Thrombocytopenia   - New. Obtain plt count with citrated tube. Peripheral smear.     Elevated LFTs/bili   - LFTs have been elevated but now bili is up. Obtain liver US. Currently with no pain.     ? PNA  - On levaquin at her facility. Lab workup over the last 2-3 days with normal WBC. Chest imaging with no acute changes. She denies   - Will hold on additional antibiotics     HTN   - Continue home amlodipine and metoprolol     COPD  - On 2-3L home O2.   - Continue home inhalers     DVT prophylaxis:  SCDs      CODE STATUS:  Reviewed with patient - DNR/DNI   Code Status and Medical Interventions:   Ordered at: 07/08/22 1040     Medical Intervention Limits:    NO intubation (DNI)     Level Of Support Discussed With:    Patient     Code Status (Patient has no pulse and is not breathing):    No CPR (Do Not Attempt to Resuscitate)     Medical Interventions (Patient has pulse or is breathing):    Limited Support         Krysten  DO eRnetta  07/08/22

## 2022-07-08 NOTE — ANESTHESIA PROCEDURE NOTES
FICB      Patient reassessed immediately prior to procedure    Patient location during procedure: pre-op  Reason for block: procedure for pain and at surgeon's request  Performed by  CRNA/CAA: Xavier Kennedy, CRNA  Assisted by: Caitlin Aranda RN  Preanesthetic Checklist  Completed: patient identified, IV checked, site marked, risks and benefits discussed, surgical consent, monitors and equipment checked, pre-op evaluation and timeout performed  Prep:  Pt Position: supine  Sterile barriers:cap, gloves and mask  Prep: ChloraPrep  Patient monitoring: blood pressure monitoring, continuous pulse oximetry and EKG  Procedure  Performed under: local infiltration  Guidance:ultrasound guided  Images:still images obtained, printed/placed on chart    Laterality:left  Block Type:fascia iliaca compartment  Injection Technique:catheter  Needle Type:echogenic  Needle Gauge:18 G  Resistance on Injection: none  Catheter Size:20 G (20g)  Cath Depth at skin: 11 cm    Medications Used: ropivacaine (NAROPIN) 0.5 % injection, 25 mL  Med administered at 7/8/2022 10:35 AM      Medications  Preservative Free Saline:25ml    Post Assessment  Injection Assessment: negative aspiration for heme, no paresthesia on injection and incremental injection  Patient Tolerance:comfortable throughout block  Complications:no  Additional Notes  Procedure:                 Pt placed in supine position.   The insertion site was prepped in sterile fashion with Chlorapreop and clear plastic drapes.  Analgesia was provided by skin infiltration at insertion site with Lidocaine 1% 3mls.  A B-Tyson 18 g , 4 inch echogenic Touhy needle was advance In-plane under ultrasound guidance. The   Anterior superior Iliac crest was initially visualized and the probe was directed slightly medially and slightly towards the umbilicus.  The course of the needle was tracked over the sartorius muscle through the fascia Iliacus and into the anterior portion of the Iliacus muscle.   Major vessels where identified and avoided as where structures of the peritoneal cavity.  LA injection was made incrementally in 1-5ml amounts spread was visualized superiorly below fascia iliacus.  Injection was completed with negative aspiration of blood and negative intravascular injection.  Injection pressures where normal or minimal resistance.  A 20 g B-Tyson wire styleted catheter was then advance thru the needle and very easily placed in a superior or cephalad direction.  The catheter was secured at insertion site with exofin tissue adhesive, benzoin, and steristreps.  A CHG tegaderm dressing was placed over the insertion site and the nerve catheter labeled and capped.  Thank You.

## 2022-07-09 NOTE — SIGNIFICANT NOTE
Pt on 4-5 liters NC since admission (per RN, pt on 2-3 liters @ baseline). Overnight pt w/ frequent desats to 80's and most recently 70's on 6 liters. Pt also w/ slightly confusion this morning. RR increased (~26-30); BP and HR WNL. On exam, pt w/ increased respiratory effort and use of accessory muscles; breath sounds course/diminished w/ faint rales, no wheeze. Pt alert and oriented to self and place only.   -ABG  -CXR  -BiPAP   -labs   Will continue to monitor closely.

## 2022-07-09 NOTE — PLAN OF CARE
Goal Outcome Evaluation:  Plan of Care Reviewed With: patient        Progress: declining   One time dose of lopressor given. Placed on Bipap during the night due to being unable to maintain adequate O2 stat with NC. Increased confusion as night progressed. One dose of morphine give for pain. Chest Xray obtained but not yet resulted. Npo as of 0001. One of two CHGs completed. Awake most of the night.

## 2022-07-09 NOTE — PROGRESS NOTES
Acute pain service Inpatient Progress Note    Patient Name: Sierra Saba  :  1940  MRN:  [id number]        Acute Pain  Service Inpatient Progress Note:    Analgesia:Good  LOC: alert and awake  Resp Status: room air  Cardiac: VS stable  Side Effects:None  Catheter Site:clean, dressing intact and dry  Catheter type: peripheral nerve cath with infublock.  Volume: basal 1/PIB 10/PCA 10.  Dosing/Volume: ropivacaine 0.2%  Catheter Plan:Catheter to remain Insitu and Continue catheter infusion rate unchanged

## 2022-07-09 NOTE — PROGRESS NOTES
[facility] Medicine Services  PROGRESS NOTE    Patient Name: Sierra Saba  : 1940  MRN: [id number]    Date of Admission: 2022  Primary Care Physician: Belinda King DO    Subjective   Subjective     CC:  F/U hip fracture    HPI:  Had worsening respiratory status overnight requiring BIPAP.  More comfortable this morning.  She denies shortness of breath now on BIPAP.    ROS:  Limited due to BIPAP  Controlled pain, improved dyspnea    Objective   Objective     Vital Signs:   Temp:  [97 °F (36.1 °C)-98.9 °F (37.2 °C)] 97.5 °F (36.4 °C)  Heart Rate:  [] 93  Resp:  [15-26] 20  BP: (114-194)/() 129/77  Flow (L/min):  [4-6] 6     Physical Exam:  Constitutional: No acute distress, awake, alert, sitting up in bed on BIPAP, chronically ill appearing  HENT: NCAT, mucous membranes moist  Respiratory: Clear to auscultation bilaterally, respiratory effort normal on BIPAP  Cardiovascular: RRR, no murmurs, rubs, or gallops  Gastrointestinal: Soft, nontender, nondistended  Musculoskeletal: 1+ bilateral ankle edema L>R  Psychiatric: Appropriate affect, cooperative  Neurologic: Speech clear and fluent  Skin: No rashes on exposed surfaces    Results Reviewed:  LAB RESULTS:      Lab 22  0635 22  0514 22  0141 22  1543 22  0734 22  0113 22  1836   WBC  --  12.31* 6.74  --  9.70 10.28 10.23   HEMOGLOBIN  --  11.6* 10.3*  --  13.3 13.4 13.5   HEMATOCRIT  --  36.3 32.3*  --  40.4 39.6 40.1   PLATELETS  --  207 141 34* 58* 72* 73*   NEUTROS ABS  --  7.51* 4.17  --  5.99 7.71*  --    IMMATURE GRANS (ABS)  --   --  0.51*  --  0.73*  --   --    LYMPHS ABS  --   --  1.63  --  2.41  --   --    MONOS ABS  --   --  0.39  --  0.49  --   --    EOS ABS  --  0.12 0.01  --  0.02 0.00  --    MCV  --  95.5 95.6  --  95.1 91.9 92.0   PROCALCITONIN  --  5.29*  --   --   --   --   --    LACTATE 2.3* 2.4*  --   --   --   --   --    PROTIME  --   --   --   --  14.7*   --   --          Lab 07/09/22  0514 07/08/22  1543 07/08/22  0734 07/06/22  0113 07/05/22  1836   SODIUM 148* 142 143 141 141   POTASSIUM 4.2 2.4* 2.5* 2.4* 2.5*   CHLORIDE 96* 91* 88* 89* 88*   CO2 43.0* 43.0* 44.0* 42.0* 40.0*   ANION GAP 9.0 8.0 11.0 10.0 13.0   BUN 25* 22 22 23 21   CREATININE 0.69 0.61 0.69 0.63 0.70   EGFR 87.9 90.5 87.9 89.8 87.6   GLUCOSE 139* 145* 190* 215* 131*   CALCIUM 9.4 8.7 9.4 8.7 8.8   MAGNESIUM 2.2  --   --  2.0  --          Lab 07/08/22  0734 07/06/22  0113 07/05/22  1836   TOTAL PROTEIN 5.8* 5.4* 5.6*   ALBUMIN 3.70 3.40* 3.50   GLOBULIN 2.1 2.0 2.1   ALT (SGPT) 193* 213* 225*   AST (SGOT) 96* 84* 94*   BILIRUBIN 1.5* 1.0 1.1   ALK PHOS 125* 102 103         Lab 07/08/22 0734   PROTIME 14.7*   INR 1.15*             Lab 07/08/22  0843   ABO TYPING A   RH TYPING Positive   ANTIBODY SCREEN Negative         Lab 07/09/22  0454   PH, ARTERIAL 7.456*   PCO2, ARTERIAL 69.9*   PO2 ART 64.3*   FIO2 50   HCO3 ART 49.3*   BASE EXCESS ART 21.5*   CARBOXYHEMOGLOBIN 1.2     Brief Urine Lab Results  (Last result in the past 365 days)      Color   Clarity   Blood   Leuk Est   Nitrite   Protein   CREAT   Urine HCG        07/08/22 1208 Yellow   Clear   Negative   Negative   Negative   30 mg/dL (1+)                 Microbiology Results Abnormal     Procedure Component Value - Date/Time    COVID PRE-OP / PRE-PROCEDURE SCREENING ORDER (NO ISOLATION) - Swab, Nasopharynx [[id number]]  (Normal) Collected: 07/08/22 0836    Lab Status: Final result Specimen: Swab from Nasopharynx Updated: 07/08/22 0943    Narrative:      The following orders were created for panel order COVID PRE-OP / PRE-PROCEDURE SCREENING ORDER (NO ISOLATION) - Swab, Nasopharynx.  Procedure                               Abnormality         Status                     ---------                               -----------         ------                     COVID-19, ABBOTT IN-HOUS...[[id number]]  Normal              Final result                  Please view results for these tests on the individual orders.    COVID-19, ABBOTT IN-HOUSE,NASAL Swab (NO TRANSPORT MEDIA) 2 HR TAT - Swab, Nasopharynx [[id number]]  (Normal) Collected: 07/08/22 0836    Lab Status: Final result Specimen: Swab from Nasopharynx Updated: 07/08/22 0943     COVID19 Presumptive Negative    Narrative:      Fact sheet for providers: https://www.fda.gov/media/000388/download     Fact sheet for patients: https://www.fda.gov/media/237969/download    Test performed by PCR.  If inconsistent with clinical signs and symptoms patient should be tested with different authorized molecular test.          XR Femur 2 View Left    Result Date: 7/8/2022  DATE OF EXAM: 7/8/2022 7:36 AM  PROCEDURE: XR FEMUR 2 VW LEFT-  INDICATIONS: LE Injury triage protocol  COMPARISON: No comparisons available.  TECHNIQUE: Two radiographic views of the left femur were obtained.  FINDINGS: There is a comminuted fracture of the intertrochanteric region of the left femur. The greater trochanter is a separate fragment. There is slight varus angulation. The remainder of the femur is intact      Impression: Intertrochanteric left femur fracture  This report was finalized on 7/8/2022 8:11 AM by Eric Middleton.      US Liver    Result Date: 7/8/2022  US LIVER-  Date of Exam: 7/8/2022 3:12 PM  Indication: elevated liver function tests and bilirubin; S72.002A-Fracture of unspecified part of neck of left femur, initial encounter for closed fracture; E87.6-Hypokalemia; Z87.09-Personal history of other diseases of the respiratory system.  Comparison: 06/21/2013  Technique: Transverse and sagittal ultrasound images of the right upper quadrant were obtained. Doppler evaluation was also conducted.  FINDINGS: Visualized portions of the head and body of the pancreas are normal. Evaluation is limited due to surrounding bowel gas.  There is coarsening of the liver with nodularity consistent with cirrhosis. There are no focal hepatic  lesions. Portal venous and hepatic venous flow have normal waveforms and direction.  The gallbladder is surgically absent. The common bile duct is normal at 3 mm.  The right kidney is normal in size with no evidence of hydronephrosis. There is a small echogenic focus consistent with a small stone of the right kidney. The right kidney measures 9.4 x 4.2 x 4.4 cm.      Impression: Diffuse heterogeneity of the liver with coarsening of the liver surface consistent with cirrhosis.  This report was finalized on 7/8/2022 6:14 PM by Cy Juarez MD.      XR Chest 1 View    Result Date: 7/9/2022  FRONTAL VIEW OF THE CHEST CLINICAL INDICATION: Dyspnea. COMPARISON: 7/8/2022. FINDINGS: Increased perihilar fullness bilaterally. Enlarging bilateral pleural effusions compared to prior. No new dense consolidation. No pneumothorax. Stable heart size.     Impression: Worsening perihilar edema pattern and enlarging small pleural effusions bilaterally. Electronically signed by:  Miller Carreon M.D.  7/9/2022 4:53 AM Mountain Time    XR Chest 1 View    Result Date: 7/8/2022  DATE OF EXAM: 7/8/2022 7:45 AM  PROCEDURE: XR CHEST 1 VW-  INDICATIONS: Lower extremity injury  COMPARISON: 07/06/2022  TECHNIQUE: Single radiographic AP view of the chest was obtained.  FINDINGS: Heart size normal. Mild prominence of the central pulmonary vasculature. Attenuated vessels in the left upper lobe due to emphysema. Mild increased interstitial markings in the peripheral lungs. Stable bandlike opacity in the right suprahilar region. Mild blunting of the right costophrenic angle and minimal blunting of the left costophrenic angle. Overall no significant change from yesterday's study      Impression: Stable chest. There is stable right suprahilar atelectasis and underlying pulmonary emphysema. There is stable small right larger than left pleural effusions. Mild increased interstitial markings in the peripheral lung zones may indicate an element of mild  interstitial edema  This report was finalized on 7/8/2022 8:15 AM by Eric Middleton.      XR Pelvis 1 or 2 View    Result Date: 7/8/2022  DATE OF EXAM: 7/8/2022 7:35 AM  PROCEDURE: XR PELVIS 1 OR 2 VW-  INDICATIONS: fall  COMPARISON: No comparisons available.  TECHNIQUE: An AP radiologic view of the pelvis was obtained.  FINDINGS: Fracture of the intertrochanteric region of the left femur. Greater trochanter is a separate fragment. Femoral head is not dislocated. There has been previous right hip hemiarthroplasty. No fracture of the pelvis is demonstrated. There is a lumbosacral fusion      Impression: Intertrochanteric left femur fracture  This report was finalized on 7/8/2022 8:12 AM by Eric Middleton.      FICB    Result Date: 7/8/2022  Haley Foster CRNA     7/8/2022 10:42 AM FICB Patient reassessed immediately prior to procedure Patient location during procedure: pre-op Reason for block: procedure for pain and at surgeon's request Performed by AWAIS/CAA: Xavier Kennedy CRNA Assisted by: Caitlin Aranda RN Preanesthetic Checklist Completed: patient identified, IV checked, site marked, risks and benefits discussed, surgical consent, monitors and equipment checked, pre-op evaluation and timeout performed Prep: Pt Position: supine Sterile barriers:cap, gloves and mask Prep: ChloraPrep Patient monitoring: blood pressure monitoring, continuous pulse oximetry and EKG Procedure Performed under: local infiltration Guidance:ultrasound guided Images:still images obtained, printed/placed on chart Laterality:left Block Type:fascia iliaca compartment Injection Technique:catheter Needle Type:echogenic Needle Gauge:18 G Resistance on Injection: none Catheter Size:20 G (20g) Cath Depth at skin: 11 cm Medications Used: ropivacaine (NAROPIN) 0.5 % injection, 25 mL Med administered at 7/8/2022 10:35 AM Medications Preservative Free Saline:25ml Post Assessment Injection Assessment: negative aspiration for heme, no paresthesia on  injection and incremental injection Patient Tolerance:comfortable throughout block Complications:no Additional Notes Procedure:         Pt placed in supine position.   The insertion site was prepped in sterile fashion with Chlorapreop and clear plastic drapes.  Analgesia was provided by skin infiltration at insertion site with Lidocaine 1% 3mls.  A B-Tyson 18 g , 4 inch echogenic Touhy needle was advance In-plane under ultrasound guidance. The   Anterior superior Iliac crest was initially visualized and the probe was directed slightly medially and slightly towards the umbilicus.  The course of the needle was tracked over the sartorius muscle through the fascia Iliacus and into the anterior portion of the Iliacus muscle.  Major vessels where identified and avoided as where structures of the peritoneal cavity.  LA injection was made incrementally in 1-5ml amounts spread was visualized superiorly below fascia iliacus.  Injection was completed with negative aspiration of blood and negative intravascular injection.  Injection pressures where normal or minimal resistance.  A 20 g B-Tyson wire styleted catheter was then advance thru the needle and very easily placed in a superior or cephalad direction.  The catheter was secured at insertion site with exofin tissue adhesive, benzoin, and steristreps.  A CHG tegaderm dressing was placed over the insertion site and the nerve catheter labeled and capped.  Thank You.       Results for orders placed during the hospital encounter of 05/12/22    Adult Transthoracic Echo Complete W/ Cont if Necessary Per Protocol    Interpretation Summary  · There is a large left pleural effusion.  · Estimated right ventricular systolic pressure from tricuspid regurgitation is moderately elevated (45-55 mmHg). Calculated right ventricular systolic pressure from tricuspid regurgitation is 45 mmHg.  · Left ventricular ejection fraction appears to be 61 - 65%.  · There is moderate plaque in the  descending aorta present.  · Left ventricular diastolic dysfunction is noted.      I have reviewed the medications:  Scheduled Meds:acetaminophen, 650 mg, Oral, Q8H  amLODIPine, 5 mg, Oral, Daily  busPIRone, 10 mg, Oral, TID  ceFAZolin, 2 g, Intravenous, 30 Min Pre-Op  cefepime, 2 g, Intravenous, Once  cefepime, 2 g, Intravenous, Q8H  doxycycline, 100 mg, Intravenous, Q12H  famotidine, 10 mg, Oral, BID AC  ipratropium-albuterol, 3 mL, Nebulization, Q8H - RT  lactobacillus acidophilus, 1 capsule, Oral, Daily  levETIRAcetam, 500 mg, Intravenous, Q12H  metoprolol succinate XL, 25 mg, Oral, Daily  PARoxetine, 20 mg, Oral, Daily  senna-docusate sodium, 2 tablet, Oral, BID  sodium chloride, 10 mL, Intravenous, Q12H  tranexamic acid, 1,000 mg, Intravenous, Once  tranexamic acid, 1,000 mg, Intravenous, Once      Continuous Infusions:Pharmacy Consult - Pharmacy to dose,   ropivacaine,       PRN Meds:.•  acetaminophen  •  senna-docusate sodium **AND** polyethylene glycol **AND** bisacodyl **AND** bisacodyl  •  calcium carbonate  •  HYDROcodone-acetaminophen  •  ipratropium-albuterol  •  Morphine **AND** naloxone  •  Pharmacy Consult - Pharmacy to dose  •  potassium chloride **OR** potassium chloride **OR** potassium chloride  •  potassium chloride **OR** potassium chloride **OR** potassium chloride  •  sodium chloride  •  sodium chloride    Assessment & Plan   Assessment & Plan     Active Hospital Problems    Diagnosis  POA   • **Closed left hip fracture, initial encounter (HCC) [S72.002A]  Yes   • Thrombocytopenia (HCC) [D69.6]  Yes   • Hypokalemia [E87.6]  Yes   • Elevated liver enzymes [R74.8]  Yes   • Seizure (HCC) [R56.9]  Yes   • Acute on chronic respiratory failure with hypoxia and hypercapnia (HCC) [J96.21, J96.22]  Yes   • Elevated procalcitonin [R79.89]  Yes   • COPD with hypoxia (HCC) [J44.9, R09.02]  Yes   • CAP (community acquired pneumonia) [J18.9]  Yes   • Essential hypertension [I10]  Yes      Resolved  Hospital Problems   No resolved problems to display.        Brief Hospital Course to date:  Sierra Saba is a 81 y.o. female with COPD, HTN, seizure disorder who presented from her SNF after a fall and was found to have left intertrochanteric hip fracture as well as hypokalemia, thrombocytopenia and elevated LFTs.  She was being treated with levaquin for possible pneumonia.  She was transfused 2 units platelets in preparation for surgery and had a respiratory decompensation with concern for volume overload, possible sepsis.    This patient's problems and plans were partially entered by my partner and updated as appropriate by me 07/09/22.    Left intertrochanteric femur fracture   - Imaging with intertrochanteric left femur fracture   - Dr. Ying following  - Block in place  - Continue PRN pain control  - Due to respiratory decompensation overnight she is not medically stable for surgery today.  Discussed with Dr. Ying and will reschedule for tomorrow am tentatively.    Acute on chronic mixed respiratory failure  Suspected volume overload after platelet transfusion/acute on chronic diastolic CHF  Possible sepsis due to pneumonia  - Blood cultures, sputum culture pending  - Start cefepime, doxycycline (penicillin allergy)-antibiotics to start before blood culture collection due to difficulty collecting blood cultures  - Continue BIPAP, wean as tolerated.  Hopefully we can get her out of restraints today if we can wean off the BIPAP.  Every trial out of restraints she has taken the BIPAP mask off and desaturated to the 60s.  - Diurese with IV lasix today  - Recent echo with normal LVEF, diastolic dysfunction      Hypokalemia   - Continue replacement as needed     Thrombocytopenia   - Possibly reactive related to infection/consumption from trauma  - Resolved s/p 2 units platelets. Peripheral smear pending     Elevated LFTs  - Obtain liver US     HTN   - Continue home amlodipine and metoprolol      COPD  - On 2-3L  home O2.   - Continue home inhalers     Seizure disorder  - Change keppra to IV until she is stable enough to take PO reliably    Expected Discharge Location and Transportation: SNF  Expected Discharge Date: 7/15    DVT prophylaxis:  Mechanical DVT prophylaxis orders are present.     AM-PAC 6 Clicks Score (PT): 6 (07/08/22 1415)    CODE STATUS:   Code Status and Medical Interventions:   Ordered at: 07/08/22 1040     Medical Intervention Limits:    NO intubation (DNI)     Level Of Support Discussed With:    Patient     Code Status (Patient has no pulse and is not breathing):    No CPR (Do Not Attempt to Resuscitate)     Medical Interventions (Patient has pulse or is breathing):    Limited Support       Bettie Cook MD  07/09/22

## 2022-07-09 NOTE — PROGRESS NOTES
Chief Complaint: Left displaced intertrochanteric hip fracture, left hip pain    Subjective:  I spoke with Dr. Cook in the medical team.  They are working to medically optimize her.  She had low platelets that have responded.  She had hypokalemia which was treated.  Unfortunately she is on BiPAP they are working on addressing volume and team recommends holding off on surgery till tomorrow which I think is very reasonable.    She has significant medical comorbidities at baseline and will be medically optimized but still remains very complex.    PHYSICAL THERAPY PROGRESS          Medications/Allergies:  Scheduled Meds:acetaminophen, 650 mg, Oral, Q8H  amLODIPine, 5 mg, Oral, Daily  busPIRone, 10 mg, Oral, TID  ceFAZolin, 2 g, Intravenous, 30 Min Pre-Op  cefepime, 2 g, Intravenous, Once  cefepime, 2 g, Intravenous, Q8H  doxycycline, 100 mg, Intravenous, Q12H  famotidine, 10 mg, Oral, BID AC  ipratropium-albuterol, 3 mL, Nebulization, Q8H - RT  lactobacillus acidophilus, 1 capsule, Oral, Daily  levETIRAcetam, 500 mg, Intravenous, Q12H  metoprolol succinate XL, 25 mg, Oral, Daily  PARoxetine, 20 mg, Oral, Daily  senna-docusate sodium, 2 tablet, Oral, BID  sodium chloride, 10 mL, Intravenous, Q12H  tranexamic acid, 1,000 mg, Intravenous, Once  tranexamic acid, 1,000 mg, Intravenous, Once      Continuous Infusions:Pharmacy Consult - Pharmacy to dose,   ropivacaine,       PRN Meds:.•  acetaminophen  •  senna-docusate sodium **AND** polyethylene glycol **AND** bisacodyl **AND** bisacodyl  •  calcium carbonate  •  HYDROcodone-acetaminophen  •  ipratropium-albuterol  •  Morphine **AND** naloxone  •  Pharmacy Consult - Pharmacy to dose  •  potassium chloride **OR** potassium chloride **OR** potassium chloride  •  potassium chloride **OR** potassium chloride **OR** potassium chloride  •  sodium chloride  •  sodium chloride  Adhesive tape, Ciprofloxacin, Zithromax [azithromycin], Sulfa antibiotics, Temazepam, and  Penicillins      Objective:  Vital Signs   Temp:  [97 °F (36.1 °C)-98.9 °F (37.2 °C)] 97.5 °F (36.4 °C)  Heart Rate:  [] 93  Resp:  [15-26] 20  BP: (114-194)/() 129/77    Results Review:   I reviewed the patient's new clinical results.  Results from last 7 days   Lab Units 07/09/22  0514   WBC 10*3/mm3 12.31*   HEMOGLOBIN g/dL 11.6*   HEMATOCRIT % 36.3   PLATELETS 10*3/mm3 207     Results from last 7 days   Lab Units 07/09/22  0514   SODIUM mmol/L 148*   POTASSIUM mmol/L 4.2   CHLORIDE mmol/L 96*   CO2 mmol/L 43.0*   BUN mg/dL 25*   CREATININE mg/dL 0.69   GLUCOSE mg/dL 139*   CALCIUM mg/dL 9.4     Results from last 7 days   Lab Units 07/08/22  0734   INR  1.15*     Results from last 7 days   Lab Units 07/09/22  0514   SODIUM mmol/L 148*   POTASSIUM mmol/L 4.2   CHLORIDE mmol/L 96*   CO2 mmol/L 43.0*   BUN mg/dL 25*   CREATININE mg/dL 0.69   CALCIUM mg/dL 9.4   ALK PHOS U/L 129*   ALT (SGPT) U/L 187*   AST (SGOT) U/L 102*   GLUCOSE mg/dL 139*       XR Femur 2 View Left    Result Date: 7/8/2022  DATE OF EXAM: 7/8/2022 7:36 AM  PROCEDURE: XR FEMUR 2 VW LEFT-  INDICATIONS: LE Injury triage protocol  COMPARISON: No comparisons available.  TECHNIQUE: Two radiographic views of the left femur were obtained.  FINDINGS: There is a comminuted fracture of the intertrochanteric region of the left femur. The greater trochanter is a separate fragment. There is slight varus angulation. The remainder of the femur is intact      Intertrochanteric left femur fracture  This report was finalized on 7/8/2022 8:11 AM by Eric Middleton.      XR Pelvis 1 or 2 View    Result Date: 7/8/2022  DATE OF EXAM: 7/8/2022 7:35 AM  PROCEDURE: XR PELVIS 1 OR 2 VW-  INDICATIONS: fall  COMPARISON: No comparisons available.  TECHNIQUE: An AP radiologic view of the pelvis was obtained.  FINDINGS: Fracture of the intertrochanteric region of the left femur. Greater trochanter is a separate fragment. Femoral head is not dislocated. There has been  previous right hip hemiarthroplasty. No fracture of the pelvis is demonstrated. There is a lumbosacral fusion        Physical Exam:  General: On BiPAP  Vascular: digits are pink and well perfused  Operative Hip Exam:   Neurologic: EHL/FHL/TA/GCS intact  Left hip shortened and externally rotated        Closed left hip fracture, initial encounter (HCC)    Essential hypertension    COPD with hypoxia (HCC)    CAP (community acquired pneumonia)    Acute on chronic respiratory failure with hypoxia and hypercapnia (HCC)    Elevated procalcitonin    Thrombocytopenia (HCC)    Hypokalemia    Elevated liver enzymes    Seizure (HCC)      Assessment/Plan:    Disposition: Plan is for surgery moved from 7 9-7 10 to allow her to be medically optimized discussed with anesthesia and medical team      POD #: Plan is for surgery on 7/10/2022    Procedure: Hip intramedullary nail for hip fracture-plan procedure for left hip    Anemia - acute blood loss anemia following hip fracture    Pain control - block per anesthesia    Dressing - N/A    Antibiotics -medical team    Diet - per primary team okay to have a diet and then n.p.o. after midnight for surgery on 7/10/2022    Activity -   Nonweightbearing  DVT Prophylaxis - SCDs until surgery  PT/OT Consult - nonweightbearing left lower extremity    Osteoporotic hip fracture - I recommend informing the patient's PCP regarding osteoporotic fracture/consideration of enrolling patient in osteoporotic care program postoperatively      Follow-up - to be determined     I discussed the patient's findings and my recommendations with patient    No future appointments.        Flip Ying MD, FAAOS  Orthopedic Surgeon  [facility]  Orthopedics and Sports Medicine  [address] [place] [address]. [address]  [place] [address]      Flip Ying MD  07/09/22  11:05 EDT

## 2022-07-09 NOTE — CONSULTS
Subjective     PROBLEM LIST:  Patient Active Problem List   Diagnosis   • Cough   • Fall   • Acute kidney injury (HCC)   • Essential hypertension   • COPD with hypoxia (HCC)   • Lung blebs (HCC)   • Left renal mass   • CAP (community acquired pneumonia)   • GERD without esophagitis   • KIM (generalized anxiety disorder)   • Hyperglycemia   • Pneumonia of both lower lobes   • Severe malnutrition (CMS/HCC)   • Acute on chronic respiratory failure with hypoxia and hypercapnia (HCC)   • Elevated procalcitonin   • Closed left hip fracture, initial encounter ([facility])   • Thrombocytopenia (HCC)   • Hypokalemia   • Elevated liver enzymes   • Seizure ([facility])         CHIEF COMPLAINT: thrombocytopenia    HISTORY OF PRESENT ILLNESS:  The patient is a 81 y.o. female, referred  for evaluation of a low platelet count.    She had a fall after leaving the bathroom and has a hip fracture.  She has been at a nursing home recovering from a recent episode of pneumonia at the end of May.  She has a history of COPD and is on chronic steroids, O2.      Noted to have a low platelet count which is new onset compared to labs within the last few months.  She has been on levaquin prior to admission.  Overnight she required bipap for hypoxia.  This morning still on bipap, in restraints due to taking her mask off.    Platelets were normal in the 300s during admission here in May.    She received a platelet transfusion overnight with platelets increasing from 58-> 141 early this am, and up to 207 this morning.    REVIEW OF SYSTEMS:  A 14 point review of systems was performed and is negative except as noted above.    Past Medical History:   Diagnosis Date   • Anxiety    • COPD (chronic obstructive pulmonary disease) (HCC)    • Hypertension    • Seizures ([facility])        No current facility-administered medications on file prior to encounter.     Current Outpatient Medications on File Prior to Encounter   Medication Sig Dispense Refill   • acetaminophen  (TYLENOL) 325 MG tablet Take 650 mg by mouth Every 4 (Four) Hours As Needed for Mild Pain .     • amLODIPine (NORVASC) 5 MG tablet Take 5 mg by mouth Daily.     • busPIRone (BUSPAR) 10 MG tablet Take 10 mg by mouth 3 (Three) Times a Day.     • diphenhydrAMINE-acetaminophen (TYLENOL PM)  MG tablet per tablet Take 1 tablet by mouth At Night As Needed for Sleep.     • famotidine (PEPCID) 10 MG tablet Take 10 mg by mouth 2 (Two) Times a Day.     • ipratropium-albuterol (DUO-NEB) 0.5-2.5 mg/3 ml nebulizer Take 3 mL by nebulization Every 4 (Four) Hours As Needed for Wheezing or Shortness of Air. 360 mL    • ipratropium-albuterol (DUO-NEB) 0.5-2.5 mg/3 ml nebulizer Take 3 mL by nebulization Every 4 (Four) Hours. 360 mL    • lactobacillus acidophilus (RISAQUAD) capsule capsule Take 1 capsule by mouth Daily.     • levETIRAcetam (KEPPRA) 500 MG tablet Take 500 mg by mouth Every 12 (Twelve) Hours.     • LORazepam (ATIVAN) 1 MG tablet Take 1 mg by mouth Every 8 (Eight) Hours As Needed for Anxiety.     • metoprolol succinate XL (TOPROL-XL) 25 MG 24 hr tablet Take 25 mg by mouth Daily.     • O2 (OXYGEN) Inhale 2 L/min Continuous.     • PARoxetine (PAXIL) 20 MG tablet Take 20 mg by mouth Daily.     • potassium chloride 10 MEQ CR tablet Take 1 tablet by mouth Daily. 7 tablet 0   • predniSONE (DELTASONE) 10 MG tablet Take 3 tablets by mouth Daily With Breakfast.     • sennosides-docusate (PERICOLACE) 8.6-50 MG per tablet Take 2 tablets by mouth 2 (Two) Times a Day.     • umeclidinium-vilanterol (Anoro Ellipta) 62.5-25 MCG/INH aerosol powder  inhaler Inhale 1 puff Daily.     • levoFLOXacin (LEVAQUIN) 500 MG tablet Take 500 mg by mouth Daily.         Allergies   Allergen Reactions   • Adhesive Tape Other (See Comments)     Redness and fluuid filled blister under cardio monitor electrodes.    • Ciprofloxacin Other (See Comments)     PT STATES IT MAKES HER CRAZY   • Zithromax [Azithromycin] Other (See Comments)     PT STATES IT  MAKES HER CRAZY    • Sulfa Antibiotics Unknown - Low Severity   • Temazepam Unknown - Low Severity   • Penicillins Rash       Past Surgical History:   Procedure Laterality Date   • BACK SURGERY     • CHEST TUBE INSERTION Left 10/20/2021   • CHOLECYSTECTOMY     • HYSTERECTOMY     • TOTAL HIP ARTHROPLASTY             Social History     Socioeconomic History   • Marital status:    Tobacco Use   • Smoking status: Former Smoker     Years: 41.25     Types: Cigarettes     Quit date: 2012     Years since quitting: 10.5   • Smokeless tobacco: Never Used   Substance and Sexual Activity   • Alcohol use: Not Currently   • Drug use: Never   • Sexual activity: Not Currently       Family History   Problem Relation Age of Onset   • Hypertension Mother    • Diabetes Father        Objective     Vitals:    07/09/22 0715 07/09/22 0717 07/09/22 0809 07/09/22 0915   BP: 138/78 138/78  129/77   BP Location:  Left arm     Patient Position:  Lying     Pulse: 94 93  93   Resp: 15 20     Temp:  97.5 °F (36.4 °C)     TempSrc:  Axillary     SpO2: 92% 92% 93% 95%   Weight:       Height:                       Performance Status: 4  General:  female in no acute distress  Neuro: alert, calm  HEENT: sclerae anicteric, oropharynx clear  Lymphatics: no cervical, supraclavicular, or axillary adenopathy  Cardiovascular: regular rate and rhythm, no murmurs  Lungs: clear to auscultation bilaterally  Abdomen: soft, nontender, nondistended.  No palpable organomegaly  Extremities: no lower extremity edema  Skin: few bruises on forearms  Psych: mood and affect appropriate    Lab Results   Component Value Date    WBC 12.31 (H) 07/09/2022    HGB 11.6 (L) 07/09/2022    HCT 36.3 07/09/2022    MCV 95.5 07/09/2022     07/09/2022     Lab Results   Component Value Date    GLUCOSE 139 (H) 07/09/2022    BUN 25 (H) 07/09/2022    CREATININE 0.69 07/09/2022    EGFRIFNONA 96 02/26/2022    BCR 36.2 (H) 07/09/2022    K 4.2 07/09/2022    CO2 43.0 (H) 07/09/2022     CALCIUM 9.4 07/09/2022    ALBUMIN 3.80 07/09/2022     (H) 07/09/2022     (H) 07/09/2022     XR Chest 1 View  Narrative: FRONTAL VIEW OF THE CHEST    CLINICAL INDICATION: Dyspnea.    COMPARISON: 7/8/2022.    FINDINGS: Increased perihilar fullness bilaterally. Enlarging bilateral pleural effusions compared to prior. No new dense consolidation. No pneumothorax. Stable heart size.  Impression: Worsening perihilar edema pattern and enlarging small pleural effusions bilaterally.    Electronically signed by:  Miller Carreon M.D.    7/9/2022 4:53 AM Mountain Time          Assessment & Plan     Sierra Saba is a 81 y.o. female admitted after a fall and a hip fracture.  Found to have new onset thrombocytopenia.    She had a normal platelet count during recent admission in May.  She reportedly was on antibiotics for pneumonia at her facility prior to admission.  Platelets responded appropriately to transfusion overnight, with further increase in platelets on this morning's labs.  No lab evidence to suggest DIC.  Possible transient drop in platelets due to medications or acute illness.    Recommend continuing to monitor.  Avoid antibiotics unless necessary.    Otherwise her surgery has been delayed today due to respiratory failure overnight.  She is still requiring BiPAP at this time.    I will follow her platelets via the chart and reevaluate if they drop again.           Faye Chase MD    7/9/2022

## 2022-07-09 NOTE — PLAN OF CARE
Goal Outcome Evaluation:  Plan of Care Reviewed With: patient  Progress: no change  Pt. Has not complained of much pain today, infublock in place. Pt. Remains on Bipap to keep up O2 stats. Attempted to put on 6L NC, but stats dropped to the 80s. She has been disoriented to time and situation throughout the day. Surgery planned for tomorrow if pt. Improves. Waffle mattress in place and we are turning her. Pt. Is still on soft wrist restraints and has not met DC criteria. Will continue to monitor.

## 2022-07-10 NOTE — ANESTHESIA PROCEDURE NOTES
Airway  Urgency: elective    Date/Time: 7/10/2022 3:03 PM  Airway not difficult    General Information and Staff    Patient location during procedure: OR  CRNA/CAA: Robby Robles CRNA    Indications and Patient Condition  Indications for airway management: airway protection    Preoxygenated: yes  Mask difficulty assessment: 1 - vent by mask    Final Airway Details  Final airway type: supraglottic airway      Successful airway: I-gel  Size 3    Number of attempts at approach: 1  Assessment: lips, teeth, and gum same as pre-op    Additional Comments  LMA placed without difficulty, ventilation with assist, equal breath sounds and symmetric chest rise and fall

## 2022-07-10 NOTE — PROGRESS NOTES
Subjective     PROBLEM LIST:  Patient Active Problem List   Diagnosis   • Cough   • Fall   • Acute kidney injury (HCC)   • Essential hypertension   • COPD with hypoxia (HCC)   • Lung blebs (HCC)   • Left renal mass   • CAP (community acquired pneumonia)   • GERD without esophagitis   • KIM (generalized anxiety disorder)   • Hyperglycemia   • Pneumonia of both lower lobes   • Severe malnutrition (CMS/HCC)   • Acute on chronic respiratory failure with hypoxia and hypercapnia (HCC)   • Elevated procalcitonin   • Closed left hip fracture, initial encounter ([facility])   • Thrombocytopenia (HCC)   • Hypokalemia   • Elevated liver enzymes   • Seizure (HCC)         INTERVAL HISTORY: Remains on BiPAP.  Patient has no complaints today.      REVIEW OF SYSTEMS:  A 14 point review of systems was performed and is negative except as noted above.      Objective     Vitals:    07/10/22 0333 07/10/22 0804 07/10/22 0850 07/10/22 0952   BP: 154/95  145/98 138/81   BP Location:   Left arm    Patient Position:   Lying    Pulse: 106 115 (!) 122 (!) 125   Resp:  28 28    Temp:   97.7 °F (36.5 °C)    TempSrc:   Axillary    SpO2: 96% 91% 92%    Weight:       Height:                       Performance Status: 4  General:  female in no acute distress  Neuro: alert, calm  HEENT: sclerae anicteric, oropharynx clear  Lymphatics: no cervical, supraclavicular, or axillary adenopathy  Cardiovascular: regular rate and rhythm, no murmurs  Lungs: clear to auscultation bilaterally  Abdomen: soft, nontender, nondistended.  No palpable organomegaly  Extremities: no lower extremity edema  Skin: few bruises on forearms  Psych: mood and affect appropriate    I have reexamined the patient and the results are consistent with the previously documented exam. Faye Chase MD      Labs:       Lab Results   Component Value Date    WBC 3.65 07/10/2022    HGB 10.6 (L) 07/10/2022    HCT 31.4 (L) 07/10/2022    MCV 92.4 07/10/2022    PLT 87 (L) 07/10/2022     Lab Results    Component Value Date    GLUCOSE 175 (H) 07/10/2022    BUN 35 (H) 07/10/2022    CREATININE 0.82 07/10/2022    EGFRIFNONA 96 02/26/2022    BCR 42.7 (H) 07/10/2022    K 2.8 (L) 07/10/2022    CO2 44.0 (H) 07/10/2022    CALCIUM 9.6 07/10/2022    ALBUMIN 3.20 (L) 07/10/2022     (H) 07/10/2022    ALT 1,278 (H) 07/10/2022     XR Chest 1 View  Narrative: DATE OF EXAM: 7/9/2022 6:50 PM     PROCEDURE: XR CHEST 1 VW-     INDICATIONS: Evaluate pulmonary infiltrate; S72.002A-Fracture of  unspecified part of neck of left femur, initial encounter for closed  fracture; E87.6-Hypokalemia; Z87.09-Personal history of other diseases  of the respiratory system.      COMPARISON: Chest x-ray 7/9/2022 5:06 AM     TECHNIQUE: Single frontal view of the chest.     FINDINGS:  Stable cardiomediastinal silhouette within normal limits. Stable small  right greater than left bilateral pleural effusions. Mild improvement in  interstitial edema. No evidence of new or worsening airspace disease. No  pneumothorax. No acute osseous findings.     Impression: Mildly improved appearance of pulmonary edema.     This report was finalized on 7/9/2022 9:49 PM by Chuck Lomeli MD.     XR Chest 1 View  Narrative: FRONTAL VIEW OF THE CHEST    CLINICAL INDICATION: Dyspnea.    COMPARISON: 7/8/2022.    FINDINGS: Increased perihilar fullness bilaterally. Enlarging bilateral pleural effusions compared to prior. No new dense consolidation. No pneumothorax. Stable heart size.  Impression: Worsening perihilar edema pattern and enlarging small pleural effusions bilaterally.    Electronically signed by:  Miller Carreon M.D.    7/9/2022 4:53 AM Mountain Time                Assessment & Plan     Sierra Saba is a 81 y.o. year old female admitted after a fall and a hip fracture.  Found to have new onset thrombocytopenia.     She had a normal platelet count during recent admission in May.    She had an appropriate response to transfusion, but platelets have dropped  down to 80,000 today.  Also significant elevation of liver enzymes concerning for acute liver injury.  Possible hypotensive event.  Remains dependent on BiPAP for oxygenation.    Will check DIC labs.  Low platelets may be related to acute illness, possibly infectious or hypoperfusion.  Would continue to transfuse as needed, either for goal of greater than 100 for surgical procedure, or greater than 20 otherwise.     We will continue to monitor.           Faye Chase MD  7/10/2022

## 2022-07-10 NOTE — ANESTHESIA PREPROCEDURE EVALUATION
Anesthesia Evaluation     Patient summary reviewed and Nursing notes reviewed   NPO Solid Status: > 8 hours  NPO Liquid Status: > 8 hours           Airway   Mallampati: II  TM distance: >3 FB  Neck ROM: full  No difficulty expected  Dental      Pulmonary    (+) decreased breath sounds,   Cardiovascular   Exercise tolerance: poor (<4 METS)    Rhythm: regular  Rate: normal    (+) CHF Systolic <55%,       Neuro/Psych  GI/Hepatic/Renal/Endo      Musculoskeletal     Abdominal    Substance History      OB/GYN          Other        ROS/Med Hx Other: Hx of acute on chronic respiratory disease  cxr yesterday shows pulmonary congestion but improving from older cxrs    plt count noted     lfts noted and discussed with internal medicine  inr noted   k 2.8 noted this is being corrected                  Anesthesia Plan    ASA 4     general     intravenous induction     Anesthetic plan, risks, benefits, and alternatives have been provided, discussed and informed consent has been obtained with: patient and child.        CODE STATUS:    While under anesthesia and immediate perioperative period:  Code Status: CPR - Full Support

## 2022-07-10 NOTE — PROGRESS NOTES
[facility] Medicine Services  PROGRESS NOTE    Patient Name: Sierra Saba  : 1940  MRN: [id number]    Date of Admission: 2022  Primary Care Physician: Belinda King DO    Subjective   Subjective     CC:  F/U hip fracture    HPI:  Respiratory status improving.  Still requiring BIPAP this morning but now on a mask at 3L/minute.  She reports left hip pain.  Denies abdominal pain.    ROS:  Gen- No fevers  Resp- + cough, mild dyspnea  GI- No N/V/D, abd pain      Objective   Objective     Vital Signs:   Temp:  [97.3 °F (36.3 °C)-97.7 °F (36.5 °C)] 97.7 °F (36.5 °C)  Heart Rate:  [] 125  Resp:  [14-28] 28  BP: (117-154)/(64-98) 138/81  Flow (L/min):  [6-17] 6     Physical Exam:  Constitutional: No acute distress, awake, alert, sitting up in bed, chronically ill appearing  HENT: NCAT, mucous membranes moist  Respiratory: Clear to auscultation bilaterally, respiratory effort normal on 3L via facemask  Cardiovascular: RRR, no murmurs, rubs, or gallops  Gastrointestinal: Soft, nontender, nondistended  Musculoskeletal: 1+ LLE edema  Psychiatric: Appropriate affect, cooperative  Neurologic: Speech clear and fluent  Skin: Multiple bruises    Results Reviewed:  LAB RESULTS:      Lab 07/10/22  0757 22  1024 22  0635 22  0514 22  0141 22  1543 22  0734 22  0113   WBC 3.65  --   --  12.31* 6.74  --  9.70 10.28   HEMOGLOBIN 10.6*  --   --  11.6* 10.3*  --  13.3 13.4   HEMATOCRIT 31.4*  --   --  36.3 32.3*  --  40.4 39.6   PLATELETS 87*  --   --  207 141 34* 58* 72*   NEUTROS ABS 1.72  --   --  7.51* 4.17  --  5.99 7.71*   IMMATURE GRANS (ABS) 0.23*  --   --   --  0.51*  --  0.73*  --    LYMPHS ABS 1.48  --   --   --  1.63  --  2.41  --    MONOS ABS 0.20  --   --   --  0.39  --  0.49  --    EOS ABS 0.01  --   --  0.12 0.01  --  0.02 0.00   MCV 92.4  --   --  95.5 95.6  --  95.1 91.9   PROCALCITONIN  --   --   --  5.29*  --   --   --   --    LACTATE   --  2.0 2.3* 2.4*  --   --   --   --    PROTIME  --   --   --   --   --   --  14.7*  --          Lab 07/10/22  0757 07/10/22  0011 07/09/22  0514 07/08/22  1543 07/08/22  0734 07/06/22  0113   SODIUM 150* 148* 148* 142 143 141   POTASSIUM 2.8* 3.2* 4.2 2.4* 2.5* 2.4*   CHLORIDE 94* 93* 96* 91* 88* 89*   CO2 44.0* 42.0* 43.0* 43.0* 44.0* 42.0*   ANION GAP 12.0 13.0 9.0 8.0 11.0 10.0   BUN 35* 35* 25* 22 22 23   CREATININE 0.82 0.73 0.69 0.61 0.69 0.63   EGFR 72.4 83.3 87.9 90.5 87.9 89.8   GLUCOSE 175* 196* 139* 145* 190* 215*   CALCIUM 9.6 9.1 9.4 8.7 9.4 8.7   MAGNESIUM  --   --  2.2  --   --  2.0         Lab 07/10/22  0757 07/09/22  0514 07/08/22  0734 07/06/22  0113 07/05/22  1836   TOTAL PROTEIN 5.6* 6.3 5.8* 5.4* 5.6*   ALBUMIN 3.20* 3.80 3.70 3.40* 3.50   GLOBULIN 2.4  --  2.1 2.0 2.1   ALT (SGPT) 1,278* 187* 193* 213* 225*   AST (SGOT) 622* 102* 96* 84* 94*   BILIRUBIN 1.8* 1.7* 1.5* 1.0 1.1   INDIRECT BILIRUBIN  --  0.8  --   --   --    BILIRUBIN DIRECT  --  0.9*  --   --   --    ALK PHOS 125* 129* 125* 102 103         Lab 07/08/22  0734   PROTIME 14.7*   INR 1.15*             Lab 07/08/22  0843   ABO TYPING A   RH TYPING Positive   ANTIBODY SCREEN Negative         Lab 07/10/22  0917 07/09/22  0454   PH, ARTERIAL 7.629* 7.456*   PCO2, ARTERIAL 49.9* 69.9*   PO2 ART 55.9* 64.3*   FIO2 35 50   HCO3 ART 52.3* 49.3*   BASE EXCESS ART 27.8* 21.5*   CARBOXYHEMOGLOBIN 1.2 1.2     Brief Urine Lab Results  (Last result in the past 365 days)      Color   Clarity   Blood   Leuk Est   Nitrite   Protein   CREAT   Urine HCG        07/08/22 1208 Yellow   Clear   Negative   Negative   Negative   30 mg/dL (1+)                 Microbiology Results Abnormal     Procedure Component Value - Date/Time    COVID PRE-OP / PRE-PROCEDURE SCREENING ORDER (NO ISOLATION) - Swab, Nasopharynx [[id number]]  (Normal) Collected: 07/08/22 0836    Lab Status: Final result Specimen: Swab from Nasopharynx Updated: 07/08/22 0943    Narrative:       The following orders were created for panel order COVID PRE-OP / PRE-PROCEDURE SCREENING ORDER (NO ISOLATION) - Swab, Nasopharynx.  Procedure                               Abnormality         Status                     ---------                               -----------         ------                     COVID-19, ABBOTT IN-HOUS...[[id number]]  Normal              Final result                 Please view results for these tests on the individual orders.    COVID-19, ABBOTT IN-HOUSE,NASAL Swab (NO TRANSPORT MEDIA) 2 HR TAT - Swab, Nasopharynx [[id number]]  (Normal) Collected: 07/08/22 0836    Lab Status: Final result Specimen: Swab from Nasopharynx Updated: 07/08/22 0943     COVID19 Presumptive Negative    Narrative:      Fact sheet for providers: https://www.fda.gov/media/503837/download     Fact sheet for patients: https://www.fda.gov/media/885207/download    Test performed by PCR.  If inconsistent with clinical signs and symptoms patient should be tested with different authorized molecular test.          US Liver    Result Date: 7/8/2022  US LIVER-  Date of Exam: 7/8/2022 3:12 PM  Indication: elevated liver function tests and bilirubin; S72.002A-Fracture of unspecified part of neck of left femur, initial encounter for closed fracture; E87.6-Hypokalemia; Z87.09-Personal history of other diseases of the respiratory system.  Comparison: 06/21/2013  Technique: Transverse and sagittal ultrasound images of the right upper quadrant were obtained. Doppler evaluation was also conducted.  FINDINGS: Visualized portions of the head and body of the pancreas are normal. Evaluation is limited due to surrounding bowel gas.  There is coarsening of the liver with nodularity consistent with cirrhosis. There are no focal hepatic lesions. Portal venous and hepatic venous flow have normal waveforms and direction.  The gallbladder is surgically absent. The common bile duct is normal at 3 mm.  The right kidney is normal in size with no  evidence of hydronephrosis. There is a small echogenic focus consistent with a small stone of the right kidney. The right kidney measures 9.4 x 4.2 x 4.4 cm.      Impression: Diffuse heterogeneity of the liver with coarsening of the liver surface consistent with cirrhosis.  This report was finalized on 7/8/2022 6:14 PM by Cy Juarez MD.      XR Chest 1 View    Result Date: 7/9/2022  DATE OF EXAM: 7/9/2022 6:50 PM  PROCEDURE: XR CHEST 1 VW-  INDICATIONS: Evaluate pulmonary infiltrate; S72.002A-Fracture of unspecified part of neck of left femur, initial encounter for closed fracture; E87.6-Hypokalemia; Z87.09-Personal history of other diseases of the respiratory system.  COMPARISON: Chest x-ray 7/9/2022 5:06 AM  TECHNIQUE: Single frontal view of the chest.  FINDINGS: Stable cardiomediastinal silhouette within normal limits. Stable small right greater than left bilateral pleural effusions. Mild improvement in interstitial edema. No evidence of new or worsening airspace disease. No pneumothorax. No acute osseous findings.      Impression: Mildly improved appearance of pulmonary edema.  This report was finalized on 7/9/2022 9:49 PM by Chuck Lomeli MD.      XR Chest 1 View    Result Date: 7/9/2022  FRONTAL VIEW OF THE CHEST CLINICAL INDICATION: Dyspnea. COMPARISON: 7/8/2022. FINDINGS: Increased perihilar fullness bilaterally. Enlarging bilateral pleural effusions compared to prior. No new dense consolidation. No pneumothorax. Stable heart size.     Impression: Worsening perihilar edema pattern and enlarging small pleural effusions bilaterally. Electronically signed by:  Miller Carreon M.D.  7/9/2022 4:53 AM Mountain Time      Results for orders placed during the hospital encounter of 05/12/22    Adult Transthoracic Echo Complete W/ Cont if Necessary Per Protocol    Interpretation Summary  · There is a large left pleural effusion.  · Estimated right ventricular systolic pressure from tricuspid regurgitation is  moderately elevated (45-55 mmHg). Calculated right ventricular systolic pressure from tricuspid regurgitation is 45 mmHg.  · Left ventricular ejection fraction appears to be 61 - 65%.  · There is moderate plaque in the descending aorta present.  · Left ventricular diastolic dysfunction is noted.      I have reviewed the medications:  Scheduled Meds:acetaminophen, 650 mg, Oral, Q8H  amLODIPine, 5 mg, Oral, Daily  busPIRone, 10 mg, Oral, TID  ceFAZolin, 2 g, Intravenous, 30 Min Pre-Op  cefepime, 2 g, Intravenous, Q8H  doxycycline, 100 mg, Intravenous, Q12H  famotidine, 10 mg, Oral, BID AC  ipratropium-albuterol, 3 mL, Nebulization, Q8H - RT  lactobacillus acidophilus, 1 capsule, Oral, Daily  levETIRAcetam, 500 mg, Intravenous, Q12H  metoprolol succinate XL, 25 mg, Oral, Daily  PARoxetine, 20 mg, Oral, Daily  senna-docusate sodium, 2 tablet, Oral, BID  sodium chloride, 10 mL, Intravenous, Q12H  tranexamic acid, 1,000 mg, Intravenous, Once  tranexamic acid, 1,000 mg, Intravenous, Once      Continuous Infusions:Pharmacy Consult - Pharmacy to dose,   ropivacaine,       PRN Meds:.•  acetaminophen  •  senna-docusate sodium **AND** polyethylene glycol **AND** bisacodyl **AND** bisacodyl  •  calcium carbonate  •  HYDROcodone-acetaminophen  •  ipratropium-albuterol  •  Morphine **AND** naloxone  •  Pharmacy Consult - Pharmacy to dose  •  potassium chloride **OR** potassium chloride **OR** potassium chloride  •  potassium chloride **OR** potassium chloride **OR** potassium chloride  •  sodium chloride  •  sodium chloride    Assessment & Plan   Assessment & Plan     Active Hospital Problems    Diagnosis  POA   • **Closed left hip fracture, initial encounter ([facility]) [S72.002A]  Yes   • Thrombocytopenia (HCC) [D69.6]  Yes   • Hypokalemia [E87.6]  Yes   • Elevated liver enzymes [R74.8]  Yes   • Seizure (HCC) [R56.9]  Yes   • Acute on chronic respiratory failure with hypoxia and hypercapnia (HCC) [J96.21, J96.22]  Yes   • Elevated  procalcitonin [R79.89]  Yes   • COPD with hypoxia (HCC) [J44.9, R09.02]  Yes   • CAP (community acquired pneumonia) [J18.9]  Yes   • Essential hypertension [I10]  Yes      Resolved Hospital Problems   No resolved problems to display.        Brief Hospital Course to date:  Sierra Saba is a 81 y.o. female with COPD, HTN, seizure disorder who presented from her SNF after a fall and was found to have left intertrochanteric hip fracture as well as hypokalemia, thrombocytopenia and elevated LFTs.  She was being treated with levaquin for possible pneumonia.  She was transfused 2 units platelets in preparation for surgery and had a respiratory decompensation with concern for volume overload, possible sepsis.    This patient's problems and plans were partially entered by my partner and updated as appropriate by me 07/10/22.    Left intertrochanteric femur fracture   - Imaging with intertrochanteric left femur fracture   - Dr. Ying following  - Block in place  - Continue PRN pain control  - Respiratory status improving.  With her underling comorbidities of COPD and now apparently hepatic cirrhosis, she is higher risk for surgery even on her best day.  Decrease in platelet count today would increase bleeding risk but is better than when she came in.  Without surgery she will lose mobility and pain may be difficult to control.  I discussed with the patient and Dr. Ying.  Patient understands risks but is OK with proceeding with surgery given the potential benefits of surgery.  If anesthesia feels comfortable, we will likely proceed with surgery this afternoon.  Will continue to try to optimize prior to surgery with potassium replacement and monitor her respiratory status closely.     Acute on chronic mixed respiratory failure  Suspected volume overload after platelet transfusion/acute on chronic diastolic CHF  Possible sepsis due to pneumonia  - Blood cultures, sputum culture pending  - Continue cefepime, doxycycline  (penicillin allergy)  - S/P diuresis with IV lasix.  Limited now due to hypernatremia and metabolic alkalosis   - Recent echo with normal LVEF, diastolic dysfunction   - Respiratory status improved significantly, now on 3L     Hypokalemia   - Continue replacement aggressively     Thrombocytopenia   - Possibly reactive related to infection/consumption from trauma, underlying hepatic cirrhosis may be contributing  - Resolved s/p 2 units platelets but now decreased to 87     Elevated LFTs  Hepatic cirrhosis on ultrasound  - Likely ischemic injury related to acute CHF, possibly had a hypotensive episode at some point.  - Acute hepatitis panel is negative  - Continue to monitor LFTs     HTN   - Continue home metoprolol, resume amlodipine as tolerated      COPD  - On 2-3L home O2.   - Continue home inhalers     Seizure disorder  - Change keppra to IV until she is stable enough to take PO reliably    Expected Discharge Location and Transportation: [facility]  Expected Discharge Date: 7/15    DVT prophylaxis:  Mechanical DVT prophylaxis orders are present.     AM-PAC 6 Clicks Score (PT): 6 (07/09/22 0800)    CODE STATUS:   Code Status and Medical Interventions:   Ordered at: 07/08/22 1040     Medical Intervention Limits:    NO intubation (DNI)     Level Of Support Discussed With:    Patient     Code Status (Patient has no pulse and is not breathing):    No CPR (Do Not Attempt to Resuscitate)     Medical Interventions (Patient has pulse or is breathing):    Limited Support       Bettie Cook MD  07/10/22

## 2022-07-10 NOTE — PLAN OF CARE
Goal Outcome Evaluation:  Plan of Care Reviewed With: patient        Progress: no change   VSS, on Bipap currently. Voiding well. Remains in restraints. Turned Q2. Pain controlled with IV meds. Infusystem running as ordered. Slept well.

## 2022-07-10 NOTE — PROGRESS NOTES
Chief Complaint: Left displaced intertrochanteric hip fracture, left hip pain    Subjective:    The patient remains very medically complex.  I reviewed her notes today from specialist internal medicine and anesthesia.  I personally spoken with primary team and anesthesia.  I spoken with the son twice by phone.  He has made it in town now.  She is medically dealing with a lot.  Unfortunately the hip fracture will continue to potentially contribute to her decline so we discussed with multiple team members about proceeding with surgery today.  If she remains stable from a medical perspective we will proceed today with fixation of her left hip.  Her son appears to understand the severity of her ongoing issues which are many at this time.    Her platelets have bumped it down low again as well however 1 to be sure not to overload her with volume so we may build to hold the line for now as this is a trochanteric nail procedure.    PHYSICAL THERAPY PROGRESS          Medications/Allergies:  Scheduled Meds:[MAR Hold] acetaminophen, 650 mg, Oral, Q8H  [MAR Hold] busPIRone, 10 mg, Oral, TID  cefepime, 2 g, Intravenous, Q8H  doxycycline, 100 mg, Intravenous, Q12H  famotidine, 10 mg, Oral, BID AC  [MAR Hold] ipratropium-albuterol, 3 mL, Nebulization, Q8H - RT  [MAR Hold] lactobacillus acidophilus, 1 capsule, Oral, Daily  levETIRAcetam, 500 mg, Intravenous, Q12H  metoprolol succinate XL, 25 mg, Oral, Daily  [MAR Hold] PARoxetine, 20 mg, Oral, Daily  [MAR Hold] senna-docusate sodium, 2 tablet, Oral, BID  [MAR Hold] sodium chloride, 10 mL, Intravenous, Q12H  tranexamic acid, 1,000 mg, Intravenous, Once  tranexamic acid, 1,000 mg, Intravenous, Once      Continuous Infusions:Pharmacy Consult - Pharmacy to dose,   ropivacaine,       PRN Meds:.•  [MAR Hold] acetaminophen  •  [MAR Hold] senna-docusate sodium **AND** [MAR Hold] polyethylene glycol **AND** [MAR Hold] bisacodyl **AND** [MAR Hold] bisacodyl  •  [MAR Hold] calcium  carbonate  •  [MAR Hold] HYDROcodone-acetaminophen  •  [MAR Hold] ipratropium-albuterol  •  [MAR Hold] Morphine **AND** [MAR Hold] naloxone  •  Pharmacy Consult - Pharmacy to dose  •  potassium chloride **OR** potassium chloride **OR** potassium chloride  •  potassium chloride **OR** potassium chloride **OR** potassium chloride  •  [MAR Hold] sodium chloride  •  [MAR Hold] sodium chloride  Adhesive tape, Ciprofloxacin, Zithromax [azithromycin], Sulfa antibiotics, Temazepam, and Penicillins      Objective:  Vital Signs   Temp:  [97.6 °F (36.4 °C)-98.8 °F (37.1 °C)] 97.7 °F (36.5 °C)  Heart Rate:  [101-125] 109  Resp:  [12-28] 12  BP: (117-154)/(64-98) 132/73    Results Review:   I reviewed the patient's new clinical results.  Results from last 7 days   Lab Units 07/10/22  1142   WBC 10*3/mm3 4.06   HEMOGLOBIN g/dL 10.2*   HEMATOCRIT % 31.6*   PLATELETS 10*3/mm3 89*     Results from last 7 days   Lab Units 07/10/22  1142 07/10/22  0757   SODIUM mmol/L  --  150*   POTASSIUM mmol/L 2.5* 2.8*   CHLORIDE mmol/L  --  94*   CO2 mmol/L  --  44.0*   BUN mg/dL  --  35*   CREATININE mg/dL  --  0.82   GLUCOSE mg/dL  --  175*   CALCIUM mg/dL  --  9.6     Results from last 7 days   Lab Units 07/10/22  1142   INR  1.32*     Results from last 7 days   Lab Units 07/10/22  1142 07/10/22  0757   SODIUM mmol/L  --  150*   POTASSIUM mmol/L 2.5* 2.8*   CHLORIDE mmol/L  --  94*   CO2 mmol/L  --  44.0*   BUN mg/dL  --  35*   CREATININE mg/dL  --  0.82   CALCIUM mg/dL  --  9.6   ALK PHOS U/L  --  125*   ALT (SGPT) U/L  --  1,278*   AST (SGOT) U/L  --  622*   GLUCOSE mg/dL  --  175*       XR Femur 2 View Left    Result Date: 7/8/2022  DATE OF EXAM: 7/8/2022 7:36 AM  PROCEDURE: XR FEMUR 2 VW LEFT-  INDICATIONS: LE Injury triage protocol  COMPARISON: No comparisons available.  TECHNIQUE: Two radiographic views of the left femur were obtained.  FINDINGS: There is a comminuted fracture of the intertrochanteric region of the left femur. The  greater trochanter is a separate fragment. There is slight varus angulation. The remainder of the femur is intact      Intertrochanteric left femur fracture  This report was finalized on 7/8/2022 8:11 AM by Eric Middleton.      XR Pelvis 1 or 2 View    Result Date: 7/8/2022  DATE OF EXAM: 7/8/2022 7:35 AM  PROCEDURE: XR PELVIS 1 OR 2 VW-  INDICATIONS: fall  COMPARISON: No comparisons available.  TECHNIQUE: An AP radiologic view of the pelvis was obtained.  FINDINGS: Fracture of the intertrochanteric region of the left femur. Greater trochanter is a separate fragment. Femoral head is not dislocated. There has been previous right hip hemiarthroplasty. No fracture of the pelvis is demonstrated. There is a lumbosacral fusion        Physical Exam:  General: On BiPAP when I evaluated her-evaluate her multiple times today.  Vascular: digits are pink and well perfused  Operative Hip Exam:   Neurologic: Able to move her ankle and toes  Left hip shortened and externally rotated        Closed left hip fracture, initial encounter (HCC)    Essential hypertension    COPD with hypoxia (HCC)    CAP (community acquired pneumonia)    Acute on chronic respiratory failure with hypoxia and hypercapnia (HCC)    Elevated procalcitonin    Thrombocytopenia (HCC)    Hypokalemia    Elevated liver enzymes    Seizure (HCC)      Assessment/Plan:    Disposition: Currently planning for surgery on 7/10/2022 as long as she remains medically optimized.  She remains dealing with multiple medical issues.    POD #: Plan is for surgery on 7/10/2022    Procedure: Hip intramedullary nail for hip fracture-plan procedure for left hip    Anemia - acute blood loss anemia following hip fracture    Low platelets, hypokalemia-primary team and hematology team evaluating    Pain control - block per anesthesia    Dressing - N/A    Antibiotics -medical team    Diet - n.p.o. after midnight for surgery on 7/10/2022    Activity -   Nonweightbearing  DVT Prophylaxis -  SCDs until surgery  PT/OT Consult - nonweightbearing left lower extremity    Osteoporotic hip fracture - I recommend informing the patient's PCP regarding osteoporotic fracture/consideration of enrolling patient in osteoporotic care program postoperatively      Follow-up - to be determined     I discussed the patient's findings and my recommendations with patient with multiple evaluations today.    No future appointments.        Flip Ying MD, FAAOS  Orthopedic Surgeon  [facility]  Orthopedics and Sports Medicine  [address] [place] [address]. [address]  [place] [address]      Flip Ying MD  07/10/22  14:10 EDT

## 2022-07-10 NOTE — ANESTHESIA PROCEDURE NOTES
Peripheral Block      Patient reassessed immediately prior to procedure    Patient location during procedure: pre-op  Reason for block: procedure for pain and at surgeon's request  Preanesthetic Checklist  Completed: patient identified, IV checked, site marked, risks and benefits discussed, surgical consent, monitors and equipment checked, pre-op evaluation and timeout performed  Prep:  Sterile barriers:cap, gloves and mask  Prep: ChloraPrep  Patient monitoring: blood pressure monitoring, continuous pulse oximetry and EKG  Procedure  Performed under: local infiltration  Guidance:ultrasound guided  Images:still images obtained, printed/placed on chart    Laterality:left  Block Type:fascia iliaca compartment  Injection Technique:catheter  Needle Type:echogenic  Needle Gauge:18 G  Resistance on Injection: none  Catheter Size:20 G (20g)  Cath Depth at skin: 15 cm    Medications Used: ropivacaine (NAROPIN) 0.5 % injection, 25 mL      Medications  Preservative Free Saline:20ml    Post Assessment  Injection Assessment: negative aspiration for heme, no paresthesia on injection and incremental injection  Patient Tolerance:comfortable throughout block  Complications:no  Additional Notes  Procedure:                 Pt placed in supine position.   The insertion site was prepped in sterile fashion with Chlorapreop and clear plastic drapes.  Analgesia was provided by skin infiltration at insertion site with Lidocaine 1% 3mls.  A B-Tyson 18 g , 4 inch echogenic Touhy needle was advance In-plane under ultrasound guidance. The   Anterior superior Iliac crest was initially visualized and the probe was directed slightly medially and slightly towards the umbilicus.  The course of the needle was tracked over the sartorius muscle through the fascia Iliacus and into the anterior portion of the Iliacus muscle.  Major vessels where identified and avoided as where structures of the peritoneal cavity.  LA injection was made incrementally in  1-5ml amounts spread was visualized superiorly below fascia iliacus.  Injection was completed with negative aspiration of blood and negative intravascular injection.  Injection pressures where normal or minimal resistance.  A 20 g B-Tyson wire styleted catheter was then advance thru the needle and very easily placed in a superior or cephalad direction.  The catheter was secured at insertion site with exofin tissue adhesive, benzoin, and steristreps.  A CHG tegaderm dressing was placed over the insertion site and the nerve catheter labeled and capped.  Thank You.

## 2022-07-10 NOTE — BRIEF OP NOTE
HIP TROCHANTERIC NAILING WITH INTRAMEDULLARY HIP SCREW  Progress Note    Sierra Saba  7/10/2022    Pre-op Diagnosis:   Left displaced intertrochanteric hip fracture       Post-Op Diagnosis Codes:   same    Procedure/CPT® Codes:      Left hip intertrochanteric hip nailing  Procedure(s):  HIP TROCHANTERIC NAILING WITH INTRAMEDULLARY HIP SCREW    Surgeon(s):  Flip Ying MD    Anesthesia: General with Block    Staff:   Circulator: Zandra Jarvis RN; Diane Garibay RN  Radiology Technologist: Keli Fitch  Scrub Person: Talia Barahona  Vendor Representative: Kana Alexander  Nursing Assistant: Chiki Graham         Estimated Blood Loss: 75 mL    Urine Voided: * No values recorded between 7/10/2022  2:55 PM and 7/10/2022  4:03 PM *    Specimens:                None          Drains:   [REMOVED] External Urinary Catheter (Removed)   Site Assessment Clean;Skin intact 07/10/22 0811   Application/Removal external catheter changed;skin care provided 07/09/22 1948   Collection Container Wall suction 07/10/22 1200   Wall suction (mmHG) 120 mmHG 07/10/22 1200   Securement Method Securing device 07/10/22 1200   Catheter care complete Yes 07/09/22 1948   Output (mL) 500 mL 07/09/22 2059       Findings: Displaced intertrochanteric hip fracture        Complications: None          Flip Ying MD     Date: 7/10/2022  Time: 16:21 EDT

## 2022-07-10 NOTE — OP NOTE
OPERATIVE REPORT  SIDE: Left hip    Date of Surgery: 7/10/2022    Location: [place], [place]    Surgeon: Flip Ying M.D.         Anesthesia:  General endotracheal anesthesia    Block: Indwelling catheter with Iliofascial block by anesthesia team      Left displaced intertrochanteric hip fracture  Pre-operative Diagnosis                          1. Acute, displaced, closed intertrochanteric hip fracture      Post-operative Diagnosis  1. Acute, displaced, closed intertrochanteric hip fracture    Operative Procedure  -left hip       1. Hip cephalomedullary nail for displaced intertrochanteric hip fracture - CPT code 54286    Intraoperative Complications: None    Estimated Blood Loss:75mL    Drain: NO    Specimen: none    Antibiotics:   Patient had continuation of her scheduled antibiotics on the floor/infused prior to incision    Fixation Technique  Cephalomedullary nail  Traction with [place] Table    Intraoperative findings:   Fracture hematoma, hemorrhagic bursal tissue, displaced intertrochanteric hip fracture    Components:  Cephalomedullary Nail Type  Synthes TFNA  Nail Diameter:  11 mm  Neck angle:  130 degrees  Nail Length:  170 mm (short nail)  TFNA Helical Blade Length: 95 mm  Distal Screw Length: 36 mm      Time out: Time out was called and the patient, side, site, and intended procedure was confirmed.    Counts: Needle, lap sponge, and Ray-Jerson sponge counts were correct prior to closure.    Marked: The patient was marked with an indelible marker in the preoperative holding area confirming the correct site and side.    History & Physical:   A history and physical examination was completed at the time of consultation.     Consent: The patient signed the consent form for surgery with an understanding of the risks and benefits as outlined at the time of consultation and in the preoperative holding area.    Risks and Benefits:   Need for blood transfusion; medical complications with  anesthesia/surgery including deep venous thrombosis, pulmonary embolus, stroke, myocardial infarction, and death; potential block complications if block performed; infection; revision surgery; fracture; failure of hardware; nerve or blood vessel injury; incomplete pain relief; incomplete restoration of hip range of motion; limp     Indications for surgery: The patient suffered an acute hip intertrochanteric fracture.      Incredibly complex patient with multiple medical comorbidities and multiple ongoing issues with multiple specialist following along.  Long discussions with patient, son, medical team, anesthesia team.  Ultimately we felt she was medically optimized and stable enough for surgery today to address her significantly displaced left intertrochanteric hip fracture.  The patient tolerated the procedure well minimal blood loss noted.    Operative Findings     Displaced intertrochanteric hip fracture    Procedure in Detail:   The patient was seen and identified in the preoperative area.  The operative extremity was marked with an indelible marker.  The patient was examined and a consult note was signed in the chart.  The patient understood the risks and benefits of the surgery and elected to proceed with surgery. The patient was taken to the operating room, placed on the operating table in the supine position where general anesthesia was administered.  All bony prominences were well-padded.  The upper extremities were padded and secured.    The well leg was placed and well-padded in the well leg kraft.  The operative leg was placed in the traction boot. C-arm confirmed the fracture.  Traction was applied and rotation dialed in to reduce the fracture prior to sterile prep and drape.    Using the preoperative planning guide and a guide pin, I determined the appropriate neck angle and appropriate femoral canal sizing for the nail.    The operative extremity was cleaned with isopropyl alcohol then sterile  prepping and draping. Iodine-impregnated drape was placed to ensure no skin edges were exposed.  A fresh pair of surgical gloves was placed following placement of the iodine-impregnated drape.  The surgical incision was planned using a skin marker extending from approximately 2 finger breadths proximal to the superior portion of the greater trochanter.    Fascial split was completed sharply and then bluntly down to the greater trochanter.  A guide pin was placed and confirmed on AP and lateral fluoroscopy views confirming appropriate positioning.  The guide pin was advanced just beyond the lesser trochanter.    The large entry reamer was advanced and followed on fluoroscopy down to the level of the lesser trochanter.  The reamer and guide pin were removed.    The nail was seated and confirmed on fluoroscopy.  The outrigger was placed, incision to bone using guided knife blade, and then placement of guide pin.  The AP and lateral guide pin placement were confirmed with fluoroscopy - slightly inferior to the center point on the AP and centered on the lateral.  The guide pin was advanced using both views to confirm the length and extra-articular placement of the pin.    The helical blade screw length was measured twice and confirmed.  The lateral drill was used to open the cortex.  The helical blade was advanced and confirmed on AP and lateral fluoroscopy views.  The length was satisfactory and extra-articular.    The nail was locked into place and turned back a half turn as instructed according to the technique guide.      The fracture was then compressed using the compress feature and this was closely followed on fluoroscopy with satisfactory fracture compression.    The outrigger was partially disassembled and then the knife was used for the distal screw placement.  Triple trocar was placed, drilled for distal locking bolt and confirmed length with fluoroscopy.  The screw was placed with good bite.  The screw was  confirmed on AP and lateral fluoroscopy views.    The entire outrigger was removed.  Final fluoroscopy views were completed with good fracture reduction and compression on AP and lateral views.  No periprosthetic fracture was identified.    The incisions were copiously irrigated and the closed deep layer with 0-Vicryl, 2-0 Vicryl, and then Exofin and Steri-Strips for the skin.  Surgical dressing was applied.  The patient tolerated the procedure well and was transferred to the recovery room in satisfactory condition.        Postoperative Plan:    Radiographs: AP pelvis and hip lateral in recovery room    Activity: Weight bearing as tolerated letting pain be the patient's guide and assistance needed along with walker and wheelchair.    PT consultation: weight bearing as tolerated with assistance and walker/wheelchair    Antibiotics: postoperative IV antibiotics for 24hr postoperatively-he is scheduled to be on scheduled antibiotics on the floor already    DVT prophylaxis: Perioperative DVT prophylaxis with sequential compression devices (SCDs) on both lower extremities.  The patient has low platelets elevated liver enzymes, and currently working up HIT, so I will defer to the primary team with regards to anticoagulation    Diet: advance as tolerated    Follow-up in clinic: 3 weeks, no sutures to be removed, x-rays on arrival at clinic AP pelvis and hip lateral of the left hip, weight-bear as tolerated

## 2022-07-10 NOTE — PROGRESS NOTES
"                  Clinical Nutrition     Nutrition Assessment  Reason for Visit:   Identified at risk by screening criteria, MST score 2+      Patient Name: Sierra Saba  YOB: 1940  MRN: [id number]  Date of Encounter: 07/09/22 20:28 EDT  Admission date: 7/8/2022      Comments: Malnutrition dx pending further data       Admission Diagnosis    Closed left hip fracture, initial encounter (HCC) [S72.002A]      Problem List    Thrombocytopenia (HCC) - new, resolved being monitored    Acute on chronic respiratory failure with hypoxia and hypercapnia (HCC)    CAP (community acquired pneumonia)      COPD with hypoxia (HCC)      Seizure (HCC)d/o      Essential hypertension      Hypokalemia - resolved      Elevated liver enzymes    Other Applicable: GERD, Renal mass, Lung blebs, Hx malnutrition dx Feb 2022     Applicable Interval History:      Applicable PMH/PSxH:     PMH: She  has a past medical history of Anxiety, COPD (chronic obstructive pulmonary disease) (HCC), Hypertension, and Seizures (HCC).   PSxH: She  has a past surgical history that includes Back surgery; Hysterectomy; Total hip arthroplasty; Cholecystectomy; and Chest tube insertion (Left, 10/20/2021).         Diet/Nutrition Related History:     Pt allows has been eating ok. Gives menu choices. Per RN pt may use suppl.    Anthropometrics     Admission Height 165.1 cm (65\") Documented at 07/08/2022 0722   Admission Weight 63 kg (139 lb) Documented at 07/08/2022 0722        Height: 165.1 cm (65\")    Last filed wt: Weight: 63 kg (139 lb) (07/08/22 0722)  Weight Method: Stated ? accuracy    BMI: BMI (Calculated): 23.1  Normal: 18.5-24.9kg/m2      lbs      Weight Change   UBW: unk at this time Per EMR bed wt of 123 lbs in May  Weight change:   % wt change:   Time frame of weight loss:     Labs reviewed   Yes    Results from last 7 days   Lab Units 07/09/22  0514 07/08/22  1543 07/08/22  0734 07/06/22  0113   GLUCOSE mg/dL 139* 145* 190* 215* "   BUN mg/dL 25* 22 22 23   CREATININE mg/dL 0.69 0.61 0.69 0.63   SODIUM mmol/L 148* 142 143 141   CHLORIDE mmol/L 96* 91* 88* 89*   POTASSIUM mmol/L 4.2 2.4* 2.5* 2.4*   MAGNESIUM mg/dL 2.2  --   --  2.0   ALT (SGPT) U/L 187*  --  193* 213*     Results from last 7 days   Lab Units 07/09/22  0514 07/08/22  0734 07/06/22  0113   ALBUMIN g/dL 3.80 3.70 3.40*             Lab Results   Lab Value Date/Time    HGBA1C 5.60 02/21/2022 1945       Medications reviewed   Yes  Abx, Pepcid, Probiotic, Keppra, Pericolace, Tranexamic Acid    Intake/Ouptut 24 hrs reviewed   Yes    Intake & Output (last day)       07/09 0701  07/10 0700    P.O. 200    Blood     Total Intake(mL/kg) 200 (3.2)    Urine (mL/kg/hr) 1850 (2.2)    Total Output 1850    Net -1650         Urine Unmeasured Occurrence 2 x          Nutrition Focused Physical Exam 7/9     Subcutaneous fat:  Orbital Severe   Buccal Severe   Tricep Moderate   Ribs- thoracic and lumbar region, lower back, midaxillary line Severe     Muscle:  Temple/temporalis Unable to determine at this time   Clavicle/acromion- pectoralis major, deltoid, trapezius Moderate   Shoulder- deltoid Moderate   Scapular- trapezius, latissimus dorsi Moderate   Interosseous- dorsal hand Mild   Thigh- quadriceps Unable to determine at this time   Calf- gastrocnemius Unable to determine at this time      knee                                                       moderate    Currnt Nutrition Prescription     PO: NPO Diet NPO Type: Sips with Meds  Diet Regular; Cardiac  Supplement: Boost Plus 2x/da added per RD    Intake:  insuffic data 50% x 1 meal      Nutrition Diagnosis     7/9  Problem  Malnutrition dx pending   Etiology    Signs/Symptoms    Status:      Goal:   General: Nutrition to support treatment  PO: Establish PO  Additional goals:      Nutrition Intervention     Follow treatment progress, Care plan reviewed, Advise alternate selection, Menu provided, Supplement provided      Monitoring/Evaluation:    Per protocol, I&O, PO intake, Pertinent labs, Weight, Symptoms        Faviola Graham RD,   Time Spent: 30 min

## 2022-07-11 NOTE — PROGRESS NOTES
Acute pain service Inpatient Progress Note    Patient Name: Sierra Saba  :  1940  MRN:  [id number]        Acute Pain  Service Inpatient Progress Note:    Analgesia:Excellent  Pain Score:0/10  LOC: alert and awake  Resp Status: room air  Cardiac: VS stable  Side Effects:None  Catheter Site:clean, dressing intact and dry  Catheter type: Infusystem.  Volume: 1/10/10.  Catheter Plan:Catheter to remain Insitu and Continue catheter infusion rate unchanged

## 2022-07-11 NOTE — PROGRESS NOTES
[facility] Medicine Services  PROGRESS NOTE    Patient Name: Sierra Saba  : 1940  MRN: [id number]    Date of Admission: 2022  Primary Care Physician: Belinda King DO    Subjective   Subjective     CC:  F/U hip fracture    HPI:  Breathing is much better today.      ROS:  Gen- No fevers  Resp- No dyspnea      Objective   Objective     Vital Signs:   Temp:  [97.2 °F (36.2 °C)-98.8 °F (37.1 °C)] 97.2 °F (36.2 °C)  Heart Rate:  [102-125] 109  Resp:  [12-28] 20  BP: (107-163)/(73-91) 140/84  Flow (L/min):  [4-15] 6     Physical Exam:  Constitutional: No acute distress, awake, alert, sitting up in chair, chronically ill appearing  HENT: NCAT, mucous membranes moist  Respiratory: Clear to auscultation bilaterally, respiratory effort normal on nasal cannula  Cardiovascular: RRR, no murmurs, rubs, or gallops  Gastrointestinal: Soft, nontender, nondistended  Musculoskeletal: No BLE edema  Psychiatric: Appropriate affect, cooperative  Neurologic: Speech clear and fluent  Skin: Multiple bruises, scabbed lesions on upper extremities    Results Reviewed:  LAB RESULTS:      Lab 07/10/22  1142 07/10/22  0757 22  1024 22  0635 22  0514 22  0141 22  1543 22  0734   WBC 4.06 3.65  --   --  12.31* 6.74  --  9.70   HEMOGLOBIN 10.2* 10.6*  --   --  11.6* 10.3*  --  13.3   HEMATOCRIT 31.6* 31.4*  --   --  36.3 32.3*  --  40.4   PLATELETS 89* 87*  --   --  207 141 34* 58*   NEUTROS ABS 2.15 1.72  --   --  7.51* 4.17  --  5.99   IMMATURE GRANS (ABS)  --  0.23*  --   --   --  0.51*  --  0.73*   LYMPHS ABS  --  1.48  --   --   --  1.63  --  2.41   MONOS ABS  --  0.20  --   --   --  0.39  --  0.49   EOS ABS 0.04 0.01  --   --  0.12 0.01  --  0.02   MCV 95.5 92.4  --   --  95.5 95.6  --  95.1   PROCALCITONIN  --   --   --   --  5.29*  --   --   --    LACTATE  --   --  2.0 2.3* 2.4*  --   --   --    PROTIME 16.4*  --   --   --   --   --   --  14.7*   APTT 27.1  --   --    --   --   --   --   --    D DIMER QUANT 3.85*  --   --   --   --   --   --   --          Lab 07/10/22  1718 07/10/22  1431 07/10/22  1142 07/10/22  0757 07/10/22  0011 07/09/22  0514 07/08/22  1543 07/08/22  0734 07/06/22  0113   SODIUM  --   --   --  150* 148* 148* 142 143 141   POTASSIUM 3.1* 3.4* 2.5* 2.8* 3.2* 4.2 2.4* 2.5* 2.4*   CHLORIDE  --   --   --  94* 93* 96* 91* 88* 89*   CO2  --   --   --  44.0* 42.0* 43.0* 43.0* 44.0* 42.0*   ANION GAP  --   --   --  12.0 13.0 9.0 8.0 11.0 10.0   BUN  --   --   --  35* 35* 25* 22 22 23   CREATININE  --   --   --  0.82 0.73 0.69 0.61 0.69 0.63   EGFR  --   --   --  72.4 83.3 87.9 90.5 87.9 89.8   GLUCOSE  --   --   --  175* 196* 139* 145* 190* 215*   CALCIUM  --   --   --  9.6 9.1 9.4 8.7 9.4 8.7   MAGNESIUM  --   --   --   --   --  2.2  --   --  2.0         Lab 07/10/22  0757 07/09/22  0514 07/08/22  0734 07/06/22  0113 07/05/22  1836   TOTAL PROTEIN 5.6* 6.3 5.8* 5.4* 5.6*   ALBUMIN 3.20* 3.80 3.70 3.40* 3.50   GLOBULIN 2.4  --  2.1 2.0 2.1   ALT (SGPT) 1,278* 187* 193* 213* 225*   AST (SGOT) 622* 102* 96* 84* 94*   BILIRUBIN 1.8* 1.7* 1.5* 1.0 1.1   INDIRECT BILIRUBIN  --  0.8  --   --   --    BILIRUBIN DIRECT  --  0.9*  --   --   --    ALK PHOS 125* 129* 125* 102 103         Lab 07/10/22  1142 07/10/22  0757 07/08/22  0734   PROBNP  --  1,680.0  --    PROTIME 16.4*  --  14.7*   INR 1.32*  --  1.15*             Lab 07/08/22  0843   ABO TYPING A   RH TYPING Positive   ANTIBODY SCREEN Negative         Lab 07/10/22  0917 07/09/22  0454   PH, ARTERIAL 7.629* 7.456*   PCO2, ARTERIAL 49.9* 69.9*   PO2 ART 55.9* 64.3*   FIO2 35 50   HCO3 ART 52.3* 49.3*   BASE EXCESS ART 27.8* 21.5*   CARBOXYHEMOGLOBIN 1.2 1.2     Brief Urine Lab Results  (Last result in the past 365 days)      Color   Clarity   Blood   Leuk Est   Nitrite   Protein   CREAT   Urine HCG        07/08/22 1208 Yellow   Clear   Negative   Negative   Negative   30 mg/dL (1+)                 Microbiology Results  Abnormal     Procedure Component Value - Date/Time    Blood Culture - Blood, Hand, Right [[id number]]  (Normal) Collected: 07/10/22 0011    Lab Status: Preliminary result Specimen: Blood from Hand, Right Updated: 07/11/22 0704     Blood Culture No growth at 24 hours    Blood Culture - Blood, Arm, Right [[id number]]  (Normal) Collected: 07/10/22 0011    Lab Status: Preliminary result Specimen: Blood from Arm, Right Updated: 07/11/22 0704     Blood Culture No growth at 24 hours    COVID PRE-OP / PRE-PROCEDURE SCREENING ORDER (NO ISOLATION) - Swab, Nasopharynx [[id number]]  (Normal) Collected: 07/08/22 0836    Lab Status: Final result Specimen: Swab from Nasopharynx Updated: 07/08/22 0943    Narrative:      The following orders were created for panel order COVID PRE-OP / PRE-PROCEDURE SCREENING ORDER (NO ISOLATION) - Swab, Nasopharynx.  Procedure                               Abnormality         Status                     ---------                               -----------         ------                     COVID-19, ABBOTT IN-HOUS...[[id number]]  Normal              Final result                 Please view results for these tests on the individual orders.    COVID-19, ABBOTT IN-HOUSE,NASAL Swab (NO TRANSPORT MEDIA) 2 HR TAT - Swab, Nasopharynx [[id number]]  (Normal) Collected: 07/08/22 0836    Lab Status: Final result Specimen: Swab from Nasopharynx Updated: 07/08/22 0943     COVID19 Presumptive Negative    Narrative:      Fact sheet for providers: https://www.fda.gov/media/589263/download     Fact sheet for patients: https://www.fda.gov/media/400825/download    Test performed by PCR.  If inconsistent with clinical signs and symptoms patient should be tested with different authorized molecular test.          XR Chest 1 View    Result Date: 7/9/2022  DATE OF EXAM: 7/9/2022 6:50 PM  PROCEDURE: XR CHEST 1 VW-  INDICATIONS: Evaluate pulmonary infiltrate; S72.002A-Fracture of unspecified part of neck of left femur, initial  encounter for closed fracture; E87.6-Hypokalemia; Z87.09-Personal history of other diseases of the respiratory system.  COMPARISON: Chest x-ray 7/9/2022 5:06 AM  TECHNIQUE: Single frontal view of the chest.  FINDINGS: Stable cardiomediastinal silhouette within normal limits. Stable small right greater than left bilateral pleural effusions. Mild improvement in interstitial edema. No evidence of new or worsening airspace disease. No pneumothorax. No acute osseous findings.      Impression: Mildly improved appearance of pulmonary edema.  This report was finalized on 7/9/2022 9:49 PM by Chuck Lomeli MD.      FL C Arm During Surgery    Result Date: 7/10/2022  DATE OF EXAM: 7/10/2022 3:11 PM  PROCEDURE: FL C ARM DURING SURGERY-  INDICATIONS: Troch nail; S72.002A-Fracture of unspecified part of neck of left femur, initial encounter for closed fracture; E87.6-Hypokalemia; Z87.09-Personal history of other diseases of the respiratory system  COMPARISON: No comparisons available.  TECHNIQUE: 5 fluoroscopic spot images obtained over 1 minute 45 seconds of fluoroscopy time  FINDINGS: Fluoroscopic images demonstrate ORIF of the left hip.      Impression: Fluoroscopic imaging obtained without a radiologist present. Please see performing physician notes for full detail  This report was finalized on 7/10/2022 9:22 PM by Jake Tellez.      XR Hip With or Without Pelvis 2 - 3 View Left    Result Date: 7/10/2022  DATE OF EXAM: 7/10/2022 7:20 PM  PROCEDURE: XR HIP W OR WO PELVIS 2-3 VIEW LEFT-  INDICATIONS: left hip intertrochanteric fracture?please obtain in PACU AP pelvis and crosstable lateral of the left hip  COMPARISON: 7/8/2022  TECHNIQUE: Two views of the left hip with Pelvis were obtained.  FINDINGS: Patient is status post ORIF of the proximal left femur. Intramedullary nail and dynamic blade through the femoral neck are noted. Fracture fragments demonstrate gross anatomic alignment. Interlocking screw is noted. No acute  osseous abnormality is otherwise appreciated. Imaging of the pelvis is grossly unremarkable sacrum is obscured by bowel gas and limited by severe osteopenia. Right total hip arthroplasty in place      Impression: Status post ORIF left hip fracture    This report was finalized on 7/10/2022 7:45 PM by Jake Tellez.        Results for orders placed during the hospital encounter of 05/12/22    Adult Transthoracic Echo Complete W/ Cont if Necessary Per Protocol    Interpretation Summary  · There is a large left pleural effusion.  · Estimated right ventricular systolic pressure from tricuspid regurgitation is moderately elevated (45-55 mmHg). Calculated right ventricular systolic pressure from tricuspid regurgitation is 45 mmHg.  · Left ventricular ejection fraction appears to be 61 - 65%.  · There is moderate plaque in the descending aorta present.  · Left ventricular diastolic dysfunction is noted.      I have reviewed the medications:  Scheduled Meds:acetaminophen, 650 mg, Oral, Q8H  busPIRone, 10 mg, Oral, TID  cefepime, 2 g, Intravenous, Q8H  doxycycline, 100 mg, Intravenous, Q12H  famotidine, 10 mg, Oral, BID AC  ipratropium-albuterol, 3 mL, Nebulization, Q8H - RT  lactobacillus acidophilus, 1 capsule, Oral, Daily  levETIRAcetam, 500 mg, Intravenous, Q12H  metoprolol succinate XL, 25 mg, Oral, Daily  PARoxetine, 20 mg, Oral, Daily  senna-docusate sodium, 2 tablet, Oral, BID  sodium chloride, 10 mL, Intravenous, Q12H      Continuous Infusions:lactated ringers, 9 mL/hr  lactated ringers, 9 mL/hr, Last Rate: 9 mL/hr (07/10/22 1823)  Pharmacy Consult - Pharmacy to dose,   ropivacaine,       PRN Meds:.•  acetaminophen  •  senna-docusate sodium **AND** polyethylene glycol **AND** bisacodyl **AND** bisacodyl  •  calcium carbonate  •  HYDROcodone-acetaminophen  •  ipratropium-albuterol  •  Morphine **AND** naloxone  •  Pharmacy Consult - Pharmacy to dose  •  potassium chloride **OR** potassium chloride **OR** potassium  chloride  •  potassium chloride **OR** potassium chloride **OR** potassium chloride  •  sodium chloride  •  sodium chloride    Assessment & Plan   Assessment & Plan     Active Hospital Problems    Diagnosis  POA   • **Closed left hip fracture, initial encounter (HCC) [S72.002A]  Yes   • Thrombocytopenia (HCC) [D69.6]  Yes   • Hypokalemia [E87.6]  Yes   • Elevated liver enzymes [R74.8]  Yes   • Seizure (HCC) [R56.9]  Yes   • Acute on chronic respiratory failure with hypoxia and hypercapnia (HCC) [J96.21, J96.22]  Yes   • Elevated procalcitonin [R79.89]  Yes   • COPD with hypoxia (HCC) [J44.9, R09.02]  Yes   • CAP (community acquired pneumonia) [J18.9]  Yes   • Essential hypertension [I10]  Yes      Resolved Hospital Problems   No resolved problems to display.        Brief Hospital Course to date:  Sierra Saba is a 81 y.o. female with COPD, HTN, seizure disorder who presented from her SNF after a fall and was found to have left intertrochanteric hip fracture as well as hypokalemia, thrombocytopenia and elevated LFTs.  She was being treated with levaquin for possible pneumonia.  She was transfused 2 units platelets in preparation for surgery and had a respiratory decompensation with concern for volume overload, possible sepsis.    This patient's problems and plans were partially entered by my partner and updated as appropriate by me 07/11/22.    Left intertrochanteric femur fracture   - Imaging with intertrochanteric left femur fracture   - Dr. Ying following  - Continue PRN pain control  - S/P repair 7/10    Acute on chronic mixed respiratory failure  Suspected volume overload after platelet transfusion/acute on chronic diastolic CHF  Possible sepsis due to pneumonia  - Blood cultures, sputum culture pending  - Continue cefepime, doxycycline (penicillin allergy)  - S/P diuresis with IV lasix.  Limited now due to hypernatremia and metabolic alkalosis   - Recent echo with normal LVEF, diastolic dysfunction   -  Respiratory status improved significantly and decision was made to proceed with surgery 7/11     Hypokalemia   - Continue replacement aggressively     Thrombocytopenia   - Possibly reactive related to infection/consumption from trauma, underlying hepatic cirrhosis may be contributing  - Resolved s/p 2 units platelets but now decreased again.  Monitor.     Elevated LFTs  Hepatic cirrhosis on ultrasound  - Likely ischemic or congestive hepatopathy related to acute CHF, possibly had a hypotensive episode at some point.  - Acute hepatitis panel is negative  - Continue to monitor LFTs-some improvement today  - With cytopenias will check MARICHUY, AMA, anti smooth muscle antibody, Ig profile, ANCA     HTN   - Continue home metoprolol, resume amlodipine as tolerated      COPD  - On 2-3L home O2.   - Continue home inhalers     Seizure disorder  - Change keppra to IV until she is stable enough to take PO reliably    Expected Discharge Location and Transportation: [facility]  Expected Discharge Date: 7/15    DVT prophylaxis:  Mechanical DVT prophylaxis orders are present.     AM-PAC 6 Clicks Score (PT): 6 (07/09/22 0800)    CODE STATUS:   Code Status and Medical Interventions:   Ordered at: 07/08/22 1040     Medical Intervention Limits:    NO intubation (DNI)     Level Of Support Discussed With:    Patient     Code Status (Patient has no pulse and is not breathing):    No CPR (Do Not Attempt to Resuscitate)     Medical Interventions (Patient has pulse or is breathing):    Limited Support       Bettie oCok MD  07/11/22

## 2022-07-11 NOTE — PLAN OF CARE
Goal Outcome Evaluation:  Plan of Care Reviewed With: patient           Outcome Evaluation: Pt alert, reports minimal L hip pain at rest and follows commands with cues to participate. She completed bed mobility with max assist x2, transfer to chair with max assist x2 and required KI to LLE d/t residual quad weakness. She completed UB dressing task with max assist and LB dressing with dependence. Pt not a candidate for AE training d/t cognitive status. Pt on 6L NC for mobility, desat to 76% and - nursing aware. Recommend SNF.

## 2022-07-11 NOTE — PLAN OF CARE
Goal Outcome Evaluation:  Plan of Care Reviewed With: patient        Progress: no change   VSS, on Bipap again tonight. Voiding well. Turned Q2. Slept some. Restraints in place. Ice applied to incision. Pain controlled with infusystem. Able to tolerate Bipap off to take meds.

## 2022-07-11 NOTE — PROGRESS NOTES
Subjective     PROBLEM LIST:  Patient Active Problem List   Diagnosis   • Cough   • Fall   • Acute kidney injury (HCC)   • Essential hypertension   • COPD with hypoxia (HCC)   • Lung blebs (HCC)   • Left renal mass   • CAP (community acquired pneumonia)   • GERD without esophagitis   • KIM (generalized anxiety disorder)   • Hyperglycemia   • Pneumonia of both lower lobes   • Severe malnutrition (CMS/HCC)   • Acute on chronic respiratory failure with hypoxia and hypercapnia (HCC)   • Elevated procalcitonin   • Closed left hip fracture, initial encounter ([facility])   • Thrombocytopenia (HCC)   • Hypokalemia   • Elevated liver enzymes   • Seizure (HCC)         INTERVAL HISTORY:  Status post hip nailing yesterday.  Pain seems controlled.  Working with PT this morning.  On high flow oxygen.        REVIEW OF SYSTEMS:  A 14 point review of systems was performed and is negative except as noted above.      Objective     Vitals:    07/11/22 0717 07/11/22 0742 07/11/22 1009 07/11/22 1158   BP: 140/84  139/86 146/78   BP Location: Left arm   Left arm   Patient Position: Lying   Sitting   Pulse: 104 109 114 106   Resp: 20 20  18   Temp: 97.2 °F (36.2 °C)   97.4 °F (36.3 °C)   TempSrc: Oral   Oral   SpO2: 98% 94%  94%   Weight:       Height:           Performance Status: 4  General:  female in no acute distress  Neuro: alert, calm  HEENT: sclerae anicteric, oropharynx clear  Lymphatics: no cervical, supraclavicular, or axillary adenopathy  Cardiovascular: regular rate and rhythm, no murmurs  Lungs: clear to auscultation bilaterally  Abdomen: soft, nontender, nondistended.  No palpable organomegaly  Extremities: no lower extremity edema  Skin: Bruising noted on bilateral forearms   Psych: mood and affect appropriate    I have reexamined the patient and the results are consistent with the previously documented exam. SARKIS Slater      Labs:       Lab Results   Component Value Date    WBC 5.65 07/11/2022    HGB 9.4 (L)  07/11/2022    HCT 28.9 (L) 07/11/2022    MCV 96.3 07/11/2022    PLT 61 (L) 07/11/2022     Lab Results   Component Value Date    GLUCOSE 147 (H) 07/11/2022    BUN 31 (H) 07/11/2022    CREATININE 0.66 07/11/2022    EGFRIFNONA 96 02/26/2022    BCR 47.0 (H) 07/11/2022    K 3.3 (L) 07/11/2022    CO2 39.0 (H) 07/11/2022    CALCIUM 8.8 07/11/2022    ALBUMIN 3.00 (L) 07/11/2022     (H) 07/11/2022     (H) 07/11/2022     FL C Arm During Surgery  Narrative: DATE OF EXAM: 7/10/2022 3:11 PM     PROCEDURE: FL C ARM DURING SURGERY-     INDICATIONS: Troch nail; S72.002A-Fracture of unspecified part of neck  of left femur, initial encounter for closed fracture; E87.6-Hypokalemia;  Z87.09-Personal history of other diseases of the respiratory system     COMPARISON: No comparisons available.     TECHNIQUE: 5 fluoroscopic spot images obtained over 1 minute 45 seconds  of fluoroscopy time     FINDINGS:   Fluoroscopic images demonstrate ORIF of the left hip.      Impression: Fluoroscopic imaging obtained without a radiologist present. Please see  performing physician notes for full detail     This report was finalized on 7/10/2022 9:22 PM by Jake Tellez.     XR Hip With or Without Pelvis 2 - 3 View Left  Narrative: DATE OF EXAM: 7/10/2022 7:20 PM     PROCEDURE: XR HIP W OR WO PELVIS 2-3 VIEW LEFT-     INDICATIONS: left hip intertrochanteric fracture?please obtain in PACU  AP pelvis and crosstable lateral of the left hip     COMPARISON: 7/8/2022     TECHNIQUE: Two views of the left hip with Pelvis were obtained.     FINDINGS:  Patient is status post ORIF of the proximal left femur. Intramedullary  nail and dynamic blade through the femoral neck are noted. Fracture  fragments demonstrate gross anatomic alignment. Interlocking screw is  noted. No acute osseous abnormality is otherwise appreciated. Imaging of  the pelvis is grossly unremarkable sacrum is obscured by bowel gas and  limited by severe osteopenia. Right total  hip arthroplasty in place      Impression: Status post ORIF left hip fracture         This report was finalized on 7/10/2022 7:45 PM by Jake Tellez.         Assessment & Plan     Sierra Saba is a 81 y.o. year old female admitted after a fall and a hip fracture.  Found to have new onset thrombocytopenia.     Status post hip nailing yesterday and tolerated procedure well.  She received 2 units platelets prior to surgery.  Platelet count down to 61,000 today.  Significant elevation of liver enzymes concerning for acute liver injury.  Liver enzymes improved today.    Low platelets may be related to acute illness, possible infectious or hypoperfusion.  D-dimer and PT elevated.  Fibrinogen normal.  We will continue to monitor.  Would transfuse as needed for platelets less than 20,000 or active bleeding.         Pearl Casillas, APRN    7/11/2022

## 2022-07-11 NOTE — CASE MANAGEMENT/SOCIAL WORK
"Discharge Planning Assessment  [facility]     Patient Name: Sierra Saba  MRN: [id number]  Today's Date: 7/11/2022    Admit Date: 7/8/2022     Discharge Needs Assessment    No documentation.                Discharge Plan     Row Name 07/11/22 1533       Plan    Plan [facility]    Patient/Family in Agreement with Plan yes    Plan Comments Ms. Saba was a transfer to [facility] from [facility] SNF rehab.  Followed up with Liane at [facility] and she states that does not have a bed hold, however, they can accept her back to the facility for rehab.    Per PT notes, \"Pt presents with cognitive deficits, impaired LLE range of motion, and decreased functional strength limiting her mobility. Pt performed bed mobility max Ax2 and stand pivot transferred from her bed to chair max A x2. IPPT warranted to help restore baseline mobility. Recommend dc SNF.\"  Per MDR, Ms. Saba may be ready for transfer back to rehab at the end of the week.  Updated Liane at [facility].    CM will continue to follow.    Final Discharge Disposition Code 03 - skilled nursing facility (SNF)              Continued Care and Services - Admitted Since 7/8/2022     Destination Coordination complete.    Service Provider Request Status Selected Services Address Phone Fax Patient Preferred    [facility]   Selected Skilled Nursing [address] [id number] [phone] --            Selected Continued Care - Prior Encounters Includes selections from prior encounters from 4/9/2022 to 7/11/2022    Discharged on 5/24/2022 Admission date: 5/12/2022 - Discharge disposition: Skilled Nursing Facility (DC - External)    Destination     Service Provider Selected Services Address Phone Fax Patient Preferred    [facility]  Skilled Nursing [address] [id number] [phone] --                    Expected Discharge Date and Time     Expected Discharge Date Expected Discharge " Time    Jul 13, 2022                 Beatriz Olivarez RN

## 2022-07-11 NOTE — PLAN OF CARE
Goal Outcome Evaluation:  Plan of Care Reviewed With: patient           Outcome Evaluation: Pt presents with cognitive deficits, impaired LLE range of motion, and decreased functional strength limiting her mobility. Pt performed bed mobility max Ax2 and stand pivot transferred from her bed to chair max A x2. IPPT warranted to help restore baseline mobility. Recommend dc SNF

## 2022-07-11 NOTE — THERAPY EVALUATION
Patient Name: Sierra Saba  : 1940    MRN: [id number]                              Today's Date: 2022       Admit Date: 2022    Visit Dx:     ICD-10-CM ICD-9-CM   1. Closed left hip fracture, initial encounter ([facility])  S72.002A 820.8   2. Hypokalemia  E87.6 276.8   3. History of COPD  Z87.09 V12.69     Patient Active Problem List   Diagnosis   • Cough   • Fall   • Acute kidney injury (HCC)   • Essential hypertension   • COPD with hypoxia (HCC)   • Lung blebs (HCC)   • Left renal mass   • CAP (community acquired pneumonia)   • GERD without esophagitis   • KIM (generalized anxiety disorder)   • Hyperglycemia   • Pneumonia of both lower lobes   • Severe malnutrition (CMS/HCC)   • Acute on chronic respiratory failure with hypoxia and hypercapnia (HCC)   • Elevated procalcitonin   • Closed left hip fracture, initial encounter ([facility])   • Thrombocytopenia (HCC)   • Hypokalemia   • Elevated liver enzymes   • Seizure (HCC)     Past Medical History:   Diagnosis Date   • Anxiety    • COPD (chronic obstructive pulmonary disease) (HCC)    • Hypertension    • Seizures (HCC)      Past Surgical History:   Procedure Laterality Date   • BACK SURGERY     • CHEST TUBE INSERTION Left 10/20/2021   • CHOLECYSTECTOMY     • HYSTERECTOMY     • TOTAL HIP ARTHROPLASTY        General Information     Row Name 22 1147          Physical Therapy Time and Intention    Document Type evaluation  -FW     Mode of Treatment physical therapy  -FW     Row Name 22 1147          General Information    Patient Profile Reviewed yes  -FW     Prior Level of Function independent:;gait;transfer;mod assist:;max assist:;ADL's  difficult to obtain PLOF due to cognitive status. Per notes, pt lived at [facility], used a RW/rollator for ambulation and required some form of assistance with ADLs  -FW     Existing Precautions/Restrictions brace worn when out of bed;fall;weight bearing;left  KI for quad weakness, WBAT LLE  -FW     Barriers to Rehab  medically complex;previous functional deficit;cognitive status  -     Row Name 07/11/22 1147          Living Environment    People in Home facility resident  -[facility] Name 07/11/22 1147          Cognition    Orientation Status (Cognition) oriented to;person;disoriented to;place;situation;time;verbal cues/prompts needed for orientation  -[facility] Name 07/11/22 1147          Safety Issues, Functional Mobility    Safety Issues Affecting Function (Mobility) ability to follow commands;awareness of need for assistance;safety precaution awareness;positioning of assistive device;insight into deficits/self-awareness;safety precautions follow-through/compliance;sequencing abilities  -           User Key  (r) = Recorded By, (t) = Taken By, (c) = Cosigned By    Initials Name Provider Type     Alvaro Molina PT Physical Therapist               Mobility     [facility] Name 07/11/22 1153          Bed Mobility    Bed Mobility supine-sit  -FW     Supine-Sit Gordon (Bed Mobility) maximum assist (25% patient effort);verbal cues;nonverbal cues (demo/gesture);2 person assist  -FW     Row Name 07/11/22 1153          Bed-Chair Transfer    Bed-Chair Gordon (Transfers) maximum assist (25% patient effort);2 person assist  -     Assistive Device (Bed-Chair Transfers) other (see comments)  [facility]  -     Comment, (Bed-Chair Transfer) pt with lateral trunk flexion to right to unweight the affected side while seated. Pt not following commands well enough to attempt STS from EOB. Pt transferred via stand pivot- difficulty pivoting on RLE  -[facility] Name 07/11/22 1153          Sit-Stand Transfer    Assistive Device (Sit-Stand Transfers) --  [facility]  -     Comment, (Sit-Stand Transfer) pt with lateral trunk flexion to right to unweight the affected side while seated,  -     Row Name 07/11/22 1153          Gait/Stairs (Locomotion)    [place] Level (Gait) unable to assess  -[facility] Name 07/11/22 1153          Mobility     Extremity Weight-bearing Status left lower extremity  -FW     Left Lower Extremity (Weight-bearing Status) weight-bearing as tolerated (WBAT)  -FW           User Key  (r) = Recorded By, (t) = Taken By, (c) = Cosigned By    Initials Name Provider Type    FW Alvaro Molina PT Physical Therapist               Obj/Interventions     Row Name 07/11/22 1158          Range of Motion Comprehensive    General Range of Motion lower extremity range of motion deficits identified  -FW     Comment, General Range of Motion LLE limited 90%, RLE WFL  -FW     Row Name 07/11/22 1158          Strength Comprehensive (MMT)    General Manual Muscle Testing (MMT) Assessment other (see comments)  -FW     Comment, General Manual Muscle Testing (MMT) Assessment pt not following commands well enough to formally MMT RLE  -FW     Row Name 07/11/22 1158          Balance    Balance Assessment sitting static balance;sitting dynamic balance;standing static balance;standing dynamic balance  -FW     Static Sitting Balance moderate assist  -FW     Dynamic Sitting Balance moderate assist  -FW     Position, Sitting Balance sitting edge of bed  -FW     Static Standing Balance maximum assist;2-person assist  -FW     Position/Device Used, Standing Balance unsupported;supported;other (see comments)  bilateral HHA  -FW     Row Name 07/11/22 1158          Sensory Assessment (Somatosensory)    Sensory Assessment (Somatosensory) unable/difficult to assess  difficult to assess due to pt cognition  -FW           User Key  (r) = Recorded By, (t) = Taken By, (c) = Cosigned By    Initials Name Provider Type     Alvaro Molina PT Physical Therapist               Goals/Plan     Row Name 07/11/22 1301          Bed Mobility Goal 1 (PT)    Activity/Assistive Device (Bed Mobility Goal 1, PT) sit to supine/supine to sit;rolling to left;rolling to right  -FW     [place] Level/Cues Needed (Bed Mobility Goal 1, PT) moderate assist (50-74% patient effort)   -FW     Time Frame (Bed Mobility Goal 1, PT) long term goal (LTG);10 days  -FW     Row Name 07/11/22 1301          Transfer Goal 1 (PT)    Activity/Assistive Device (Transfer Goal 1, PT) sit-to-stand/stand-to-sit  -FW     [place] Level/Cues Needed (Transfer Goal 1, PT) moderate assist (50-74% patient effort)  -FW     Time Frame (Transfer Goal 1, PT) long term goal (LTG);10 days  -FW     Row Name 07/11/22 1301          Gait Training Goal 1 (PT)    Activity/Assistive Device (Gait Training Goal 1, PT) gait (walking locomotion);assistive device use;improve balance and speed;increase endurance/gait distance  -FW     [place] Level (Gait Training Goal 1, PT) moderate assist (50-74% patient effort)  -FW     Distance (Gait Training Goal 1, PT) 15  -FW     Time Frame (Gait Training Goal 1, PT) long term goal (LTG);10 days  -FW     Row Name 07/11/22 1301          Therapy Assessment/Plan (PT)    Planned Therapy Interventions (PT) balance training;bed mobility training;gait training;home exercise program;patient/family education;orthotic fitting/training;stretching;strengthening;ROM (range of motion);postural re-education;transfer training  -FW           User Key  (r) = Recorded By, (t) = Taken By, (c) = Cosigned By    Initials Name Provider Type    FW Alvaro Molina, PT Physical Therapist               Clinical Impression     Row Name 07/11/22 1201          Pain    Pretreatment Pain Rating 0/10 - no pain  -FW     Posttreatment Pain Rating 0/10 - no pain  -FW     Row Name 07/11/22 1201          Plan of Care Review    Plan of Care Reviewed With patient  -FW     Outcome Evaluation Pt presents with cognitive deficits, impaired LLE range of motion, and decreased functional strength limiting her mobility. Pt performed bed mobility max Ax2 and stand pivot transferred from her bed to chair max A x2. IPPT warranted to help restore baseline mobility. Recommend dc SNF  -FW     Row Name 07/11/22 1201          Therapy  Assessment/Plan (PT)    Rehab Potential (PT) good, to achieve stated therapy goals  -FW     Criteria for Skilled Interventions Met (PT) yes;skilled treatment is necessary;meets criteria  -FW     Therapy Frequency (PT) daily  -FW     Row Name 07/11/22 1201          Vital Signs    Pre SpO2 (%) 92  -FW     O2 Delivery Pre Treatment nasal cannula  -FW     Intra SpO2 (%) 76  -FW     O2 Delivery Intra Treatment nasal cannula  -FW     Post SpO2 (%) 91  -FW     O2 Delivery Post Treatment nasal cannula  -FW     Pre Patient Position Supine  -FW     Intra Patient Position Standing  -FW     Post Patient Position Sitting  -FW     Row Name 07/11/22 1201          Positioning and Restraints    Pre-Treatment Position in bed  -FW     Post Treatment Position chair  -FW     In Chair notified nsg;reclined;sitting;call light within reach;encouraged to call for assist;exit alarm on;with OT;legs elevated;L knee immobilizer  -FW           User Key  (r) = Recorded By, (t) = Taken By, (c) = Cosigned By    Initials Name Provider Type    FW Alvaro Molina, BEN Physical Therapist               Outcome Measures     Row Name 07/11/22 1302          How much help from another person do you currently need...    Turning from your back to your side while in flat bed without using bedrails? 1  -FW     Moving from lying on back to sitting on the side of a flat bed without bedrails? 1  -FW     Moving to and from a bed to a chair (including a wheelchair)? 1  -FW     Standing up from a chair using your arms (e.g., wheelchair, bedside chair)? 1  -FW     Climbing 3-5 steps with a railing? 1  -FW     To walk in hospital room? 1  -FW     AM-PAC 6 Clicks Score (PT) 6  -FW     Highest level of mobility 2 --> Bed activities/dependent transfer  -FW     Row Name 07/11/22 1302          Functional Assessment    Outcome Measure Options AM-PAC 6 Clicks Basic Mobility (PT)  -FW           User Key  (r) = Recorded By, (t) = Taken By, (c) = Cosigned By    Initials Name  Provider Type    FW Alvaro Molina PT Physical Therapist                             Physical Therapy Education                 Title: PT OT SLP Therapies (In Progress)     Topic: Physical Therapy (In Progress)     Point: Mobility training (In Progress)     Learning Progress Summary           Patient Acceptance, E, NR by FW at 7/11/2022 1302                   Point: Home exercise program (Not Started)     Learner Progress:  Not documented in this visit.          Point: Body mechanics (In Progress)     Learning Progress Summary           Patient Acceptance, E, NR by FW at 7/11/2022 1302                   Point: Precautions (In Progress)     Learning Progress Summary           Patient Acceptance, E, NR by FW at 7/11/2022 1302                               User Key     Initials Effective Dates Name Provider Type Discipline     05/05/22 -  Alvaro Molina PT Physical Therapist PT              PT Recommendation and Plan  Planned Therapy Interventions (PT): balance training, bed mobility training, gait training, home exercise program, patient/family education, orthotic fitting/training, stretching, strengthening, ROM (range of motion), postural re-education, transfer training  Plan of Care Reviewed With: patient  Outcome Evaluation: Pt presents with cognitive deficits, impaired LLE range of motion, and decreased functional strength limiting her mobility. Pt performed bed mobility max Ax2 and stand pivot transferred from her bed to chair max A x2. IPPT warranted to help restore baseline mobility. Recommend dc SNF     Time Calculation:    PT Charges     Row Name 07/11/22 1304             Time Calculation    Start Time 1102  -FW      PT Received On 07/11/22  -FW      PT Goal Re-Cert Due Date 07/21/22  -FW              Timed Charges    45269 - PT Therapeutic Activity Minutes 8  -FW              Untimed Charges    PT Eval/Re-eval Minutes 46  -FW              Total Minutes    Timed Charges Total Minutes 8  -FW       Untimed Charges Total Minutes 46  -FW       Total Minutes 54  -FW            User Key  (r) = Recorded By, (t) = Taken By, (c) = Cosigned By    Initials Name Provider Type    FW Alvaro Molina, PT Physical Therapist              Therapy Charges for Today     Code Description Service Date Service Provider Modifiers Qty    34546210462 HC PT THERAPEUTIC ACT EA 15 MIN 7/11/2022 Alvaro Molina, PT GP 1    15756186193 HC PT EVAL LOW COMPLEXITY 4 7/11/2022 Alvaro Molina, PT GP 1          PT G-Codes  Outcome Measure Options: AM-PAC 6 Clicks Basic Mobility (PT)  AM-PAC 6 Clicks Score (PT): 6    Alvaro Molina PT  7/11/2022

## 2022-07-11 NOTE — PROGRESS NOTES
Chief Complaint: Status post hip trochanteric nailing with intramedullary hip screw   Date of surgery 7/10/2022    Subjective:    Patient is POD 1 hip trochanteric nailing with intramedullary hip screw. She was admitted with left displaced intertrochanteric hip fracture on 07/08/2022.    Patient is resting comfortably in chair. Patient's son has arrived and sitting at bedside. She now remains stable from a a medical perspective. She is currently on 5L O2 via nasal cannula. Block in place.    She was transfused 2 units platelets in preparation for surgery.      PHYSICAL THERAPY PROGRESS  Outcome Evaluation: Pt presents with cognitive deficits, impaired LLE range of motion, and decreased functional strength limiting her mobility. Pt performed bed mobility max Ax2 and stand pivot transferred from her bed to chair max A x2. IPPT warranted to help restore baseline mobility. Recommend dc SNF (07/11/22 1201)       Medications/Allergies:  Scheduled Meds:acetaminophen, 650 mg, Oral, Q8H  busPIRone, 10 mg, Oral, TID  cefepime, 2 g, Intravenous, Q8H  doxycycline, 100 mg, Oral, Q12H  famotidine, 10 mg, Oral, BID AC  ipratropium-albuterol, 3 mL, Nebulization, Q8H - RT  lactobacillus acidophilus, 1 capsule, Oral, Daily  levETIRAcetam, 500 mg, Oral, BID  metoprolol succinate XL, 25 mg, Oral, Daily  PARoxetine, 20 mg, Oral, Daily  senna-docusate sodium, 2 tablet, Oral, BID  sodium chloride, 10 mL, Intravenous, Q12H      Continuous Infusions:lactated ringers, 9 mL/hr  lactated ringers, 9 mL/hr, Last Rate: 9 mL/hr (07/10/22 2605)  Pharmacy Consult - Pharmacy to dose,   ropivacaine,       PRN Meds:.  acetaminophen    senna-docusate sodium **AND** polyethylene glycol **AND** bisacodyl **AND** bisacodyl    calcium carbonate    HYDROcodone-acetaminophen    ipratropium-albuterol    Morphine **AND** naloxone    Pharmacy Consult - Pharmacy to dose    potassium chloride **OR** potassium chloride **OR** potassium chloride    potassium  chloride **OR** potassium chloride **OR** potassium chloride    sodium chloride    sodium chloride  Adhesive tape, Ciprofloxacin, Zithromax [azithromycin], Sulfa antibiotics, Temazepam, and Penicillins      Objective:  Vital Signs   Temp:  [97.2 °F (36.2 °C)-98.5 °F (36.9 °C)] 98.3 °F (36.8 °C)  Heart Rate:  [102-119] 119  Resp:  [14-24] 18  BP: (107-163)/(73-99) 159/99    Results Review:   I reviewed the patient's new clinical results.  Results from last 7 days   Lab Units 07/11/22  0759   WBC 10*3/mm3 5.65   HEMOGLOBIN g/dL 9.4*   HEMATOCRIT % 28.9*   PLATELETS 10*3/mm3 61*     Results from last 7 days   Lab Units 07/11/22  0759   SODIUM mmol/L 146*   POTASSIUM mmol/L 3.3*   CHLORIDE mmol/L 97*   CO2 mmol/L 39.0*   BUN mg/dL 31*   CREATININE mg/dL 0.66   GLUCOSE mg/dL 147*   CALCIUM mg/dL 8.8     Results from last 7 days   Lab Units 07/11/22  0759   INR  1.29*     Results from last 7 days   Lab Units 07/11/22  0759   SODIUM mmol/L 146*   POTASSIUM mmol/L 3.3*   CHLORIDE mmol/L 97*   CO2 mmol/L 39.0*   BUN mg/dL 31*   CREATININE mg/dL 0.66   CALCIUM mg/dL 8.8   ALK PHOS U/L 125*   ALT (SGPT) U/L 816*   AST (SGOT) U/L 317*   GLUCOSE mg/dL 147*           Physical Exam:  General: On BiPAP when I evaluated her-evaluate her multiple times today.  Vascular: digits are pink and well perfused  Operative Hip Exam:   Neurologic: Able to move her ankle and toes. Sensation intact proximal and distal to incision sites. Pedal pulses palpable.  Dressing clean and dry over left hip.          Closed left hip fracture, initial encounter (HCC)    Essential hypertension    COPD with hypoxia (HCC)    CAP (community acquired pneumonia)    Acute on chronic respiratory failure with hypoxia and hypercapnia (HCC)    Elevated procalcitonin    Thrombocytopenia (HCC)    Hypokalemia    Elevated liver enzymes    Seizure (HCC)      Assessment/Plan:    Date of surgery: 7/11/2022  POD #: 1 status post hip intramedullary nail for hip  fracture    Anemia - acute blood loss anemia following hip fracture    Low platelets, hypokalemia-primary team and hematology team evaluating    Pain control - block per anesthesia    Dressing - clean and dry over incision site    Antibiotics -medical team    Patient currently on 5L oxygen nasal cannula    Disposition: Plan to go to skilled nursing facility once medically ready.      Activity -   Weight bear as tolerated  DVT Prophylaxis - defer to primary team given low platelets and elevated liver enzymes    PT/OT Consult - weight bear as tolerated with assistance and walker-wheelchair    Osteoporotic hip fracture - I recommend informing the patient's PCP regarding osteoporotic fracture/consideration of enrolling patient in osteoporotic care program postoperatively    Diet- advance as tolerated      Follow-up - to be determined     I discussed the patient's findings and my recommendations with patient with multiple evaluations today.    Future Appointments   Date Time Provider Department Center   7/29/2022  9:10 AM Renetta Cruz PA-C [facility]           Flip Ying MD, FAAOS  Orthopedic Surgeon  [facility]  Orthopedics and Sports Medicine  [address]  [place] [address]      Shonna Ingram PA-C  07/11/22  15:31 EDT

## 2022-07-12 NOTE — SIGNIFICANT NOTE
Pt w/ tachycardia overnight. EKG obtained and c/w sinus tach. RN attempted pain medication and I/O cath (w/ ~ 900ml urine removed) w/ some improvement in rate. HR continues to be 120's this morning w/ /81. Lopressor 2.5mg IV x 1 dose now. Am labs collected/pending. Will continue to monitor.

## 2022-07-12 NOTE — SIGNIFICANT NOTE
Exam confirms with auscultation zero audible heart tones and zero audible respirations. Ms.Jane BOLA Saba was pronounced dead at 1109, 07/12/2021, by Dr. Bettie Cook at bedside..  MD notified by Patient's RN.    Bola Arauz, RN  Clinical House Supervisor  7/12/2022 11:53 EDT

## 2022-07-12 NOTE — SIGNIFICANT NOTE
Called to patient room due to patient not breathing.  V-tach on monitor.  There was a weak pulse initially.  As patient was DNR/DNI, no further intervention was performed.  Son was at bedside with patient.    Confirmed absent breath sounds, pulse, heart tones, asystole on monitor and death pronounced at 1109.

## 2022-07-12 NOTE — DISCHARGE SUMMARY
[facility] Medicine Services  DEATH SUMMARY    Patient Name: Sierra Saba  : 1940  MRN: [id number]    Date of Admission: 2022  Date of Death:  2022  Time of Death:  1109    Primary Care Physician: Belinda King DO    Consults     Date and Time Order Name Status Description    2022 12:34 AM Inpatient Hematology & Oncology Consult Completed           Summary of Hospital Events     Presenting Problem:   Closed left hip fracture, initial encounter (HCC) [S72.002A]    Active Hospital Problems    Diagnosis  POA   • **Closed left hip fracture, initial encounter (HCC) [S72.002A]  Yes   • Thrombocytopenia (HCC) [D69.6]  Yes   • Hypokalemia [E87.6]  Yes   • Elevated liver enzymes [R74.8]  Yes   • Seizure (HCC) [R56.9]  Yes   • Acute on chronic respiratory failure with hypoxia and hypercapnia (HCC) [J96.21, J96.22]  Yes   • Elevated procalcitonin [R79.89]  Yes   • COPD with hypoxia (HCC) [J44.9, R09.02]  Yes   • CAP (community acquired pneumonia) [J18.9]  Yes   • Essential hypertension [I10]  Yes      Resolved Hospital Problems   No resolved problems to display.          Hospital Course:  Sierra Saba was a 81 y.o. female with COPD, HTN, seizure disorder who presented from her SNF after a fall and was found to have left intertrochanteric hip fracture as well as hypokalemia, thrombocytopenia and elevated LFTs.  She was being treated with levaquin for possible pneumonia.  She was transfused 2 units platelets in preparation for surgery and had a respiratory decompensation with concern for volume overload, possible sepsis.  Respiratory status improved with diuresis and she was taken to the OR 7/10 with successful repair.  Initially she appeared to have some improvement following surgery but on  had significant worsening of her LFTs and mental status along with worsening thrombocytopenia.  Workup for encephalopathy, thrombocytopenia, elevated LFTs was ongoing and she was being  treated for possible sepsis due to pneumonia.  Following EEG, patient stopped breathing and was noted to be in V-tach.  There was a weak pulse initially.  As patient was DNR/DNI, no further intervention was performed.  Son was at bedside with patient.  Death was pronounced at 1109.     Official Cause of Death and any directly related diagnoses:  Ventricular tachycardia, sepsis with acute liver injury, acute on chronic mixed respiratory failure, thrombocytopenia, hepatic cirrhosis, left intertrochanteric hip fracture.      Bettie Cook MD  07/12/22

## 2022-07-12 NOTE — PLAN OF CARE
Goal Outcome Evaluation:  Plan of Care Reviewed With: patient        Progress: no change   Metoprolol given due to elevated HR. In/out cath around 0400 for 900ml. Several Bms during the shift. New IV placed. Turned Q2. Pt has tolerated NC at 6L majority of the night. Pain controlled with IV meds. Oral care provided. Slept very little

## 2022-07-12 NOTE — PROGRESS NOTES
[facility] Medicine Services  PROGRESS NOTE    Patient Name: Sierra Saba  : 1940  MRN: [id number]    Date of Admission: 2022  Primary Care Physician: Belinda King DO    Subjective   Subjective     CC:  F/U hip fracture    HPI:  Mental status much worse this morning per nursing staff.      ROS:  GREGORY due to mental status      Objective   Objective     Vital Signs:   Temp:  [97.4 °F (36.3 °C)-98.8 °F (37.1 °C)] 98.8 °F (37.1 °C)  Heart Rate:  [106-142] 115  Resp:  [16-29] 29  BP: (131-180)/(59-99) 145/81  Flow (L/min):  [5-6] 6     Physical Exam:  Constitutional: Laying in bed, eyes open, mumbling  HENT: NCAT, mucous membranes moist  Respiratory: Clear to auscultation bilaterally, respiratory effort normal on nasal cannula  Cardiovascular: RRR, no murmurs, rubs, or gallops  Gastrointestinal: Soft, nontender, nondistended  Musculoskeletal: No BLE edema  Psychiatric: Confused, mumbling, occasionally answers questions  Neurologic: Limited due to ability to follow commands, left pupil reactive, right pupil dilated,  strength and movement appear symmetric in bilateral upper extremities  Skin: Multiple bruises, scabbed lesions on upper extremities    Results Reviewed:  LAB RESULTS:      Lab 22  0508 22  0759 07/10/22  1142 07/10/22  0757 22  1024 22  0635 22  0514 22  0141 22  1543 22  0734   WBC 8.34 5.65 4.06 3.65  --   --  12.31* 6.74  --  9.70   HEMOGLOBIN 9.0* 9.4* 10.2* 10.6*  --   --  11.6* 10.3*  --  13.3   HEMATOCRIT 28.6* 28.9* 31.6* 31.4*  --   --  36.3 32.3*  --  40.4   PLATELETS 44* 61* 89* 87*  --   --  207 141   < > 58*   NEUTROS ABS 2.75 3.33 2.15 1.72  --   --  7.51* 4.17  --  5.99   IMMATURE GRANS (ABS)  --   --   --  0.23*  --   --   --  0.51*  --  0.73*   LYMPHS ABS  --   --   --  1.48  --   --   --  1.63  --  2.41   MONOS ABS  --   --   --  0.20  --   --   --  0.39  --  0.49   EOS ABS 0.00 0.00 0.04 0.01  --    --  0.12 0.01  --  0.02   MCV 97.9* 96.3 95.5 92.4  --   --  95.5 95.6  --  95.1   PROCALCITONIN  --   --   --   --   --   --  5.29*  --   --   --    LACTATE  --   --   --   --  2.0 2.3* 2.4*  --   --   --    PROTIME  --  16.0* 16.4*  --   --   --   --   --   --  14.7*   APTT  --   --  27.1  --   --   --   --   --   --   --    D DIMER QUANT  --   --  3.85*  --   --   --   --   --   --   --     < > = values in this interval not displayed.         Lab 07/12/22  0508 07/11/22  0759 07/10/22  1718 07/10/22  1431 07/10/22  1142 07/10/22  0757 07/10/22  0011 07/09/22  0514 07/08/22  0734 07/06/22  0113   SODIUM 149* 146*  --   --   --  150* 148* 148*   < > 141   POTASSIUM 3.9 3.3* 3.1* 3.4* 2.5* 2.8* 3.2* 4.2   < > 2.4*   CHLORIDE 96* 97*  --   --   --  94* 93* 96*   < > 89*   CO2 20.0* 39.0*  --   --   --  44.0* 42.0* 43.0*   < > 42.0*   ANION GAP 33.0* 10.0  --   --   --  12.0 13.0 9.0   < > 10.0   BUN 36* 31*  --   --   --  35* 35* 25*   < > 23   CREATININE 0.90 0.66  --   --   --  0.82 0.73 0.69   < > 0.63   EGFR 64.8 88.8  --   --   --  72.4 83.3 87.9   < > 89.8   GLUCOSE 88 147*  --   --   --  175* 196* 139*   < > 215*   CALCIUM 9.7 8.8  --   --   --  9.6 9.1 9.4   < > 8.7   MAGNESIUM  --   --   --   --   --   --   --  2.2  --  2.0    < > = values in this interval not displayed.         Lab 07/12/22  0508 07/11/22  0759 07/10/22  0757 07/09/22  0514 07/08/22  0734 07/06/22  0113   TOTAL PROTEIN 5.0* 5.1* 5.6* 6.3 5.8* 5.4*   ALBUMIN 2.80* 3.00* 3.20* 3.80 3.70 3.40*   GLOBULIN 2.2 2.1 2.4  --  2.1 2.0   ALT (SGPT) 3,150* 816* 1,278* 187* 193* 213*   AST (SGOT) 2,760* 317* 622* 102* 96* 84*   BILIRUBIN 3.3* 2.0* 1.8* 1.7* 1.5* 1.0   INDIRECT BILIRUBIN  --   --   --  0.8  --   --    BILIRUBIN DIRECT  --   --   --  0.9*  --   --    ALK PHOS 198* 125* 125* 129* 125* 102         Lab 07/11/22  0759 07/10/22  1142 07/10/22  0757 07/08/22  0734   PROBNP  --   --  1,680.0  --    PROTIME 16.0* 16.4*  --  14.7*   INR 1.29*  1.32*  --  1.15*             Lab 07/08/22  0843   ABO TYPING A   RH TYPING Positive   ANTIBODY SCREEN Negative         Lab 07/10/22  0917 07/09/22  0454   PH, ARTERIAL 7.629* 7.456*   PCO2, ARTERIAL 49.9* 69.9*   PO2 ART 55.9* 64.3*   FIO2 35 50   HCO3 ART 52.3* 49.3*   BASE EXCESS ART 27.8* 21.5*   CARBOXYHEMOGLOBIN 1.2 1.2     Brief Urine Lab Results  (Last result in the past 365 days)      Color   Clarity   Blood   Leuk Est   Nitrite   Protein   CREAT   Urine HCG        07/08/22 1208 Yellow   Clear   Negative   Negative   Negative   30 mg/dL (1+)                 Microbiology Results Abnormal     Procedure Component Value - Date/Time    Blood Culture - Blood, Hand, Right [[id number]]  (Normal) Collected: 07/10/22 0011    Lab Status: Preliminary result Specimen: Blood from Hand, Right Updated: 07/12/22 0705     Blood Culture No growth at 2 days    Narrative:      Aerobic bottle only      Blood Culture - Blood, Arm, Right [[id number]]  (Normal) Collected: 07/10/22 0011    Lab Status: Preliminary result Specimen: Blood from Arm, Right Updated: 07/12/22 0705     Blood Culture No growth at 2 days    Narrative:      Aerobic bottle only      COVID PRE-OP / PRE-PROCEDURE SCREENING ORDER (NO ISOLATION) - Swab, Nasopharynx [[id number]]  (Normal) Collected: 07/08/22 0836    Lab Status: Final result Specimen: Swab from Nasopharynx Updated: 07/08/22 0943    Narrative:      The following orders were created for panel order COVID PRE-OP / PRE-PROCEDURE SCREENING ORDER (NO ISOLATION) - Swab, Nasopharynx.  Procedure                               Abnormality         Status                     ---------                               -----------         ------                     COVID-19, ABBOTT IN-HOUS...[[id number]]  Normal              Final result                 Please view results for these tests on the individual orders.    COVID-19, ABBOTT IN-HOUSE,NASAL Swab (NO TRANSPORT MEDIA) 2 HR TAT - Swab, Nasopharynx [[id number]]   (Normal) Collected: 07/08/22 0836    Lab Status: Final result Specimen: Swab from Nasopharynx Updated: 07/08/22 0943     COVID19 Presumptive Negative    Narrative:      Fact sheet for providers: https://www.fda.gov/media/092620/download     Fact sheet for patients: https://www.fda.gov/media/192779/download    Test performed by PCR.  If inconsistent with clinical signs and symptoms patient should be tested with different authorized molecular test.          FL C Arm During Surgery    Result Date: 7/10/2022  DATE OF EXAM: 7/10/2022 3:11 PM  PROCEDURE: FL C ARM DURING SURGERY-  INDICATIONS: Troch nail; S72.002A-Fracture of unspecified part of neck of left femur, initial encounter for closed fracture; E87.6-Hypokalemia; Z87.09-Personal history of other diseases of the respiratory system  COMPARISON: No comparisons available.  TECHNIQUE: 5 fluoroscopic spot images obtained over 1 minute 45 seconds of fluoroscopy time  FINDINGS: Fluoroscopic images demonstrate ORIF of the left hip.      Impression: Fluoroscopic imaging obtained without a radiologist present. Please see performing physician notes for full detail  This report was finalized on 7/10/2022 9:22 PM by Jake Tellez.      XR Hip With or Without Pelvis 2 - 3 View Left    Result Date: 7/10/2022  DATE OF EXAM: 7/10/2022 7:20 PM  PROCEDURE: XR HIP W OR WO PELVIS 2-3 VIEW LEFT-  INDICATIONS: left hip intertrochanteric fracture?please obtain in PACU AP pelvis and crosstable lateral of the left hip  COMPARISON: 7/8/2022  TECHNIQUE: Two views of the left hip with Pelvis were obtained.  FINDINGS: Patient is status post ORIF of the proximal left femur. Intramedullary nail and dynamic blade through the femoral neck are noted. Fracture fragments demonstrate gross anatomic alignment. Interlocking screw is noted. No acute osseous abnormality is otherwise appreciated. Imaging of the pelvis is grossly unremarkable sacrum is obscured by bowel gas and limited by severe  osteopenia. Right total hip arthroplasty in place      Impression: Status post ORIF left hip fracture    This report was finalized on 7/10/2022 7:45 PM by Jake Tellez.        Results for orders placed during the hospital encounter of 05/12/22    Adult Transthoracic Echo Complete W/ Cont if Necessary Per Protocol    Interpretation Summary  · There is a large left pleural effusion.  · Estimated right ventricular systolic pressure from tricuspid regurgitation is moderately elevated (45-55 mmHg). Calculated right ventricular systolic pressure from tricuspid regurgitation is 45 mmHg.  · Left ventricular ejection fraction appears to be 61 - 65%.  · There is moderate plaque in the descending aorta present.  · Left ventricular diastolic dysfunction is noted.      I have reviewed the medications:  Scheduled Meds:busPIRone, 10 mg, Oral, TID  cefepime, 2 g, Intravenous, Q12H  doxycycline, 100 mg, Oral, Q12H  famotidine, 10 mg, Oral, BID AC  ipratropium-albuterol, 3 mL, Nebulization, Q8H - RT  lactobacillus acidophilus, 1 capsule, Oral, Daily  levETIRAcetam, 500 mg, Intravenous, Q12H  metoprolol succinate XL, 25 mg, Oral, Daily  PARoxetine, 20 mg, Oral, Daily  senna-docusate sodium, 2 tablet, Oral, BID  sodium chloride, 10 mL, Intravenous, Q12H      Continuous Infusions:lactated ringers, 9 mL/hr  lactated ringers, 9 mL/hr, Last Rate: 9 mL/hr (07/10/22 1823)  Pharmacy Consult - Pharmacy to dose,   ropivacaine,       PRN Meds:.•  acetaminophen  •  senna-docusate sodium **AND** polyethylene glycol **AND** bisacodyl **AND** bisacodyl  •  calcium carbonate  •  ipratropium-albuterol  •  Morphine **AND** naloxone  •  oxyCODONE  •  Pharmacy Consult - Pharmacy to dose  •  potassium chloride **OR** potassium chloride **OR** potassium chloride  •  potassium chloride **OR** potassium chloride **OR** potassium chloride  •  sodium chloride  •  sodium chloride    Assessment & Plan   Assessment & Plan     Active Hospital Problems     Diagnosis  POA   • **Closed left hip fracture, initial encounter ([facility]) [S72.002A]  Yes   • Thrombocytopenia (HCC) [D69.6]  Yes   • Hypokalemia [E87.6]  Yes   • Elevated liver enzymes [R74.8]  Yes   • Seizure (HCC) [R56.9]  Yes   • Acute on chronic respiratory failure with hypoxia and hypercapnia (HCC) [J96.21, J96.22]  Yes   • Elevated procalcitonin [R79.89]  Yes   • COPD with hypoxia (HCC) [J44.9, R09.02]  Yes   • CAP (community acquired pneumonia) [J18.9]  Yes   • Essential hypertension [I10]  Yes      Resolved Hospital Problems   No resolved problems to display.        Brief Hospital Course to date:  Sierra Saba is a 81 y.o. female with COPD, HTN, seizure disorder who presented from her SNF after a fall and was found to have left intertrochanteric hip fracture as well as hypokalemia, thrombocytopenia and elevated LFTs.  She was being treated with levaquin for possible pneumonia.  She was transfused 2 units platelets in preparation for surgery and had a respiratory decompensation with concern for volume overload, possible sepsis.    This patient's problems and plans were partially entered by my partner and updated as appropriate by me 07/12/22.    Encephalopathy  - Mental status much worse today.  - Stat CT head, ABG, EEG if no findings on CT head given her history of seizures    Left intertrochanteric femur fracture   - Imaging with intertrochanteric left femur fracture   - Dr. Ying following  - Continue PRN pain control  - S/P repair 7/10    Acute on chronic mixed respiratory failure  Suspected volume overload after platelet transfusion/acute on chronic diastolic CHF  Possible sepsis due to pneumonia  - Blood cultures NGTD, repeat procalcitonin today  - Continue cefepime, doxycycline (penicillin allergy)  - S/P diuresis with IV lasix.  Limited now due to hypernatremia and metabolic alkalosis   - Recent echo with normal LVEF, diastolic dysfunction   - Respiratory status has improved     Hypokalemia   -  Continue replacement aggressively     Thrombocytopenia   - Possibly reactive related to infection/consumption from trauma, underlying hepatic cirrhosis may be contributing  - Resolved s/p 2 units platelets but now decreasing     Elevated LFTs  Hepatic cirrhosis on ultrasound  - Likely ischemic or congestive hepatopathy related to acute CHF, possibly had a hypotensive episode at some point.  - Acute hepatitis panel is negative, HIV negative  - LFTs improved yesterday but much worse today  - With cytopenias have sent MARICHUY, AMA, anti smooth muscle antibody, Ig profile, ANCA, HSV PCR  - GI evaluation     HTN   - Continue home metoprolol, resume amlodipine as tolerated      COPD  - On 2-3L home O2.   - Continue home inhalers     Seizure disorder  - Change keppra to IV until she is stable enough to take PO reliably    Expected Discharge Location and Transportation: [facility]  Expected Discharge Date: 7/15    DVT prophylaxis:  Mechanical DVT prophylaxis orders are present.     AM-PAC 6 Clicks Score (PT): 6 (07/11/22 1302)    CODE STATUS: Given her worsening clinical status, I discussed with her son via telephone.  He is aware that we can shift to comfort focused care at any time and we will keep in touch as results come back throughout the day.  Code Status and Medical Interventions:   Ordered at: 07/08/22 1040     Medical Intervention Limits:    NO intubation (DNI)     Level Of Support Discussed With:    Patient     Code Status (Patient has no pulse and is not breathing):    No CPR (Do Not Attempt to Resuscitate)     Medical Interventions (Patient has pulse or is breathing):    Limited Support       Bettie Cook MD  07/12/22

## 2022-07-13 LAB
ANA SER QL: NEGATIVE
MITOCHONDRIA M2 IGG SER-ACNC: <20 UNITS (ref 0–20)
QT INTERVAL: 308 MS
QT INTERVAL: 334 MS
QTC INTERVAL: 463 MS
QTC INTERVAL: 494 MS
SMA IGG SER-ACNC: 7 UNITS (ref 0–19)

## 2022-07-14 LAB
C-ANCA TITR SER IF: NORMAL TITER
MYELOPEROXIDASE AB SER IA-ACNC: <0.2 UNITS (ref 0–0.9)
P-ANCA ATYPICAL TITR SER IF: NORMAL TITER
P-ANCA TITR SER IF: NORMAL TITER
PROTEINASE3 AB SER IA-ACNC: <0.2 UNITS (ref 0–0.9)

## 2022-07-15 LAB
BACTERIA SPEC AEROBE CULT: NORMAL
BACTERIA SPEC AEROBE CULT: NORMAL

## 2022-07-17 LAB
HSV1 DNA SPEC QL NAA+PROBE: NEGATIVE
HSV2 DNA SPEC QL NAA+PROBE: NEGATIVE

## 2023-06-27 NOTE — CASE MANAGEMENT/SOCIAL WORK
Continued Stay Note  [facility]     Patient Name: Sierra Saba  MRN: [id number]  Today's Date: 3/1/2022    Admit Date: 2/21/2022     Discharge Plan     Row Name 03/01/22 1408       Plan    Plan [facility]    Patient/Family in Agreement with Plan yes    Plan Comments Per Katya at [facility], they have accepted the pt to the [facility] location. Will need a covid test today. [facility] will transport with 02 at 1530  on 3-2-2022. The pt is in agreement.    Final Discharge Disposition Code 03 - skilled nursing facility (SNF)               Discharge Codes    No documentation.               Expected Discharge Date and Time     Expected Discharge Date Expected Discharge Time    Mar 2, 2022             Pascale Ferguson RN     Patent

## 2025-03-09 NOTE — THERAPY EVALUATION
Patient Name: Sierra Saba  : 1940    MRN: [id number]                              Today's Date: 2022       Admit Date: 2022    Visit Dx:     ICD-10-CM ICD-9-CM   1. Closed left hip fracture, initial encounter ([facility])  S72.002A 820.8   2. Hypokalemia  E87.6 276.8   3. History of COPD  Z87.09 V12.69     Patient Active Problem List   Diagnosis   • Cough   • Fall   • Acute kidney injury (HCC)   • Essential hypertension   • COPD with hypoxia (HCC)   • Lung blebs (HCC)   • Left renal mass   • CAP (community acquired pneumonia)   • GERD without esophagitis   • KIM (generalized anxiety disorder)   • Hyperglycemia   • Pneumonia of both lower lobes   • Severe malnutrition (CMS/HCC)   • Acute on chronic respiratory failure with hypoxia and hypercapnia (HCC)   • Elevated procalcitonin   • Closed left hip fracture, initial encounter ([facility])   • Thrombocytopenia (HCC)   • Hypokalemia   • Elevated liver enzymes   • Seizure (HCC)     Past Medical History:   Diagnosis Date   • Anxiety    • COPD (chronic obstructive pulmonary disease) (HCC)    • Hypertension    • Seizures (HCC)      Past Surgical History:   Procedure Laterality Date   • BACK SURGERY     • CHEST TUBE INSERTION Left 10/20/2021   • CHOLECYSTECTOMY     • HYSTERECTOMY     • TOTAL HIP ARTHROPLASTY        General Information     Row Name 22 1316          OT Time and Intention    Document Type evaluation  -AR     Mode of Treatment occupational therapy;co-treatment  -AR     Row Name 22 1316          General Information    Patient Profile Reviewed yes  -AR     Prior Level of Function --  pt poor historian and unable to report, no family at bedside  -AR     Existing Precautions/Restrictions fall;oxygen therapy device and L/min;brace worn when out of bed   L fascia iliaca nerve catheter, KI to LLE d/t residual quad weakness, desat on 6L NC with exertion  -AR     Barriers to Rehab medically complex;cognitive status;previous functional deficit  -AR      Row Name 07/11/22 1316          Living Environment    People in Home facility resident  -AR     Row Name 07/11/22 1316          Cognition    Orientation Status (Cognition) oriented to;person;disoriented to;place;situation;time;verbal cues/prompts needed for orientation  -AR     Row Name 07/11/22 1316          Safety Issues, Functional Mobility    Safety Issues Affecting Function (Mobility) awareness of need for assistance;insight into deficits/self-awareness;judgment;problem-solving;safety precaution awareness;safety precautions follow-through/compliance;sequencing abilities  -AR     Impairments Affecting Function (Mobility) balance;cognition;endurance/activity tolerance;motor control;pain;range of motion (ROM);shortness of breath;strength  -AR     Cognitive Impairments, Mobility Safety/Performance attention;awareness, need for assistance;insight into deficits/self-awareness;judgment;problem-solving/reasoning;safety precaution awareness;safety precaution follow-through;sequencing abilities  -AR           User Key  (r) = Recorded By, (t) = Taken By, (c) = Cosigned By    Initials Name Provider Type    AR Idalia Kellogg OT Occupational Therapist                 Mobility/ADL's     Row Name 07/11/22 1318          Bed Mobility    Bed Mobility supine-sit;scooting/bridging  -AR     Scooting/Bridging Will (Bed Mobility) maximum assist (25% patient effort);verbal cues  -AR     Supine-Sit Will (Bed Mobility) maximum assist (25% patient effort);verbal cues;2 person assist  -AR     Assistive Device (Bed Mobility) bed rails;draw sheet;head of bed elevated  -AR     Row Name 07/11/22 1318          Transfers    Transfers sit-stand transfer;stand-sit transfer;bed-chair transfer  -AR     Comment, (Transfers) Cues for hand placement, ongoing cues to sequence and for chair approach. KI donned prior to OOB activity d/t L quad weakness, doffed end of session and RN notified of need for brace. Pt on 6L NC, desat to  76% following transfer. Nursing at bedside and monitoring.  -AR     Bed-Chair [place] (Transfers) maximum assist (25% patient effort);2 person assist  -AR     Assistive Device (Bed-Chair Transfers) other (see comments)  BUE HHA  -AR     Sit-Stand [place] (Transfers) maximum assist (25% patient effort);2 person assist;verbal cues  -AR     Stand-Sit [place] (Transfers) maximum assist (25% patient effort);2 person assist;verbal cues  -AR     Row Name 07/11/22 1318          Sit-Stand Transfer    Assistive Device (Sit-Stand Transfers) other (see comments)  -AR     Row Name 07/11/22 1318          Stand-Sit Transfer    Assistive Device (Stand-Sit Transfers) other (see comments)  -AR     Row Name 07/11/22 1318          Activities of Daily Living    BADL Assessment/Intervention upper body dressing;lower body dressing  -AR     Row Name 07/11/22 1318          Mobility    Extremity Weight-bearing Status left lower extremity  -AR     Left Lower Extremity (Weight-bearing Status) weight-bearing as tolerated (WBAT)  -AR     Row Name 07/11/22 1318          Upper Body Dressing Assessment/Training    [place] Level (Upper Body Dressing) doff;don;front opening garment;maximum assist (25% patient effort)  -AR     Position (Upper Body Dressing) supported sitting  -AR     Row Name 07/11/22 1318          Lower Body Dressing Assessment/Training    [place] Level (Lower Body Dressing) don;socks;dependent (less than 25% patient effort)  -AR     Position (Lower Body Dressing) supine  -AR     Comment, (Lower Body Dressing) Deferred AE training d/t cognitive status  -AR           User Key  (r) = Recorded By, (t) = Taken By, (c) = Cosigned By    Initials Name Provider Type    Idalia Biggs OT Occupational Therapist               Obj/Interventions     Row Name 07/11/22 1321          Range of Motion Comprehensive    General Range of Motion bilateral upper extremity ROM WFL  -AR     Row Name 07/11/22 1321           Strength Comprehensive (MMT)    Comment, General Manual Muscle Testing (MMT) Assessment Unable to formally assess d/t cognitive status  -AR     Row Name 07/11/22 1321          Balance    Balance Assessment sitting static balance;sitting dynamic balance;standing static balance;standing dynamic balance  -AR     Static Sitting Balance moderate assist  -AR     Dynamic Sitting Balance moderate assist  -AR     Position, Sitting Balance sitting edge of bed  -AR     Static Standing Balance maximum assist;2-person assist;verbal cues  -AR     Dynamic Standing Balance maximum assist;2-person assist;verbal cues  -AR     Position/Device Used, Standing Balance supported  -AR           User Key  (r) = Recorded By, (t) = Taken By, (c) = Cosigned By    Initials Name Provider Type    Idalia Biggs OT Occupational Therapist               Goals/Plan     Row Name 07/11/22 1328          Transfer Goal 1 (OT)    Activity/Assistive Device (Transfer Goal 1, OT) sit-to-stand/stand-to-sit;commode, bedside without drop arms  -AR     [place] Level/Cues Needed (Transfer Goal 1, OT) moderate assist (50-74% patient effort);verbal cues required  -AR     Time Frame (Transfer Goal 1, OT) long term goal (LTG);by discharge  -AR     Progress/Outcome (Transfer Goal 1, OT) goal ongoing  -AR     Row Name 07/11/22 1328          Dressing Goal 1 (OT)    Activity/Device (Dressing Goal 1, OT) upper body dressing  -AR     [place]/Cues Needed (Dressing Goal 1, OT) moderate assist (50-74% patient effort);verbal cues required  -AR     Time Frame (Dressing Goal 1, OT) long term goal (LTG);by discharge  -AR     Progress/Outcome (Dressing Goal 1, OT) goal ongoing  -AR     Row Name 07/11/22 1328          Grooming Goal 1 (OT)    Activity/Device (Grooming Goal 1, OT) wash face, hands  -AR     [place] (Grooming Goal 1, OT) verbal cues required;minimum assist (75% or more patient effort)  -AR     Time Frame (Grooming Goal 1, OT) long term goal  (LTG);by discharge  -AR     Progress/Outcome (Grooming Goal 1, OT) goal ongoing  -AR     Row Name 07/11/22 1328          Self-Feeding Goal 1 (OT)    Activity/Device (Self-Feeding Goal 1, OT) self-feeding skills, all  -AR     Globe Level/Cues Needed (Self-Feeding Goal 1, OT) supervision required  AE PRN  -AR     Time Frame (Self-Feeding Goal 1, OT) long term goal (LTG);by discharge  -AR     Progress/Outcomes (Self-Feeding Goal 1, OT) goal ongoing  -AR     Row Name 07/11/22 1328          Therapy Assessment/Plan (OT)    Planned Therapy Interventions (OT) activity tolerance training;BADL retraining;functional balance retraining;IADL retraining;occupation/activity based interventions;patient/caregiver education/training;ROM/therapeutic exercise;transfer/mobility retraining;strengthening exercise  -AR           User Key  (r) = Recorded By, (t) = Taken By, (c) = Cosigned By    Initials Name Provider Type    AR Idalia Kellogg, OT Occupational Therapist               Clinical Impression     Row Name 07/11/22 1322          Pain Assessment    Pain Intervention(s) Medication (See MAR);Cold applied;Repositioned;Ambulation/increased activity  -AR     Additional Documentation Pain Scale: FACES Pre/Post-Treatment (Group)  -AR     Row Name 07/11/22 1322          Pain Scale: FACES Pre/Post-Treatment    Pain: FACES Scale, Pretreatment 2-->hurts little bit  -AR     Posttreatment Pain Rating 2-->hurts little bit  -AR     Pain Location - Side/Orientation Left  -AR     Pain Location generalized  -AR     Pain Location - hip  -AR     Row Name 07/11/22 1322          Plan of Care Review    Plan of Care Reviewed With patient  -AR     Outcome Evaluation Pt alert, reports minimal L hip pain at rest and follows commands with cues to participate. She completed bed mobility with max assist x2, transfer to chair with max assist x2 and required KI to LLE d/t residual quad weakness. She completed UB dressing task with max assist and LB  dressing with dependence. Pt not a candidate for AE training d/t cognitive status. Pt on 6L NC for mobility, desat to 76% and - nursing aware. Recommend SNF.  -AR     Row Name 07/11/22 1322          Therapy Assessment/Plan (OT)    Rehab Potential (OT) good, to achieve stated therapy goals  -AR     Criteria for Skilled Therapeutic Interventions Met (OT) yes  -AR     Therapy Frequency (OT) daily  -AR     Row Name 07/11/22 1322          Therapy Plan Review/Discharge Plan (OT)    Anticipated Discharge Disposition (OT) skilled nursing facility  -AR     Row Name 07/11/22 1322          Vital Signs    Pre SpO2 (%) 94  6L NC  -AR     O2 Delivery Pre Treatment supplemental O2  -AR     Intra SpO2 (%) 76   -AR     O2 Delivery Intra Treatment supplemental O2  -AR     Post SpO2 (%) 94  5L NC  -AR     O2 Delivery Post Treatment supplemental O2  -AR     Pre Patient Position Supine  -AR     Intra Patient Position Standing  -AR     Post Patient Position Sitting  -AR     Row Name 07/11/22 1322          Positioning and Restraints    Pre-Treatment Position in bed  -AR     Post Treatment Position chair  -AR     In Chair notified nsg;reclined;call light within reach;encouraged to call for assist;exit alarm on;compression device;waffle cushion;legs elevated;on mechanical lift sling  -AR           User Key  (r) = Recorded By, (t) = Taken By, (c) = Cosigned By    Initials Name Provider Type    Idalia Biggs, OT Occupational Therapist               Outcome Measures     Row Name 07/11/22 1330          How much help from another is currently needed...    Putting on and taking off regular lower body clothing? 1  -AR     Bathing (including washing, rinsing, and drying) 1  -AR     Toileting (which includes using toilet bed pan or urinal) 1  -AR     Putting on and taking off regular upper body clothing 2  -AR     Taking care of personal grooming (such as brushing teeth) 2  -AR     Eating meals 2  -AR     AM-PAC 6 Clicks Score (OT) 9   -AR     Row Name 07/11/22 1302          How much help from another person do you currently need...    Turning from your back to your side while in flat bed without using bedrails? 1  -FW     Moving from lying on back to sitting on the side of a flat bed without bedrails? 1  -FW     Moving to and from a bed to a chair (including a wheelchair)? 1  -FW     Standing up from a chair using your arms (e.g., wheelchair, bedside chair)? 1  -FW     Climbing 3-5 steps with a railing? 1  -FW     To walk in hospital room? 1  -FW     AM-PAC 6 Clicks Score (PT) 6  -FW     Highest level of mobility 2 --> Bed activities/dependent transfer  -FW     Row Name 07/11/22 1330 07/11/22 1302       Functional Assessment    Outcome Measure Options AM-PAC 6 Clicks Daily Activity (OT)  -AR AM-PAC 6 Clicks Basic Mobility (PT)  -FW          User Key  (r) = Recorded By, (t) = Taken By, (c) = Cosigned By    Initials Name Provider Type    Idalia Biggs, OT Occupational Therapist    FW Alvaro Molina, PT Physical Therapist                Occupational Therapy Education                 Title: PT OT SLP Therapies (In Progress)     Topic: Occupational Therapy (In Progress)     Point: ADL training (In Progress)     Description:   Instruct learner(s) on proper safety adaptation and remediation techniques during self care or transfers.   Instruct in proper use of assistive devices.              Learning Progress Summary           Patient Acceptance, E,TB,D, NR by AR at 7/11/2022 1330                   Point: Home exercise program (In Progress)     Description:   Instruct learner(s) on appropriate technique for monitoring, assisting and/or progressing therapeutic exercises/activities.              Learning Progress Summary           Patient Acceptance, E,TB,D, NR by AR at 7/11/2022 1330                   Point: Precautions (In Progress)     Description:   Instruct learner(s) on prescribed precautions during self-care and functional transfers.               Learning Progress Summary           Patient Acceptance, E,TB,D, NR by AR at 7/11/2022 1330                   Point: Body mechanics (In Progress)     Description:   Instruct learner(s) on proper positioning and spine alignment during self-care, functional mobility activities and/or exercises.              Learning Progress Summary           Patient Acceptance, E,TB,D, NR by AR at 7/11/2022 1330                               User Key     Initials Effective Dates Name Provider Type Discipline    AR 06/16/21 -  Idalia Klelogg, OT Occupational Therapist OT              OT Recommendation and Plan  Planned Therapy Interventions (OT): activity tolerance training, BADL retraining, functional balance retraining, IADL retraining, occupation/activity based interventions, patient/caregiver education/training, ROM/therapeutic exercise, transfer/mobility retraining, strengthening exercise  Therapy Frequency (OT): daily  Plan of Care Review  Plan of Care Reviewed With: patient  Outcome Evaluation: Pt alert, reports minimal L hip pain at rest and follows commands with cues to participate. She completed bed mobility with max assist x2, transfer to chair with max assist x2 and required KI to LLE d/t residual quad weakness. She completed UB dressing task with max assist and LB dressing with dependence. Pt not a candidate for AE training d/t cognitive status. Pt on 6L NC for mobility, desat to 76% and - nursing aware. Recommend SNF.     Time Calculation:    Time Calculation- OT     Row Name 07/11/22 1331             Time Calculation- OT    OT Start Time 1111  -AR      OT Received On 07/11/22  -AR      OT Goal Re-Cert Due Date 07/21/22  -AR              Timed Charges    41660 - OT Self Care/Mgmt Minutes 10  -AR              Untimed Charges    OT Eval/Re-eval Minutes 60  -AR              Total Minutes    Timed Charges Total Minutes 10  -AR      Untimed Charges Total Minutes 60  -AR       Total Minutes 70  -AR             User Key  (r) = Recorded By, (t) = Taken By, (c) = Cosigned By    Initials Name Provider Type    AR Idalia Kellogg, OT Occupational Therapist              Therapy Charges for Today     Code Description Service Date Service Provider Modifiers Qty    [id number]  OT SELF CARE/MGMT/TRAIN EA 15 MIN 7/11/2022 Idalia Kellogg, OT GO 1    [id number]  OT EVAL LOW COMPLEXITY 4 7/11/2022 Idalia Kellogg OT GO 1               Idalia Kellogg OT  7/11/2022   No

## (undated) DEVICE — GLV SURG BIOGEL LTX PF 7 1/2

## (undated) DEVICE — KT PUMP INFUBLOCK MDL 2100 PMKITSOLIS

## (undated) DEVICE — 450 ML BOTTLE OF 0.05% CHLORHEXIDINE GLUCONATE IN 99.95% STERILE WATER FOR IRRIGATION, USP AND APPLICATOR.: Brand: IRRISEPT ANTIMICROBIAL WOUND LAVAGE

## (undated) DEVICE — ADHS SKIN PREMIERPRO EXOFIN TOPICAL HI/VISC .5ML

## (undated) DEVICE — SNAP KOVER: Brand: UNBRANDED

## (undated) DEVICE — ANTIBACTERIAL UNDYED BRAIDED (POLYGLACTIN 910), SYNTHETIC ABSORBABLE SUTURE: Brand: COATED VICRYL

## (undated) DEVICE — PK MAJ FX HIP 10

## (undated) DEVICE — BIT DRL 3FLUT QC CALIB 4.2X330X100MM

## (undated) DEVICE — CVR HNDL LIGHT RIGID

## (undated) DEVICE — 1010 S-DRAPE TOWEL DRAPE 10/BX: Brand: STERI-DRAPE™

## (undated) DEVICE — SUT MONOCRYL PLS ANTIB UND 3/0  PS1 27IN

## (undated) DEVICE — GW FOR TROCH NAIL 3.2X400MM

## (undated) DEVICE — 3M™ DURAPORE™ SURGICAL TAPE 1538-3, 3 INCH X 10 YARD (7,5CM X 9,1M), 4 ROLLS/BOX: Brand: 3M™ DURAPORE™

## (undated) DEVICE — Device

## (undated) DEVICE — GLV SURG SENSICARE PI ORTHO SZ7.5 LF STRL

## (undated) DEVICE — 3M™ STERI-STRIP™ REINFORCED ADHESIVE SKIN CLOSURES, R1547, 1/2 IN X 4 IN (12 MM X 100 MM), 6 STRIPS/ENVELOPE: Brand: 3M™ STERI-STRIP™

## (undated) DEVICE — HDRST POSTIN FM CRDL TRACH SLOT 9X8X4IN

## (undated) DEVICE — SPK10295 ORTHOPEDIC FRACTURE KIT: Brand: SPK10295 ORTHOPEDIC FRACTURE KIT

## (undated) DEVICE — APPL CHLORAPREP TINTED 26ML TEAL